# Patient Record
Sex: MALE | Race: WHITE | NOT HISPANIC OR LATINO | Employment: UNEMPLOYED | URBAN - METROPOLITAN AREA
[De-identification: names, ages, dates, MRNs, and addresses within clinical notes are randomized per-mention and may not be internally consistent; named-entity substitution may affect disease eponyms.]

---
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
## 2024-01-04 DIAGNOSIS — G71.01 DUCHENNE MUSCULAR DYSTROPHY (H): ICD-10-CM

## 2024-01-04 RX ORDER — PREDNISONE 20 MG/1
TABLET ORAL
Qty: 40 TABLET | Refills: 5 | Status: SHIPPED | OUTPATIENT
Start: 2024-01-04 | End: 2024-07-22

## 2024-01-04 NOTE — TELEPHONE ENCOUNTER
1. Refill request received from: [facility] in [place]   2. Medication Requested: Prednisone 20mg TAB   3. Directions:take 5 tablets PO BID   4. Quantity:40  5. Last Office Visit: 9/6/23                    Has it been over a year since the last appointment (6 months for diabetes)? no                    If No:     Move on to next question.                    If Yes:                      Change refill quantity to 1 month.                      Route to Provider or Pool & let them know its been over a year since patient has been seen.                      If they do not have an upcoming appointment- reach out to family to schedule or route to .  6. Next Appointment Scheduled for: 2/23/24  7. Last refill: 11/22/23  8. Sent To: [facility]- Refills provided per protocol.

## 2024-01-17 DIAGNOSIS — G71.01 CARDIOMYOPATHY IN DUCHENNE MUSCULAR DYSTROPHY (H): ICD-10-CM

## 2024-01-17 DIAGNOSIS — I43 CARDIOMYOPATHY IN DUCHENNE MUSCULAR DYSTROPHY (H): ICD-10-CM

## 2024-01-17 RX ORDER — ENALAPRIL MALEATE 10 MG/1
10 TABLET ORAL 2 TIMES DAILY
Qty: 60 TABLET | Refills: 11 | Status: SHIPPED | OUTPATIENT
Start: 2024-01-17

## 2024-01-17 NOTE — TELEPHONE ENCOUNTER
Refill request received from: [id number]   Medication Requested:  Enalapril 10 mg tablet  Directions:Take 1 tablet (10 mg) by mouth 2 times daily   Quantity:60  Last Office Visit: 9/8/23  Next Appointment Scheduled for: 3/8/24  Last refill: 10/06/23  Sent To:  RN or Provider- Refills provided per protocol.

## 2024-01-19 DIAGNOSIS — G71.01 CARDIOMYOPATHY IN DUCHENNE MUSCULAR DYSTROPHY (H): ICD-10-CM

## 2024-01-19 DIAGNOSIS — I43 CARDIOMYOPATHY IN DUCHENNE MUSCULAR DYSTROPHY (H): ICD-10-CM

## 2024-01-19 RX ORDER — CARVEDILOL 25 MG/1
25 TABLET ORAL 2 TIMES DAILY WITH MEALS
Qty: 60 TABLET | Refills: 11 | Status: SHIPPED | OUTPATIENT
Start: 2024-01-19

## 2024-01-19 NOTE — TELEPHONE ENCOUNTER
Refill request received from: [id number]  Medication Requested:  Carvedilol 25 mg tablet  Directions:Take 1 tablet by mouth twice a day with meals   Quantity:60  Last Office Visit: 9/8/24  Next Appointment Scheduled for: 3/8/24  Last refill: 1/10/24  Sent To:  RN or Provider    Refill provided per protocol.

## 2024-01-25 ENCOUNTER — TELEPHONE (OUTPATIENT)
Dept: PEDIATRIC NEUROLOGY | Facility: CLINIC | Age: 19
End: 2024-01-25
Payer: COMMERCIAL

## 2024-01-25 NOTE — TELEPHONE ENCOUNTER
Refills sent to pharmacy 1/4/24. Called [facility] to confirm prescription received. Called Christopher and his mother to provide them with the updated Rx number: [id number].    Gave adithyados to Christopher for participating in the phone call and taking an active role in refilling his medications.

## 2024-01-25 NOTE — TELEPHONE ENCOUNTER
[facility] Call Center    Phone Message    May a detailed message be left on voicemail: yes     Reason for Call: Medication Refill Request    Has the patient contacted the pharmacy for the refill? Yes   Name of medication being requested: predniSONE (DELTASONE) 20 MG tablet   Provider who prescribed the medication:   Pharmacy: [facility] [id number] - [address]   Date medication is needed: 01/29/24

## 2024-02-07 ENCOUNTER — TELEPHONE (OUTPATIENT)
Dept: PEDIATRIC CARDIOLOGY | Facility: CLINIC | Age: 19
End: 2024-02-07
Payer: COMMERCIAL

## 2024-02-07 DIAGNOSIS — G71.01 CARDIOMYOPATHY IN DUCHENNE MUSCULAR DYSTROPHY (H): ICD-10-CM

## 2024-02-07 DIAGNOSIS — I43 CARDIOMYOPATHY IN DUCHENNE MUSCULAR DYSTROPHY (H): ICD-10-CM

## 2024-02-07 RX ORDER — EPLERENONE 25 MG/1
25 TABLET, FILM COATED ORAL DAILY
Qty: 90 TABLET | Refills: 3 | Status: SHIPPED | OUTPATIENT
Start: 2024-02-07

## 2024-02-07 NOTE — TELEPHONE ENCOUNTER
[facility] Call Center    Phone Message    May a detailed message be left on voicemail: yes     Reason for Call: Medication Refill Request    Has the patient contacted the pharmacy for the refill? Yes  Name of medication being requested: eplerenone (INSPRA) 25 MG tablet   Provider who prescribed the medication: Peter Bright MD  Pharmacy: *change of pharmacy* [facility] at [address] [place] [address]. Store [id number]. Phone: [phone]  Date medication is needed: asap please (out of medication)    Action Taken: Message routed to:  Other: ump peds cardiology [facility]    Travel Screening: Not Applicable

## 2024-02-07 NOTE — TELEPHONE ENCOUNTER
Last visit 9/8/23. Next appointment 2/23/24. Refills to requested pharmacy provided per protocol.

## 2024-02-09 ENCOUNTER — TELEPHONE (OUTPATIENT)
Dept: PEDIATRIC CARDIOLOGY | Facility: CLINIC | Age: 19
End: 2024-02-09
Payer: COMMERCIAL

## 2024-02-09 NOTE — TELEPHONE ENCOUNTER
Prior Authorization Retail Medication Request    Medication/Dose: Eplerenone 25 mg tablets  Diagnosis and ICD code (if different than what is on RX):  G71.01  New/renewal/insurance change PA/secondary ins. PA:  Previously Tried and Failed:    Rationale:  renewal    Insurance   Primary: /[employer] [id number]   Insurance ID:  [id number]    Secondary (if applicable):NA  Insurance ID:  NA    Pharmacy Information (if different than what is on RX)  [facility] [place]  Phone:  [phone]  Fax:[phone]     [place]: [id number]

## 2024-02-15 ENCOUNTER — CARE COORDINATION (OUTPATIENT)
Dept: PEDIATRIC NEUROLOGY | Facility: CLINIC | Age: 19
End: 2024-02-15
Payer: COMMERCIAL

## 2024-02-15 DIAGNOSIS — G71.01 CARDIOMYOPATHY IN DUCHENNE MUSCULAR DYSTROPHY (H): Primary | ICD-10-CM

## 2024-02-15 DIAGNOSIS — I43 CARDIOMYOPATHY IN DUCHENNE MUSCULAR DYSTROPHY (H): Primary | ICD-10-CM

## 2024-02-15 NOTE — PROGRESS NOTES
Orders entered for MD clinic prep. Christopher will have labs, echo and EKG done 2/23/24 at [facility]. Christopher has virtual visit with Dr. Bright scheduled 3/8/24.

## 2024-02-23 ENCOUNTER — ONCOLOGY VISIT (OUTPATIENT)
Dept: PEDIATRIC HEMATOLOGY/ONCOLOGY | Facility: CLINIC | Age: 19
End: 2024-02-23
Attending: PEDIATRICS
Payer: COMMERCIAL

## 2024-02-23 ENCOUNTER — OFFICE VISIT (OUTPATIENT)
Dept: PEDIATRIC NEUROLOGY | Facility: CLINIC | Age: 19
End: 2024-02-23
Attending: PEDIATRICS
Payer: COMMERCIAL

## 2024-02-23 ENCOUNTER — ANCILLARY PROCEDURE (OUTPATIENT)
Dept: BONE DENSITY | Facility: CLINIC | Age: 19
End: 2024-02-23
Attending: PEDIATRICS
Payer: COMMERCIAL

## 2024-02-23 ENCOUNTER — HOSPITAL ENCOUNTER (OUTPATIENT)
Dept: CARDIOLOGY | Facility: CLINIC | Age: 19
Discharge: HOME | End: 2024-02-23
Attending: PEDIATRICS
Payer: COMMERCIAL

## 2024-02-23 ENCOUNTER — THERAPY VISIT (OUTPATIENT)
Dept: PHYSICAL THERAPY | Facility: CLINIC | Age: 19
End: 2024-02-23
Attending: PEDIATRICS
Payer: COMMERCIAL

## 2024-02-23 ENCOUNTER — OFFICE VISIT (OUTPATIENT)
Dept: PULMONOLOGY | Facility: CLINIC | Age: 19
End: 2024-02-23
Attending: PEDIATRICS
Payer: COMMERCIAL

## 2024-02-23 VITALS
RESPIRATION RATE: 18 BRPM | BODY MASS INDEX: 30 KG/M2 | DIASTOLIC BLOOD PRESSURE: 58 MMHG | WEIGHT: 191.14 LBS | SYSTOLIC BLOOD PRESSURE: 102 MMHG | HEART RATE: 75 BPM | OXYGEN SATURATION: 98 % | TEMPERATURE: 97.3 F | HEIGHT: 67 IN

## 2024-02-23 VITALS
RESPIRATION RATE: 18 BRPM | OXYGEN SATURATION: 98 % | WEIGHT: 191.14 LBS | HEART RATE: 75 BPM | TEMPERATURE: 97.3 F | DIASTOLIC BLOOD PRESSURE: 58 MMHG | HEIGHT: 67 IN | SYSTOLIC BLOOD PRESSURE: 102 MMHG | BODY MASS INDEX: 30 KG/M2

## 2024-02-23 VITALS
HEART RATE: 75 BPM | TEMPERATURE: 97.3 F | SYSTOLIC BLOOD PRESSURE: 102 MMHG | WEIGHT: 191.14 LBS | RESPIRATION RATE: 18 BRPM | HEIGHT: 67 IN | OXYGEN SATURATION: 98 % | DIASTOLIC BLOOD PRESSURE: 58 MMHG | BODY MASS INDEX: 30 KG/M2

## 2024-02-23 DIAGNOSIS — G71.01 CARDIOMYOPATHY IN DUCHENNE MUSCULAR DYSTROPHY (H): ICD-10-CM

## 2024-02-23 DIAGNOSIS — G71.01 DUCHENNE MUSCULAR DYSTROPHY (H): Primary | ICD-10-CM

## 2024-02-23 DIAGNOSIS — I43 CARDIOMYOPATHY IN DUCHENNE MUSCULAR DYSTROPHY (H): ICD-10-CM

## 2024-02-23 DIAGNOSIS — G47.33 OSA (OBSTRUCTIVE SLEEP APNEA): ICD-10-CM

## 2024-02-23 DIAGNOSIS — M85.89 OSTEOPENIA OF MULTIPLE SITES: ICD-10-CM

## 2024-02-23 DIAGNOSIS — Z71.83 ENCOUNTER FOR NONPROCREATIVE GENETIC COUNSELING: ICD-10-CM

## 2024-02-23 DIAGNOSIS — J98.4 RESTRICTIVE LUNG DISEASE DUE TO MUSCULAR DYSTROPHY (H): Primary | ICD-10-CM

## 2024-02-23 DIAGNOSIS — E30.0 DELAYED PUBERTY: ICD-10-CM

## 2024-02-23 DIAGNOSIS — G71.00 RESTRICTIVE LUNG DISEASE DUE TO MUSCULAR DYSTROPHY (H): Primary | ICD-10-CM

## 2024-02-23 DIAGNOSIS — G71.01 DUCHENNE MUSCULAR DYSTROPHY (H): Primary | Chronic | ICD-10-CM

## 2024-02-23 DIAGNOSIS — E55.9 VITAMIN D DEFICIENCY: ICD-10-CM

## 2024-02-23 DIAGNOSIS — Z79.52 CURRENT CHRONIC USE OF SYSTEMIC STEROIDS: ICD-10-CM

## 2024-02-23 DIAGNOSIS — E04.9 GOITER: ICD-10-CM

## 2024-02-23 DIAGNOSIS — M81.8 OTHER OSTEOPOROSIS WITHOUT CURRENT PATHOLOGICAL FRACTURE: ICD-10-CM

## 2024-02-23 LAB
ALBUMIN SERPL BCG-MCNC: 4.1 G/DL (ref 3.5–5.2)
ALP SERPL-CCNC: 81 U/L (ref 65–260)
ALT SERPL W P-5'-P-CCNC: 55 U/L (ref 0–50)
ANION GAP SERPL CALCULATED.3IONS-SCNC: 10 MMOL/L (ref 7–15)
AST SERPL W P-5'-P-CCNC: 69 U/L (ref 0–35)
BASOPHILS # BLD AUTO: 0.1 10E3/UL (ref 0–0.2)
BASOPHILS NFR BLD AUTO: 1 %
BILIRUB SERPL-MCNC: 0.8 MG/DL
BUN SERPL-MCNC: 10.1 MG/DL (ref 6–20)
CALCIUM SERPL-MCNC: 8.9 MG/DL (ref 8.6–10)
CHLORIDE SERPL-SCNC: 100 MMOL/L (ref 98–107)
CREAT SERPL-MCNC: 0.35 MG/DL (ref 0.67–1.17)
CYSTATIN C (ROCHE): 1 MG/L (ref 0.6–1)
DEPRECATED HCO3 PLAS-SCNC: 28 MMOL/L (ref 22–29)
EGFRCR SERPLBLD CKD-EPI 2021: >90 ML/MIN/1.73M2
EOSINOPHIL # BLD AUTO: 0.2 10E3/UL (ref 0–0.7)
EOSINOPHIL NFR BLD AUTO: 3 %
ERYTHROCYTE [DISTWIDTH] IN BLOOD BY AUTOMATED COUNT: 14.3 % (ref 10–15)
EXPTIME-PRE: 7.49 SEC
FEF2575-%PRED-PRE: 7 %
FEF2575-PRE: 0.34 L/SEC
FEF2575-PRED: 4.78 L/SEC
FEFMAX-%PRED-PRE: 14 %
FEFMAX-PRE: 1.42 L/SEC
FEFMAX-PRED: 9.81 L/SEC
FEV1-%PRED-PRE: 17 %
FEV1-PRE: 0.77 L
FEV1FEV6-PRE: 47 %
FEV1FEV6-PRED: 85 %
FEV1FVC-PRE: 47 %
FEV1FVC-PRED: 87 %
FIFMAX-PRE: 1.53 L/SEC
FSH SERPL IRP2-ACNC: 2.5 MIU/ML (ref 1.5–12.4)
FVC-%PRED-PRE: 32 %
FVC-PRE: 1.64 L
FVC-PRED: 5 L
GFR SERPL CREATININE-BSD FRML MDRD: >90 ML/MIN/1.73M2
GLUCOSE SERPL-MCNC: 78 MG/DL (ref 70–99)
HCT VFR BLD AUTO: 44.2 % (ref 40–53)
HGB BLD-MCNC: 14.2 G/DL (ref 13.3–17.7)
IMM GRANULOCYTES # BLD: 0.1 10E3/UL
IMM GRANULOCYTES NFR BLD: 1 %
LH SERPL-ACNC: 3.1 MIU/ML (ref 1.7–8.6)
LYMPHOCYTES # BLD AUTO: 2.9 10E3/UL (ref 0.8–5.3)
LYMPHOCYTES NFR BLD AUTO: 30 %
MCH RBC QN AUTO: 27.6 PG (ref 26.5–33)
MCHC RBC AUTO-ENTMCNC: 32.1 G/DL (ref 31.5–36.5)
MCV RBC AUTO: 86 FL (ref 78–100)
MEP-PRE: 38 CMH2O
MIP-PRE: -35 CMH2O
MONOCYTES # BLD AUTO: 0.7 10E3/UL (ref 0–1.3)
MONOCYTES NFR BLD AUTO: 7 %
NEUTROPHILS # BLD AUTO: 5.7 10E3/UL (ref 1.6–8.3)
NEUTROPHILS NFR BLD AUTO: 58 %
NRBC # BLD AUTO: 0 10E3/UL
NRBC BLD AUTO-RTO: 0 /100
NT-PROBNP SERPL-MCNC: 58 PG/ML (ref 0–450)
PHOSPHATE SERPL-MCNC: 4 MG/DL (ref 2.5–4.5)
PLATELET # BLD AUTO: 315 10E3/UL (ref 150–450)
POTASSIUM SERPL-SCNC: 4.1 MMOL/L (ref 3.4–5.3)
PROT SERPL-MCNC: 6.5 G/DL (ref 6.4–8.3)
PTH-INTACT SERPL-MCNC: 33 PG/ML (ref 15–65)
RBC # BLD AUTO: 5.14 10E6/UL (ref 4.4–5.9)
SODIUM SERPL-SCNC: 138 MMOL/L (ref 135–145)
T4 FREE SERPL-MCNC: 1.8 NG/DL (ref 1–1.6)
TSH SERPL DL<=0.005 MIU/L-ACNC: 1.28 UIU/ML (ref 0.5–4.3)
WBC # BLD AUTO: 9.5 10E3/UL (ref 4–11)

## 2024-02-23 PROCEDURE — 99215 OFFICE O/P EST HI 40 MIN: CPT | Performed by: PEDIATRICS

## 2024-02-23 PROCEDURE — 94375 RESPIRATORY FLOW VOLUME LOOP: CPT

## 2024-02-23 PROCEDURE — G0463 HOSPITAL OUTPT CLINIC VISIT: HCPCS | Mod: 25,27 | Performed by: PEDIATRICS

## 2024-02-23 PROCEDURE — 93306 TTE W/DOPPLER COMPLETE: CPT | Mod: 26 | Performed by: PEDIATRICS

## 2024-02-23 PROCEDURE — 36415 COLL VENOUS BLD VENIPUNCTURE: CPT | Performed by: GENETIC COUNSELOR, MS

## 2024-02-23 PROCEDURE — 83001 ASSAY OF GONADOTROPIN (FSH): CPT | Performed by: GENETIC COUNSELOR, MS

## 2024-02-23 PROCEDURE — 94799 UNLISTED PULMONARY SVC/PX: CPT | Mod: 26 | Performed by: PEDIATRICS

## 2024-02-23 PROCEDURE — 97161 PT EVAL LOW COMPLEX 20 MIN: CPT | Mod: GP | Performed by: PHYSICAL THERAPIST

## 2024-02-23 PROCEDURE — 77080 DXA BONE DENSITY AXIAL: CPT | Mod: 26 | Performed by: PEDIATRICS

## 2024-02-23 PROCEDURE — 93306 TTE W/DOPPLER COMPLETE: CPT

## 2024-02-23 PROCEDURE — G0463 HOSPITAL OUTPT CLINIC VISIT: HCPCS | Mod: 25,27 | Performed by: PSYCHIATRY & NEUROLOGY

## 2024-02-23 PROCEDURE — 82610 CYSTATIN C: CPT | Performed by: GENETIC COUNSELOR, MS

## 2024-02-23 PROCEDURE — 83880 ASSAY OF NATRIURETIC PEPTIDE: CPT | Performed by: GENETIC COUNSELOR, MS

## 2024-02-23 PROCEDURE — G0463 HOSPITAL OUTPT CLINIC VISIT: HCPCS | Mod: 25 | Performed by: PEDIATRICS

## 2024-02-23 PROCEDURE — 82306 VITAMIN D 25 HYDROXY: CPT | Performed by: GENETIC COUNSELOR, MS

## 2024-02-23 PROCEDURE — 99215 OFFICE O/P EST HI 40 MIN: CPT | Mod: GC | Performed by: PEDIATRICS

## 2024-02-23 PROCEDURE — 77080 DXA BONE DENSITY AXIAL: CPT

## 2024-02-23 PROCEDURE — 84403 ASSAY OF TOTAL TESTOSTERONE: CPT | Performed by: GENETIC COUNSELOR, MS

## 2024-02-23 PROCEDURE — 99213 OFFICE O/P EST LOW 20 MIN: CPT | Performed by: PSYCHIATRY & NEUROLOGY

## 2024-02-23 PROCEDURE — 93005 ELECTROCARDIOGRAM TRACING: CPT | Mod: RTG

## 2024-02-23 PROCEDURE — 80053 COMPREHEN METABOLIC PANEL: CPT | Performed by: GENETIC COUNSELOR, MS

## 2024-02-23 PROCEDURE — 94375 RESPIRATORY FLOW VOLUME LOOP: CPT | Mod: 26 | Performed by: PEDIATRICS

## 2024-02-23 PROCEDURE — 83970 ASSAY OF PARATHORMONE: CPT | Performed by: GENETIC COUNSELOR, MS

## 2024-02-23 PROCEDURE — 84100 ASSAY OF PHOSPHORUS: CPT | Performed by: GENETIC COUNSELOR, MS

## 2024-02-23 PROCEDURE — 84484 ASSAY OF TROPONIN QUANT: CPT | Performed by: GENETIC COUNSELOR, MS

## 2024-02-23 PROCEDURE — 83002 ASSAY OF GONADOTROPIN (LH): CPT | Performed by: GENETIC COUNSELOR, MS

## 2024-02-23 PROCEDURE — G2211 COMPLEX E/M VISIT ADD ON: HCPCS | Performed by: PSYCHIATRY & NEUROLOGY

## 2024-02-23 PROCEDURE — 84443 ASSAY THYROID STIM HORMONE: CPT | Performed by: GENETIC COUNSELOR, MS

## 2024-02-23 PROCEDURE — 84439 ASSAY OF FREE THYROXINE: CPT | Performed by: GENETIC COUNSELOR, MS

## 2024-02-23 PROCEDURE — 85004 AUTOMATED DIFF WBC COUNT: CPT | Performed by: GENETIC COUNSELOR, MS

## 2024-02-23 PROCEDURE — 96040 HC GENETIC COUNSELING, EACH 30 MINUTES: CPT | Performed by: GENETIC COUNSELOR, MS

## 2024-02-23 PROCEDURE — G2211 COMPLEX E/M VISIT ADD ON: HCPCS | Performed by: PEDIATRICS

## 2024-02-23 PROCEDURE — 94799 UNLISTED PULMONARY SVC/PX: CPT

## 2024-02-23 ASSESSMENT — PAIN SCALES - GENERAL
PAINLEVEL: NO PAIN (0)

## 2024-02-23 NOTE — PATIENT INSTRUCTIONS
We will decrease the AVAPS rate to 8 breaths per minute. Please let us know if you don't like the change and we can go back.   Continue to do breath stacking techniques 3 breaths three times per day.  Follow up in 6 months with full lung function testing.

## 2024-02-23 NOTE — NURSING NOTE
[id number]  Chief Complaint   Patient presents with    Follow Up     MD     Initial /58 (BP Location: Right arm, Patient Position: Chair, Cuff Size: Adult Large)   Pulse 75   Temp 97.3  F (36.3  C) (Oral)   Resp 18   Ht 5' 6.93\" (170 cm)   Wt 191 lb 2.2 oz (86.7 kg)   SpO2 98%   BMI 30.00 kg/m   Estimated body mass index is 30 kg/m  as calculated from the following:    Height as of this encounter: 5' 6.93\" (170 cm).    Weight as of this encounter: 191 lb 2.2 oz (86.7 kg).  Medication Reconciliation: complete    Does the patient need any medication refills today? No    Does the patient/parent need MyChart or Proxy acces today? Yes    Does the patient want a flu shot today? No        Stephan Roy MA           "

## 2024-02-23 NOTE — PATIENT INSTRUCTIONS
Pediatric Neuromuscular Specialty Clinic  [facility]    Contact Numbers:    For questions that are not urgent, contact:  Rachel Romero RN Care Coordinator:  [phone]  Norma Byrd RN Care Coordinator: [phone]     After hours, or for urgent questions,   contact: [phone]    Schedule or change an appointment:  Patrica Ma, [phone]    Genetic Counselor: Desiree Minaya, [phone]    Physical Therapy: Adeline Smith, [phone]     Dietician: Ana Flores, [phone]    Prescription renewals:  Your pharmacy must fax request to [phone]  **Please allow 2-3 days for prescriptions to be authorized.    Today's Visit:   Follow-up in 6-months  We will send orders for a new sling

## 2024-02-23 NOTE — LETTER
2/23/2024       RE: Christopher Gorman  [address]  [place] [address]     Dear Colleague,    Thank you for referring your patient, Christopher Gorman, to the [facility] PEDIATRIC SPECIALTY CLINIC at [facility]. Please see a copy of my visit note below.                  Pediatric Neuromuscular Clinic      Christopher Gorman MRN# [id number]   YOB: 2005 Age: 17 year old      Date of Visit:  Sep 6, 2023     Primary care provider: Jose Finn     Refering physician:[unfilled]      History is obtained from the patient, family and medical record       Interval Change:      Christopher Gorman is a 18year 4month old male was seen and examined at the pediatric neuromuscular clinic on Sep 6, 2023 for a follow up evaluation of previously diagnosed Duchenne muscular dystrophy.   He is nonambulatory and uses a power wheelchair full-time for mobility.  He had a new power wheelchair last year in May 2023 that has a standing feature, and they use this about 2-3 times per day to stretch his legs.  They find this future quite helpful.  He is dependent for transfers and uses a lift.  He uses a chair for showering.  Currently is taking prednisone, vitamin D, Vitamin C, calcium, and heart medications.  Denies any side effects.  Denies any new shortness of breath.  Does have baseline neuromuscular weakness with breathing overnight and uses BiPAP consistently.  Denies any GI concerns, otherwise sleeping well.  He graduated from high school last year and has been at home with his parents.              Immunizations:           Immunization History   Administered Date(s) Administered     Influenza Vaccine >6 months (Alfuria,Fluzone) 12/12/2014     Pneumococcal 23 valent 12/12/2014              Allergies:    No Known Allergies          Medications:      Prescription Medications as of 9/6/2023           Rx Number Disp Refills Start End Last Dispensed Date Next  Fill Date Owning Pharmacy     acetaminophen (TYLENOL) 325 MG tablet   1 Bottle 0 2019       [facility] [address]     Sig: Take 2 tablets (650 mg) by mouth every 6 hours as needed for mild pain or fever     Class: No Print Out     Route: Oral     Ascorbic Acid (VITAMIN C PO)                     Sig: Take 3 tablets by mouth daily     Class: Historical     Route: Oral     CALCIUM ANTACID 500 MG chewable tablet     0 7/3/2019       [facility]     Si chew tab 2 times daily     Class: Historical     calcium carbonate 500 mg, elemental, (OSCAL) 500 MG tablet   120 tablet 5 2023       [facility]     Sig: Take 2 tablets (1,000 mg) by mouth 2 times daily     Class: E-Prescribe     Route: Oral     carvedilol (COREG) 25 MG tablet   60 tablet 11 2023       [facility]     Sig: Take 1 tablet (25 mg) by mouth 2 times daily (with meals)     Class: E-Prescribe     Route: Oral     Cholecalciferol (VITAMIN D3) 50 MCG (2000 UT) CAPS   60 capsule 5 2020       [facility]     Sig: Take 2 capsules by mouth daily     Class: E-Prescribe     Route: Oral     enalapril (VASOTEC) 10 MG tablet   60 tablet 11 2023       [facility]     Sig: Take 1 tablet (10 mg) by mouth 2 times daily     Class: E-Prescribe     Route: Oral     eplerenone (INSPRA) 25 MG tablet   90 tablet 3 2023             Sig: Take 1 tablet (25 mg) by mouth daily     Class: Local Print     Route: Oral     FLUoxetine (PROZAC) 10 MG capsule               [facility]     Sig: Take 10 mg by mouth daily     Class: Historical     Route: Oral     omeprazole (PRILOSEC) 10 MG DR capsule   16 capsule 4 2023       [facility]     Sig:  "OPEN 1 CAPSULE AND SPRINKLE CONTENTS ON SPOONFUL OF FOOD ONCE DAILY ON FRIDAY, SATURDAY,SUNDAY AND MONDAY     Class: E-Prescribe     omeprazole (PRILOSEC) 10 MG DR capsule   16 capsule 3 12/7/2020       [facility], [address]     Sig: OPEN 1 CAPSULE AND SPRINKLE CONTENTS ON A SPOONFUL OF FOOD, ONCE DAILY, FRIDAY, SATURDAY, SUNDAY AND MONDAY.     Class: E-Prescribe     order for DME   1 Units 0 8/11/2017       [facility] [address]     Sig: Sling for Micah Lift.     Class: Local Print     predniSONE (DELTASONE) 20 MG tablet   40 tablet 5 7/12/2023       [facility] [address]     Sig: TAKE 5 TABLETS BY MOUTH TWICE WEEKLY     Class: E-Prescribe     Notes to Pharmacy: We are filling last refill today and the patient is requesting authorization to refill once that supply has been used. Thank you!     testosterone cypionate (DEPOTESTOSTERONE) 200 MG/ML injection   1 mL 5 3/18/2021       [facility] [address]     Sig: Inject 1 mL (200 mg) into the muscle every 28 days     Class: E-Prescribe     Notes to Pharmacy: Please provide 18 g needle for withdrawing medication and 23 gauge needle for administration. Please provide 3 mL syringe.     Route: Intramuscular                     Review of Systems:   The Review of Systems is negative other than noted in the HPI          Physical Exam:      /58 (BP Location: Right arm, Patient Position: Sitting, Cuff Size: Adult Large)   Pulse 75   Temp 97.3  F (36.3  C) (Oral)   Resp 18   Ht 1.7 m (5' 6.93\")   Wt 86.7 kg (191 lb 2.2 oz)   SpO2 98%   BMI 30.00 kg/m      Physical Exam:   General: NAD  Eyes: No conjunctival injection, no scleral icterus.  Mouth: No oral lesions, no erythema or exudate in the oropharynx  Respiratory: No increased work of breathing  Cardiovascular: No lower extremity edema  Extremities: Warm, dry  Neurologic:             Mental " Status Exam: Alert, awake and easily engaged in interaction.             Cranial Nerves: PERRLA, EOMs intact, no nystagmus, facial movements symmetric,                 facial sensation intact to light touch, hearing intact to conversation, palate and uvula               rise symmetrically, no deviation in uvula or tongue, tongue midline and fully mobile                with no atrophy or fasciculations.              Motor:    Manual muscle testing: Mostly only antigravity with his proximal upper and lower extremities, and distal extremities are 2-3.  He is able to hold his neck up unsupported most of the time.             Sensory: Normal response to touch.              Coordination: No overt dysmetria seen.              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 Stable cardiomyopathy with normal cardiac function. Asymptomatic restrictive lung disease.     Given that patient has been stable over the past 1 year, we will continue with the current treatment plan of prednisone 100 mg twice weekly with vitamin D and calcium.     Recommendations:   - Continue prednisone 100 mg twice weekly  - Continue vitamin D and calcium  - Continue to follow up with cardiology  - Continue to follow up with ortho  - Return to clinic in 6 months or sooner if necessary      Charles Chacon MD  Neuromuscular Medicine Fellow, PGY-5  [facility]       The longitudinal plan of care for the diagnosis as documented were addressed during this visit. Due to the added complexity in care, I will continue to support Christopher in the subsequent management and with ongoing continuity of care.  I personally examined this patient with the fellow Dr Chacon.  I have spent at  least 20 min on the date of the encounter in chart review, patient visit, review of tests, counseling the patient, documentation about the issues documented above. Our recommendations were discussed with the other providers and patient and/or family.         Tray Cavazos MD  Neurology and neuromuscular medicine                Again, thank you for allowing me to participate in the care of your patient.      Sincerely,    Tray Cavazos MD

## 2024-02-23 NOTE — LETTER
2/23/2024       RE: Christopher Gorman  [address]  [place] [address]     Dear Colleague,    Thank you for referring your patient, Christopher Gorman, to the [facility] at [facility]. Please see a copy of my visit note below.    Pediatric Endocrinology Follow-up Consultation    Patient: Christopher Gorman MRN# [id number]   YOB: 2005 Age: 18year 4month old   Date of Visit: Feb 23, 2024    Dear Dr. Jose Lucasuble:    I had the pleasure of seeing your patient, Christopher Gorman in the Pediatric Endocrinology Clinic, [facility], on Feb 23, 2024 for a follow-up consultation of vitamin D deficiency and osteoporosis secondary to chronic glucocortoid therapy.         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 He was previously seen by my colleague, Dr. Delatorre, and was last seen in 2018.     To review, he was diagnosed with DMD in April 2014. Steroid treatment (prednisone) was started in  2014. Christopher was found to have a vertebral compression fracture and received zoledronate therapy in July 2019. He had severe hypocalcemia following the infusion requiring hospitalization.      Christopher was found to have a vertebral compression fracture and received zoledronate therapy in July 2019. He had severe hypocalcemia following the infusion requiring hospitalization.    INTERIM HISTORY: Since last visit on 11/17/2022, Christopher's health has been stable with no significant new concerns.     No recent falls. No recent back, muscle or joint pain.    Christopher continues to take 5 20 mg tablets of prednisone twice weekly.    Christopher takes 500 mg Oscal twice daily and 1000 mg Tums twice daily. He is also taking vitamin D 5000 daily.     He had his most recent zoledronate infusion on 9/9/2021.  Since the last zoledronate infusion Christopher has not had any new falls or back pain.     History was obtained from Christopher and his parents, his brother was also present.         Social History:   He graduated from high school.    Social history was reviewed and is unchanged. Refer to the initial note.         Family History:     Family History   Problem Relation Age of Onset    Thyroid Disease Mother     Diabetes Maternal Grandmother     Thyroid Disease Maternal Grandmother     Muscular Dystrophy Brother      Dad has recent kidney stones.    Family history was reviewed and is unchanged. Refer to the initial note.         Allergies:   No Known Allergies          Medications:     Current Outpatient Medications   Medication Sig Dispense Refill    acetaminophen (TYLENOL) 325 MG tablet Take 2 tablets (650 mg) by mouth every 6 hours as needed for mild pain or fever 1 Bottle 0    Ascorbic Acid (VITAMIN C PO) Take 3 tablets by mouth daily      CALCIUM ANTACID 500 MG chewable tablet 1 chew tab 2 times daily  0    calcium carbonate 500 mg, elemental, (OSCAL) 500 MG tablet Take 2 tablets (1,000 mg) by mouth 2 times daily 120 tablet 5    carvedilol (COREG) 25 MG  "tablet Take 1 tablet (25 mg) by mouth 2 times daily (with meals) 60 tablet 11    Cholecalciferol (VITAMIN D3) 50 MCG (2000 UT) CAPS Take 2 capsules by mouth daily 60 capsule 5    enalapril (VASOTEC) 10 MG tablet Take 1 tablet (10 mg) by mouth 2 times daily 60 tablet 11    eplerenone (INSPRA) 25 MG tablet Take 1 tablet (25 mg) by mouth daily 90 tablet 3    FLUoxetine (PROZAC) 10 MG capsule Take 10 mg by mouth daily      order for DME Sling for Micah Lift. 1 Units 0    predniSONE (DELTASONE) 20 MG tablet TAKE 5 TABLETS BY MOUTH TWICE WEEKLY 40 tablet 5             Review of Systems:   Gen: Negative  Eye: Negative, no vision concerns. Due for another eye appointment with good vision.   ENT: Negative, no hearing concerns. He had braces removed in July 2021. He has had issues with wisdom teeth. They are investigating options for surgery due to challenges related to mobility and anesthesia.  Pulmonary: He uses a BIPAP machine some of the time at night (6 hours).  Cardio: Negative, no dizziness or fainting.   Gastrointestinal: Some constipation and is on Colace.  Hematologic: Negative, no bruising or bleeding concerns.  Genitourinary: Negative, no bladder concerns.  Musculoskeletal: He uses a electronic chair at all times except for when he uses a lift for the bathroom and transfers.   Psychiatric: Negative  Neurologic: Negative, no headaches. No seizures.   Skin: Dry skin due to eczema. A little acne on nose and chin.  Endocrine: see HPI. He does not shave his face yet.             Physical Exam:   Blood pressure 102/58, pulse 75, temperature 97.3  F (36.3  C), temperature source Temporal, resp. rate 18, height 1.7 m (5' 6.93\"), weight 86.7 kg (191 lb 2.2 oz), SpO2 98%.  Blood pressure %natalie are not available for patients who are 18 years or older.  Height: 170 cm   17 %ile (Z= -0.94) based on CDC (Boys, 2-20 Years) Stature-for-age data based on Stature recorded on 2/23/2024.  Weight: 86.7 kg (actual weight), 89 %ile (Z= " 1.22) based on [facility] (Boys, 2-20 Years) weight-for-age data using vitals from 2/23/2024. 165 lbs at the time of surgery for appendicitis.  BMI: Body mass index is 30 kg/m . 95 %ile (Z= 1.64) based on [facility] (Boys, 2-20 Years) BMI-for-age based on BMI available as of 2/23/2024.     GENERAL:  He is alert and in no apparent distress. Mildly Cushingoid face.   HEENT:  Head is  normocephalic and atraumatic.  Pupils equal, round and reactive to light and accommodation.  Extraocular movements are intact.  Funduscopic exam shows crisp disc margins and normal venous pulsations.  Nares are clear.  Oropharynx shows normal dentition uvula and palate.  Tympanic membranes visualized and clear.   NECK:  Supple.  Thyroid was palpable, smooth with no nodules. It was not enlarged.    LUNGS:  Clear to auscultation bilaterally.   CARDIOVASCULAR:  Regular rate and rhythm without murmur, gallop or rub.   BREASTS:  Teo I.  Axillary hair, odor and sweat were absent.   ABDOMEN:  Nondistended.  Positive bowel sounds, soft and nontender.  No hepatosplenomegaly or masses palpable.   GENITOURINARY EXAM: Deferred, Exam was Teo V at previous visit.   MUSCULOSKELETAL: He is wheelchair bound. No evidence of scoliosis. No pain to palpation and percussion of the spine lumbar spine.   NEUROLOGIC:  Cranial nerves II-XII tested and intact.   SKIN:  No evidence of acne or oiliness. No striae.  [place] colored macules on the back.         Laboratory results:   DX HIP/PELVIS/SPINE. 6/27/2019 1:20 PM     INDICATION: Hereditary progressive muscular dystrophy (H)     COMPARISON: 3/23/18     TECHNICAL: The patient was scanned using a GE Lunar Prodigy, with  pediatric software.     Age: 13 years 8 months  Gender: Male  Race/Ethnicity: White  Referring Physician: LOTUS DURAN     FINDINGS:     Image quality: adequate  Height: 61.0 inches   Weight: 152.9 lbs.  Height percentile for age: 5  Height age included if height less than 3rd percentile    "  Densitometry results:  Spine L2-L4, L1 excluded due to >1 SD compared to other lumbar  vertebrae  Chronological age BMD Z-score: -2.3  Bone Mineral Density: 0.735 gm/cm2  Percent change: 1.2%     Total Body Less Head:  Chronological age BMD Z-score: -3.0  Bone Mineral Density: 0.677 gm/cm2  Percent change: 2.6%     Body Composition:  Lean body mass for height centile: 1%  % body fat: 59.7%                                                                      IMPRESSION:   1. Bone mineral density below the expected range for age with no  significant change since DXA on 3/23/18.  2. L1 excluded due to >1 SD compared to other lumbar vertebrae, may  represent vertebral compression fracture based upon wedge deformity  seen on lateral spine radiograph on 6/27/19.  3. Elevated percent body fat.  4. Consider repeating DXA no sooner than 12 months unless clinically  indicated.     According to the ISCD October 2007 Position Statements at www.iscd.org   \"the diagnosis of osteoporosis in males and females ages 5 - 19  requires the presence of both a clinically significant fracture  history (one long bone fracture of the lower extremities, vertebral  compression fracture, or 2+ long bone fractures of the upper  extremities) and low bone mineral density. Low bone mineral density is  defined as BMD Z-score less than or equal to - 2.0 adjusted for age,  gender and body size as appropriate.\"  The least significant change (LSC) for AP Spine = 2%  *HAZ BMD Z-score is an adjustment of the BMD Z-score for short stature  (height <3%).  Body Composition: Cutoffs for Body Fatness from Shmuel et al. Arch  Ped Adol Med 2009;163(9):805.     Age, y      Normal       Moderate       Elevated     Boys  <9           <22%           22-26%           >26%  9-11.9     <24%           24-34%           >34%  12-14.9   <23%           23-32%           >32%  >=15       <22%           22-29%           >29%     Girls  <9           <27%           27-34%    "        >34%  9-11.9     <30%           30-37%           >37%  12-14.9   <32%           32-39%           >39%  >=15       <36%           36-42%           >42%     ANGELITO RAMOS MD    Component      Latest Ref Rng & Units 6/28/2019   Sodium      133 - 143 mmol/L 140   Potassium      3.4 - 5.3 mmol/L 3.8   Chloride      98 - 110 mmol/L 107   Carbon Dioxide      20 - 32 mmol/L 25   Anion Gap      3 - 14 mmol/L 8   Glucose      70 - 99 mg/dL 79   Urea Nitrogen      7 - 21 mg/dL 8   Creatinine      0.39 - 0.73 mg/dL 0.28 (L)   GFR Estimate      >60 mL/min/1.73:m2 GFR not calculated, patient <18 years old.   GFR Estimate If Black      >60 mL/min/1.73:m2 GFR not calculated, patient <18 years old.   Calcium      9.1 - 10.3 mg/dL 8.4 (L)   Bilirubin Total      0.2 - 1.3 mg/dL 1.2   Albumin      3.4 - 5.0 g/dL 3.5   Protein Total      6.8 - 8.8 g/dL 6.5 (L)   Alkaline Phosphatase      130 - 530 U/L 131   ALT      0 - 50 U/L 120 (H)   AST      0 - 35 U/L 104 (H)   Cholesterol      <170 mg/dL 177 (H)   Triglycerides      <90 mg/dL 142 (H)   HDL Cholesterol      >45 mg/dL 43 (L)   LDL Cholesterol Calculated      <110 mg/dL 106   Non HDL Cholesterol      <120 mg/dL 134 (H)   25 OH Vit D2      ug/L <5   25 OH Vit D3      ug/L 29   25 OH Vit D total      20 - 75 ug/L <34   IGF Binding Protein3      3.3 - 10.3 ug/mL 5.4   IGF Binding Protein 3 SD Score       NEG 0.8   FSH      0.5 - 10.7 IU/L 0.8   Lutropin      0.5 - 7.9 IU/L 0.3 (L)   Testosterone Total      0 - 1,200 ng/dL 8   Hemoglobin A1C      0 - 5.6 % 4.9   Thyroglobulin Antibody      <40 IU/mL <20   Thyroid Peroxidase Antibody      <35 IU/mL <10   TSH      0.40 - 4.00 mU/L 1.00   T4 Free      0.76 - 1.46 ng/dL 1.28   Lab Scanned Result       IGF-1 PEDIATRIC-Scanned (A)   Bone Spec Alk Phosphatase      27.8 - 210.9 ug/L 25.8 (L)   Parathyroid Hormone Intact      18 - 80 pg/mL 31   Phosphorus      2.9 - 5.4 mg/dL 4.3   Magnesium      1.6 - 2.3 mg/dL 2.3     6/28/19  IGF-1 to  [facility]: 171 ng/dL (187-599)  IGF-1 Z-Score: -2 SDS     Results for orders placed or performed in visit on 02/23/24   CBC with platelets and differential     Status: None   Result Value Ref Range    WBC Count 9.5 4.0 - 11.0 10e3/uL    RBC Count 5.14 4.40 - 5.90 10e6/uL    Hemoglobin 14.2 13.3 - 17.7 g/dL    Hematocrit 44.2 40.0 - 53.0 %    MCV 86 78 - 100 fL    MCH 27.6 26.5 - 33.0 pg    MCHC 32.1 31.5 - 36.5 g/dL    RDW 14.3 10.0 - 15.0 %    Platelet Count 315 150 - 450 10e3/uL    % Neutrophils 58 %    % Lymphocytes 30 %    % Monocytes 7 %    % Eosinophils 3 %    % Basophils 1 %    % Immature Granulocytes 1 %    NRBCs per 100 WBC 0 <1 /100    Absolute Neutrophils 5.7 1.6 - 8.3 10e3/uL    Absolute Lymphocytes 2.9 0.8 - 5.3 10e3/uL    Absolute Monocytes 0.7 0.0 - 1.3 10e3/uL    Absolute Eosinophils 0.2 0.0 - 0.7 10e3/uL    Absolute Basophils 0.1 0.0 - 0.2 10e3/uL    Absolute Immature Granulocytes 0.1 <=0.4 10e3/uL    Absolute NRBCs 0.0 10e3/uL   CBC with platelets differential     Status: None    Narrative    The following orders were created for panel order CBC with platelets differential.  Procedure                               Abnormality         Status                     ---------                               -----------         ------                     CBC with platelets and d...[[id number]]                      Final result                 Please view results for these tests on the individual orders.   Results for orders placed or performed during the hospital encounter of 02/23/24   Echo Pediatric (TTE) Complete     Status: None    [facility]    [id number]  [facility]  [id number]  [id number]^[id number]                                                               Study ID: [id number]                                                 [address]                                                 [place] [address]                                                Phone: [phone]                                Pediatric Echocardiogram  ______________________________________________________________________________  Name: PEE RUTLEDGE  Study Date: 2024 02:32 PM                      Patient Location: [facility]  MRN: [id number]                                      Age: 18 yrs  : 2005                                      BP: 102/58 mmHg  Gender: Male                                         HR: 63  Patient Class: Outpatient                            Height: 170 cm  Ordering Provider: MIKAL BOLDEN            Weight: 87 kg  Referring Provider: ANGELITO RAMOS            BSA: 2.0 m2  Performed By: Kwame Drew  Report approved by: Shimon Humphreys MD  Reason For Study: 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 Normal right and  left ventricular size and systolic function. The calculated biplane left  ventricular ejection fraction is 55 %. No pericardial effusion.  ______________________________________________________________________________  Technical information:  A complete two dimensional, MMODE, spectral and color Doppler transthoracic  echocardiogram is performed. Technically difficult study due to poor acoustic  windows. The subcostal views were difficult to obtain and are suboptimal in  quality. The apical views were difficult to obtain and are suboptimal in  quality. Prior echocardiogram available for comparison. ECG tracing shows  regular rhythm.     Segmental Anatomy:  There is normal atrial arrangement, with concordant atrioventricular and  ventriculoarterial connections.     Systemic and pulmonary veins:  There is a right-sided superior vena cava  draining normally to the right  atrium. The pulmonary venous return is not evaluated.     Atria and atrial septum:  Normal right atrial size. The left atrium is normal in size. The atrial septum  is not well visualized.     Atrioventricular valves:  The tricuspid valve is normal in appearance and motion. Trivial tricuspid  valve insufficiency. Insufficient jet to estimate right ventricular systolic  pressure. The mitral valve is normal in appearance and motion. There is no  mitral valve insufficiency.     Ventricles and Ventricular Septum:  Normal right ventricular size. Normal right ventricular systolic function.  Normal left ventricular size. Normal left ventricular systolic function. The  calculated biplane left ventricular ejection fraction is 55 %.     Outflow tracts:  Normal great artery relationship. There is unobstructed flow through the right  ventricular outflow tract. The pulmonary valve motion is normal. There is  normal flow across the pulmonary valve. Trivial pulmonary valve insufficiency.  There is unobstructed flow through the left ventricular outflow tract. There  is normal flow across the aortic valve.     Great arteries:  The main pulmonary artery has normal appearance. There is unobstructed flow in  the main pulmonary artery. The branch pulmonary arteries are not seen with  this study. Normal ascending aorta. There is normal antegrade flow in the  descending thoracic aorta. There is normal pulsatile flow in the descending  abdominal aorta. The aortic arch appears normal.     Arterial Shunts:  The ductal region is not imaged with this study.     Coronaries:  The origins of the coronary arteries are not well visualized.     Effusions, catheters, cannulas and leads:  No pericardial effusion.     MMode/2D Measurements & Calculations  LA dimension: 2.3 cm                       Ao root diam: 2.9 cm  LA/Ao: 0.79                                2 Chamber EF: 56.5 %  4 Chamber EF: 55.1 %                        EF Biplane: 55.2 %  LVMI(BSA): 52.8 grams/m2                   LVMI(Height): 25.8     RWT(MM): 0.31     Doppler Measurements & Calculations  MV E max sofia: 65.0 cm/sec                PA V2 max: 114.0 cm/sec  MV A max sofia: 36.7 cm/sec                PA max P.2 mmHg  MV E/A: 1.8     desc Ao max sofia: 160.0 cm/sec  desc Ao max PG: 10.2 mmHg     [place] 2D Z-SCORE VALUES  Measurement NameValue Z-ScorePredictedNormal Range  asc Aorta(2D)   2.1 cm-1.8   2.7      2.1 - 3.3  LVLd apical(4ch)7.5 cm-1.2   8.4      6.9 - 9.8  LVLs apical(4ch)6.2 cm-1.2   7.0      5.7 - 8.2     [place] Z-Scores (Measurements & Calculations)  Measurement NameValue      Z-ScorePredictedNormal Range  IVSd(MM)        0.78 cm    -1.6   1.0      0.72 - 1.35  LVIDd(MM)       4.6 cm     -1.7   5.3      4.5 - 6.1  LVIDs(MM)       3.2 cm     -0.68  3.4      2.7 - 4.2  LVPWd(MM)       0.71 cm    -1.9   0.97     0.71 - 1.23  LV mass(C)d(MM) 108.2 grams-3.1   200.7    135.3 - 297.6  FS(MM)          31.5 %     -1.0   34.9     28.7 - 42.6     Report approved by: Aimee Painter 2024 03:15 PM         Results for orders placed or performed in visit on 24   General PFT Lab (Please always keep checked)     Status: None (Preliminary result)   Result Value Ref Range    FVC-Pred 5.00 L    FVC-Pre 1.64 L    FVC-%Pred-Pre 32 %    FEV1-Pre 0.77 L    FEV1-%Pred-Pre 17 %    FEV1FVC-Pred 87 %    FEV1FVC-Pre 47 %    FEFMax-Pred 9.81 L/sec    FEFMax-Pre 1.42 L/sec    FEFMax-%Pred-Pre 14 %    FEF2575-Pred 4.78 L/sec    FEF2575-Pre 0.34 L/sec    JEP2232-%Pred-Pre 7 %    ExpTime-Pre 7.49 sec    FIFMax-Pre 1.53 L/sec    MEP-Pre 38 cmH2O    MIP-Pre -35 cmH2O    FEV1FEV6-Pred 85 %    FEV1FEV6-Pre 47 %   Results for orders placed or performed in visit on 24   Dexa hip/pelvis/spine     Status: None    Narrative    DX HIP/PELVIS/SPINE. 2024 12:16 PM    INDICATION: Osteopenia of multiple sites; Other osteoporosis without  current pathological  "fracture    COMPARISON: 10/5/2020    TECHNICAL: The patient was scanned using a GE Lunar Prodigy, with  pediatric software.    Age: 18 years 4 months  Gender: Male  Race/Ethnicity: White  Referring Physician: ANGELITO RAMOS    FINDINGS:    Image quality: adequate  Height: 67.0 inches   Weight: 191.0 lbs.  Height percentile for age: 18  Height age included if height less than 3rd percentile    Densitometry results:  Spine L1-L4  Chronological age BMD Z-score: -2.0  Bone Mineral Density: 0.961 gm/cm2  Percent change: 18.1%, significant increase    Total Body Less Head:  Chronological age BMD Z-score: -3.3  Bone Mineral Density: 0.763 gm/cm2  Percent change: 10.4%, significant increase    Hips:   Mean femoral neck BMD: 0.513 gm/cm2    Body Composition:  % body fat: 64.0%      Impression    IMPRESSION:   1. Bone mineral density below the expected range for age with  significant increase since DXA on 10/5/2020.  2. Elevated percent body fat.  3. Consider repeating DXA no sooner than 12 months unless clinically  indicated.    According to the ISCD October 2007 Position Statements at www.iscd.org   \"the diagnosis of osteoporosis in males and females ages 5 - 19  requires the presence of both a clinically significant fracture  history (one long bone fracture of the lower extremities, vertebral  compression fracture, or 2+ long bone fractures of the upper  extremities) and low bone mineral density. Low bone mineral density is  defined as BMD Z-score less than or equal to - 2.0 adjusted for age,  gender and body size as appropriate.\"  The least significant change (LSC) for AP Spine = 2%  *HAZ BMD Z-score is an adjustment of the BMD Z-score for short stature  (height <3%).  Body Composition: Cutoffs for Body Fatness from Shmuel et al. Arch  Ped Adol Med 2009;163(9):805.    Age, y      Normal       Moderate       Elevated    Boys  <9           <22%           22-26%           >26%  9-11.9     <24%           24-34%        "    >34%  12-14.9   <23%           23-32%           >32%  >=15       <22%           22-29%           >29%    Girls  <9           <27%           27-34%           >34%  9-11.9     <30%           30-37%           >37%  12-14.9   <32%           32-39%           >39%  >=15       <36%           36-42%           >42%    ANGELITO RAMOS MD         SYSTEM ID:  [id number]            Assessment and Plan:   1. Osteoporosis with vertebral compression fracture  2. Chronic steroid therapy  3. Duchenne muscular dystrophy   4. Hypocalcemia following zoledronate therapy  5. Delayed Puberty    Christopher has a history of vertebral compression fractures due to chronic steroid therapy, weakness, and decreased mobility related to Duchenne muscular dystrophy. Christopher had a hypocalcemic episode following bisphosphonate therapy requiring hospitalization. His most recent infusion occurred on 9/9/2021.  He did have some difficulty with hypocalcemia following the first infusion.  Due to his reaction, and no new fractures, we have not repeated the zoledronate infusion.  The repeat DXA scan today shows continued low bone mineral density with some improvement over time. We will plan to repeat the DXA at our next visit to help determine if any further zoledronate infusions would be warranted.     Boys with Duchenne muscular dystrophy have late pubertal development and may benefit from testosterone therapy with improvement in bone mineral density. Christopher completed testosterone therapy May 2020.  The testosterone therapy may also improve the bone mineral density over time.  Since puberty has not been significantly progressing, I recommended a second course of testosterone therapy.  We will reassess his ability to progress through puberty by repeating testosterone levels today.    MD Instructions:  I recommend continuing the current dose of calcium. I recommend continuing the current dose of vitamin D.  Please get DXA before our next visit (the day of or the day  before).     Tests to be obtained prior to next visit:  Orders Placed This Encounter   Procedures    Dexa Body Composition    Dexa hip/pelvis/spine    Comprehensive metabolic panel    Phosphorus    TSH    T4 free    Parathyroid Hormone Intact    Vitamin D2 + D3, 25 Hydroxy    FSH    Luteinizing Hormone    Testosterone total    CBC with platelets differential     RTC for follow up evaluation in 9-12 months.     Thank you for allowing me to participate in the care of your patient.  Please do not hesitate to call with questions or concerns.    Sincerely,    I personally performed the entire clinical encounter documented in this note.    David Lew MD, PhD  Professor  Pediatric Endocrinology  [facility]  Phone: [phone]  Fax:   [phone]     Face-to-face time 20 minutes, total visit time 40 minutes on date of visit including review of records and documentation.     The longitudinal plan of care for the diagnosis(es)/condition(s) as documented were addressed during this visit. Due to the added complexity in care, I will continue to support Christopher in the subsequent management and with ongoing continuity of care.   CC  Patient Care Team:  Jose Finn as PCP - General (Family Medicine)  Tray Cavazos MD as MD (Pediatric Neurology)  Selene Campos MD as MD (Pediatric Cardiology)  Jose Finn (Family Practice)  Melanie Delatorre MD as MD (INTERNAL MEDICINE - ENDOCRINOLOGY, DIABETES & METABOLISM)  Courtney Martinez MD as MD (Pediatric Pulmonology)  Tray Cavazos MD as Assigned Neuroscience Provider  Rachel Romero RN as Registered Nurse  Peter Bright MD as Assigned Pediatric Specialist Provider     Parents Beau Gorman  [address] [place] [address]  [facility] [id number]

## 2024-02-23 NOTE — PROGRESS NOTES
Pediatric Endocrinology Follow-up Consultation    Patient: Christopher Gorman MRN# [id number]   YOB: 2005 Age: 18year 4month old   Date of Visit: Feb 23, 2024    Dear Dr. Jose Finn:    I had the pleasure of seeing your patient, Christopher Gorman in the Pediatric Endocrinology Clinic, [facility], on Feb 23, 2024 for a follow-up consultation of vitamin D deficiency and osteoporosis secondary to chronic glucocortoid therapy.         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 He was previously seen by my colleague, Dr. Delatorre, and was last seen in 2018.     To review, he was diagnosed with DMD in April 2014. Steroid treatment (prednisone) was started in 2014. Christopher was found to have a vertebral compression fracture and received zoledronate therapy in July 2019. He had severe hypocalcemia following the infusion requiring hospitalization.      Christopher was found to have a vertebral compression fracture and received zoledronate therapy in July 2019. He had severe hypocalcemia  following the infusion requiring hospitalization.    INTERIM HISTORY: Since last visit on 11/17/2022, Christopher's health has been stable with no significant new concerns.     No recent falls. No recent back, muscle or joint pain.    Christopher continues to take 5 20 mg tablets of prednisone twice weekly.    Christopher takes 500 mg Oscal twice daily and 1000 mg Tums twice daily. He is also taking vitamin D 5000 daily.     He had his most recent zoledronate infusion on 9/9/2021.  Since the last zoledronate infusion Christopher has not had any new falls or back pain.     History was obtained from Christopher and his parents, his brother was also present.         Social History:   He graduated from high school.    Social history was reviewed and is unchanged. Refer to the initial note.         Family History:     Family History   Problem Relation Age of Onset    Thyroid Disease Mother     Diabetes Maternal Grandmother     Thyroid Disease Maternal Grandmother     Muscular Dystrophy Brother      Dad has recent kidney stones.    Family history was reviewed and is unchanged. Refer to the initial note.         Allergies:   No Known Allergies          Medications:     Current Outpatient Medications   Medication Sig Dispense Refill    acetaminophen (TYLENOL) 325 MG tablet Take 2 tablets (650 mg) by mouth every 6 hours as needed for mild pain or fever 1 Bottle 0    Ascorbic Acid (VITAMIN C PO) Take 3 tablets by mouth daily      CALCIUM ANTACID 500 MG chewable tablet 1 chew tab 2 times daily  0    calcium carbonate 500 mg, elemental, (OSCAL) 500 MG tablet Take 2 tablets (1,000 mg) by mouth 2 times daily 120 tablet 5    carvedilol (COREG) 25 MG tablet Take 1 tablet (25 mg) by mouth 2 times daily (with meals) 60 tablet 11    Cholecalciferol (VITAMIN D3) 50 MCG (2000 UT) CAPS Take 2 capsules by mouth daily 60 capsule 5    enalapril (VASOTEC) 10 MG tablet Take 1 tablet (10 mg) by mouth 2 times daily 60 tablet 11    eplerenone (INSPRA) 25 MG tablet Take 1 tablet  "(25 mg) by mouth daily 90 tablet 3    FLUoxetine (PROZAC) 10 MG capsule Take 10 mg by mouth daily      order for DME Sling for Micah Lift. 1 Units 0    predniSONE (DELTASONE) 20 MG tablet TAKE 5 TABLETS BY MOUTH TWICE WEEKLY 40 tablet 5             Review of Systems:   Gen: Negative  Eye: Negative, no vision concerns. Due for another eye appointment with good vision.   ENT: Negative, no hearing concerns. He had braces removed in July 2021. He has had issues with wisdom teeth. They are investigating options for surgery due to challenges related to mobility and anesthesia.  Pulmonary: He uses a BIPAP machine some of the time at night (6 hours).  Cardio: Negative, no dizziness or fainting.   Gastrointestinal: Some constipation and is on Colace.  Hematologic: Negative, no bruising or bleeding concerns.  Genitourinary: Negative, no bladder concerns.  Musculoskeletal: He uses a electronic chair at all times except for when he uses a lift for the bathroom and transfers.   Psychiatric: Negative  Neurologic: Negative, no headaches. No seizures.   Skin: Dry skin due to eczema. A little acne on nose and chin.  Endocrine: see HPI. He does not shave his face yet.             Physical Exam:   Blood pressure 102/58, pulse 75, temperature 97.3  F (36.3  C), temperature source Temporal, resp. rate 18, height 1.7 m (5' 6.93\"), weight 86.7 kg (191 lb 2.2 oz), SpO2 98%.  Blood pressure %natalie are not available for patients who are 18 years or older.  Height: 170 cm   17 %ile (Z= -0.94) based on CDC (Boys, 2-20 Years) Stature-for-age data based on Stature recorded on 2/23/2024.  Weight: 86.7 kg (actual weight), 89 %ile (Z= 1.22) based on CDC (Boys, 2-20 Years) weight-for-age data using vitals from 2/23/2024. 165 lbs at the time of surgery for appendicitis.  BMI: Body mass index is 30 kg/m . 95 %ile (Z= 1.64) based on CDC (Boys, 2-20 Years) BMI-for-age based on BMI available as of 2/23/2024.     GENERAL:  He is alert and in no apparent " distress. Mildly Cushingoid face.   HEENT:  Head is  normocephalic and atraumatic.  Pupils equal, round and reactive to light and accommodation.  Extraocular movements are intact.  Funduscopic exam shows crisp disc margins and normal venous pulsations.  Nares are clear.  Oropharynx shows normal dentition uvula and palate.  Tympanic membranes visualized and clear.   NECK:  Supple.  Thyroid was palpable, smooth with no nodules. It was not enlarged.    LUNGS:  Clear to auscultation bilaterally.   CARDIOVASCULAR:  Regular rate and rhythm without murmur, gallop or rub.   BREASTS:  Teo I.  Axillary hair, odor and sweat were absent.   ABDOMEN:  Nondistended.  Positive bowel sounds, soft and nontender.  No hepatosplenomegaly or masses palpable.   GENITOURINARY EXAM: Deferred, Exam was Teo V at previous visit.   MUSCULOSKELETAL: He is wheelchair bound. No evidence of scoliosis. No pain to palpation and percussion of the spine lumbar spine.   NEUROLOGIC:  Cranial nerves II-XII tested and intact.   SKIN:  No evidence of acne or oiliness. No striae.  [place] colored macules on the back.         Laboratory results:   DX HIP/PELVIS/SPINE. 6/27/2019 1:20 PM     INDICATION: Hereditary progressive muscular dystrophy (H)     COMPARISON: 3/23/18     TECHNICAL: The patient was scanned using a GE Lunar Prodigy, with  pediatric software.     Age: 13 years 8 months  Gender: Male  Race/Ethnicity: White  Referring Physician: LOTUS DURAN     FINDINGS:     Image quality: adequate  Height: 61.0 inches   Weight: 152.9 lbs.  Height percentile for age: 5  Height age included if height less than 3rd percentile     Densitometry results:  Spine L2-L4, L1 excluded due to >1 SD compared to other lumbar  vertebrae  Chronological age BMD Z-score: -2.3  Bone Mineral Density: 0.735 gm/cm2  Percent change: 1.2%     Total Body Less Head:  Chronological age BMD Z-score: -3.0  Bone Mineral Density: 0.677 gm/cm2  Percent change: 2.6%     Body  "Composition:  Lean body mass for height centile: 1%  % body fat: 59.7%                                                                      IMPRESSION:   1. Bone mineral density below the expected range for age with no  significant change since DXA on 3/23/18.  2. L1 excluded due to >1 SD compared to other lumbar vertebrae, may  represent vertebral compression fracture based upon wedge deformity  seen on lateral spine radiograph on 6/27/19.  3. Elevated percent body fat.  4. Consider repeating DXA no sooner than 12 months unless clinically  indicated.     According to the ISCD October 2007 Position Statements at www.iscd.org   \"the diagnosis of osteoporosis in males and females ages 5 - 19  requires the presence of both a clinically significant fracture  history (one long bone fracture of the lower extremities, vertebral  compression fracture, or 2+ long bone fractures of the upper  extremities) and low bone mineral density. Low bone mineral density is  defined as BMD Z-score less than or equal to - 2.0 adjusted for age,  gender and body size as appropriate.\"  The least significant change (LSC) for AP Spine = 2%  *HAZ BMD Z-score is an adjustment of the BMD Z-score for short stature  (height <3%).  Body Composition: Cutoffs for Body Fatness from Jordonman et al. Arch  Ped Adol Med 2009;163(9):805.     Age, y      Normal       Moderate       Elevated     Boys  <9           <22%           22-26%           >26%  9-11.9     <24%           24-34%           >34%  12-14.9   <23%           23-32%           >32%  >=15       <22%           22-29%           >29%     Girls  <9           <27%           27-34%           >34%  9-11.9     <30%           30-37%           >37%  12-14.9   <32%           32-39%           >39%  >=15       <36%           36-42%           >42%     ANGELITO RAMOS MD    Component      Latest Ref Rng & Units 6/28/2019   Sodium      133 - 143 mmol/L 140   Potassium      3.4 - 5.3 mmol/L 3.8   Chloride      98 " - 110 mmol/L 107   Carbon Dioxide      20 - 32 mmol/L 25   Anion Gap      3 - 14 mmol/L 8   Glucose      70 - 99 mg/dL 79   Urea Nitrogen      7 - 21 mg/dL 8   Creatinine      0.39 - 0.73 mg/dL 0.28 (L)   GFR Estimate      >60 mL/min/1.73:m2 GFR not calculated, patient <18 years old.   GFR Estimate If Black      >60 mL/min/1.73:m2 GFR not calculated, patient <18 years old.   Calcium      9.1 - 10.3 mg/dL 8.4 (L)   Bilirubin Total      0.2 - 1.3 mg/dL 1.2   Albumin      3.4 - 5.0 g/dL 3.5   Protein Total      6.8 - 8.8 g/dL 6.5 (L)   Alkaline Phosphatase      130 - 530 U/L 131   ALT      0 - 50 U/L 120 (H)   AST      0 - 35 U/L 104 (H)   Cholesterol      <170 mg/dL 177 (H)   Triglycerides      <90 mg/dL 142 (H)   HDL Cholesterol      >45 mg/dL 43 (L)   LDL Cholesterol Calculated      <110 mg/dL 106   Non HDL Cholesterol      <120 mg/dL 134 (H)   25 OH Vit D2      ug/L <5   25 OH Vit D3      ug/L 29   25 OH Vit D total      20 - 75 ug/L <34   IGF Binding Protein3      3.3 - 10.3 ug/mL 5.4   IGF Binding Protein 3 SD Score       NEG 0.8   FSH      0.5 - 10.7 IU/L 0.8   Lutropin      0.5 - 7.9 IU/L 0.3 (L)   Testosterone Total      0 - 1,200 ng/dL 8   Hemoglobin A1C      0 - 5.6 % 4.9   Thyroglobulin Antibody      <40 IU/mL <20   Thyroid Peroxidase Antibody      <35 IU/mL <10   TSH      0.40 - 4.00 mU/L 1.00   T4 Free      0.76 - 1.46 ng/dL 1.28   Lab Scanned Result       IGF-1 PEDIATRIC-Scanned (A)   Bone Spec Alk Phosphatase      27.8 - 210.9 ug/L 25.8 (L)   Parathyroid Hormone Intact      18 - 80 pg/mL 31   Phosphorus      2.9 - 5.4 mg/dL 4.3   Magnesium      1.6 - 2.3 mg/dL 2.3     6/28/19  IGF-1 to [facility]: 171 ng/dL (187-599)  IGF-1 Z-Score: -2 SDS     Results for orders placed or performed in visit on 02/23/24   CBC with platelets and differential     Status: None   Result Value Ref Range    WBC Count 9.5 4.0 - 11.0 10e3/uL    RBC Count 5.14 4.40 - 5.90 10e6/uL    Hemoglobin 14.2 13.3 - 17.7 g/dL    Hematocrit 44.2  40.0 - 53.0 %    MCV 86 78 - 100 fL    MCH 27.6 26.5 - 33.0 pg    MCHC 32.1 31.5 - 36.5 g/dL    RDW 14.3 10.0 - 15.0 %    Platelet Count 315 150 - 450 10e3/uL    % Neutrophils 58 %    % Lymphocytes 30 %    % Monocytes 7 %    % Eosinophils 3 %    % Basophils 1 %    % Immature Granulocytes 1 %    NRBCs per 100 WBC 0 <1 /100    Absolute Neutrophils 5.7 1.6 - 8.3 10e3/uL    Absolute Lymphocytes 2.9 0.8 - 5.3 10e3/uL    Absolute Monocytes 0.7 0.0 - 1.3 10e3/uL    Absolute Eosinophils 0.2 0.0 - 0.7 10e3/uL    Absolute Basophils 0.1 0.0 - 0.2 10e3/uL    Absolute Immature Granulocytes 0.1 <=0.4 10e3/uL    Absolute NRBCs 0.0 10e3/uL   CBC with platelets differential     Status: None    Narrative    The following orders were created for panel order CBC with platelets differential.  Procedure                               Abnormality         Status                     ---------                               -----------         ------                     CBC with platelets and d...[[id number]]                      Final result                 Please view results for these tests on the individual orders.   Results for orders placed or performed during the hospital encounter of 02/23/24   Echo Pediatric (TTE) Complete     Status: None    Narrative    [id number]  Formerly [facility]  [id number]  [id number]^YUAN^YASMANY                                                               Study ID: [id number]                                                 [facility] [address]                                                Phone: [phone]                                Pediatric Echocardiogram  ______________________________________________________________________________  Name: PEE RUTLEDGE  Study Date: 02/23/2024 02:32 PM                       Patient Location: [facility]  MRN: [id number]                                      Age: 18 yrs  : 2005                                      BP: 102/58 mmHg  Gender: Male                                         HR: 63  Patient Class: Outpatient                            Height: 170 cm  Ordering Provider: MIKAL BOLDEN            Weight: 87 kg  Referring Provider: ANGELITO RAMOS            BSA: 2.0 m2  Performed By: Kwame Drew  Report approved by: Shimon Humphreys MD  Reason For Study: 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 Normal right and  left ventricular size and systolic function. The calculated biplane left  ventricular ejection fraction is 55 %. No pericardial effusion.  ______________________________________________________________________________  Technical information:  A complete two dimensional, MMODE, spectral and color Doppler transthoracic  echocardiogram is performed. Technically difficult study due to poor acoustic  windows. The subcostal views were difficult to obtain and are suboptimal in  quality. The apical views were difficult to obtain and are suboptimal in  quality. Prior echocardiogram available for comparison. ECG tracing shows  regular rhythm.     Segmental Anatomy:  There is normal atrial arrangement, with concordant atrioventricular and  ventriculoarterial connections.     Systemic and pulmonary veins:  There is a right-sided superior vena cava draining normally to the right  atrium. The pulmonary venous return is not evaluated.     Atria and atrial septum:  Normal right atrial size. The left atrium is normal in size. The atrial septum  is not well visualized.     Atrioventricular valves:  The tricuspid valve is normal in appearance and motion. Trivial  tricuspid  valve insufficiency. Insufficient jet to estimate right ventricular systolic  pressure. The mitral valve is normal in appearance and motion. There is no  mitral valve insufficiency.     Ventricles and Ventricular Septum:  Normal right ventricular size. Normal right ventricular systolic function.  Normal left ventricular size. Normal left ventricular systolic function. The  calculated biplane left ventricular ejection fraction is 55 %.     Outflow tracts:  Normal great artery relationship. There is unobstructed flow through the right  ventricular outflow tract. The pulmonary valve motion is normal. There is  normal flow across the pulmonary valve. Trivial pulmonary valve insufficiency.  There is unobstructed flow through the left ventricular outflow tract. There  is normal flow across the aortic valve.     Great arteries:  The main pulmonary artery has normal appearance. There is unobstructed flow in  the main pulmonary artery. The branch pulmonary arteries are not seen with  this study. Normal ascending aorta. There is normal antegrade flow in the  descending thoracic aorta. There is normal pulsatile flow in the descending  abdominal aorta. The aortic arch appears normal.     Arterial Shunts:  The ductal region is not imaged with this study.     Coronaries:  The origins of the coronary arteries are not well visualized.     Effusions, catheters, cannulas and leads:  No pericardial effusion.     MMode/2D Measurements & Calculations  LA dimension: 2.3 cm                       Ao root diam: 2.9 cm  LA/Ao: 0.79                                2 Chamber EF: 56.5 %  4 Chamber EF: 55.1 %                       EF Biplane: 55.2 %  LVMI(BSA): 52.8 grams/m2                   LVMI(Height): 25.8     RWT(MM): 0.31     Doppler Measurements & Calculations  MV E max sofia: 65.0 cm/sec                PA V2 max: 114.0 cm/sec  MV A max sofia: 36.7 cm/sec                PA max P.2 mmHg  MV E/A: 1.8     desc Ao max sofia: 160.0  cm/sec  desc Ao max PG: 10.2 mmHg     [place] 2D Z-SCORE VALUES  Measurement NameValue Z-ScorePredictedNormal Range  asc Aorta(2D)   2.1 cm-1.8   2.7      2.1 - 3.3  LVLd apical(4ch)7.5 cm-1.2   8.4      6.9 - 9.8  LVLs apical(4ch)6.2 cm-1.2   7.0      5.7 - 8.2     [place] Z-Scores (Measurements & Calculations)  Measurement NameValue      Z-ScorePredictedNormal Range  IVSd(MM)        0.78 cm    -1.6   1.0      0.72 - 1.35  LVIDd(MM)       4.6 cm     -1.7   5.3      4.5 - 6.1  LVIDs(MM)       3.2 cm     -0.68  3.4      2.7 - 4.2  LVPWd(MM)       0.71 cm    -1.9   0.97     0.71 - 1.23  LV mass(C)d(MM) 108.2 grams-3.1   200.7    135.3 - 297.6  FS(MM)          31.5 %     -1.0   34.9     28.7 - 42.6     Report approved by: Aimee Painter 02/23/2024 03:15 PM         Results for orders placed or performed in visit on 02/23/24   General PFT Lab (Please always keep checked)     Status: None (Preliminary result)   Result Value Ref Range    FVC-Pred 5.00 L    FVC-Pre 1.64 L    FVC-%Pred-Pre 32 %    FEV1-Pre 0.77 L    FEV1-%Pred-Pre 17 %    FEV1FVC-Pred 87 %    FEV1FVC-Pre 47 %    FEFMax-Pred 9.81 L/sec    FEFMax-Pre 1.42 L/sec    FEFMax-%Pred-Pre 14 %    FEF2575-Pred 4.78 L/sec    FEF2575-Pre 0.34 L/sec    VHK0432-%Pred-Pre 7 %    ExpTime-Pre 7.49 sec    FIFMax-Pre 1.53 L/sec    MEP-Pre 38 cmH2O    MIP-Pre -35 cmH2O    FEV1FEV6-Pred 85 %    FEV1FEV6-Pre 47 %   Results for orders placed or performed in visit on 02/23/24   Dexa hip/pelvis/spine     Status: None    Narrative    DX HIP/PELVIS/SPINE. 2/23/2024 12:16 PM    INDICATION: Osteopenia of multiple sites; Other osteoporosis without  current pathological fracture    COMPARISON: 10/5/2020    TECHNICAL: The patient was scanned using a GE Lunar Prodigy, with  pediatric software.    Age: 18 years 4 months  Gender: Male  Race/Ethnicity: White  Referring Physician: ANGELITO RAMOS    FINDINGS:    Image quality: adequate  Height: 67.0 inches   Weight: 191.0 lbs.  Height  "percentile for age: 18  Height age included if height less than 3rd percentile    Densitometry results:  Spine L1-L4  Chronological age BMD Z-score: -2.0  Bone Mineral Density: 0.961 gm/cm2  Percent change: 18.1%, significant increase    Total Body Less Head:  Chronological age BMD Z-score: -3.3  Bone Mineral Density: 0.763 gm/cm2  Percent change: 10.4%, significant increase    Hips:   Mean femoral neck BMD: 0.513 gm/cm2    Body Composition:  % body fat: 64.0%      Impression    IMPRESSION:   1. Bone mineral density below the expected range for age with  significant increase since DXA on 10/5/2020.  2. Elevated percent body fat.  3. Consider repeating DXA no sooner than 12 months unless clinically  indicated.    According to the ISCD October 2007 Position Statements at www.iscd.org   \"the diagnosis of osteoporosis in males and females ages 5 - 19  requires the presence of both a clinically significant fracture  history (one long bone fracture of the lower extremities, vertebral  compression fracture, or 2+ long bone fractures of the upper  extremities) and low bone mineral density. Low bone mineral density is  defined as BMD Z-score less than or equal to - 2.0 adjusted for age,  gender and body size as appropriate.\"  The least significant change (LSC) for AP Spine = 2%  *HAZ BMD Z-score is an adjustment of the BMD Z-score for short stature  (height <3%).  Body Composition: Cutoffs for Body Fatness from Shmuel et al. Arch  Ped Adol Med 2009;163(9):805.    Age, y      Normal       Moderate       Elevated    Boys  <9           <22%           22-26%           >26%  9-11.9     <24%           24-34%           >34%  12-14.9   <23%           23-32%           >32%  >=15       <22%           22-29%           >29%    Girls  <9           <27%           27-34%           >34%  9-11.9     <30%           30-37%           >37%  12-14.9   <32%           32-39%           >39%  >=15       <36%           36-42%           " >42%    ANGELITO RAMOS MD         SYSTEM ID:  [id number]            Assessment and Plan:   1. Osteoporosis with vertebral compression fracture  2. Chronic steroid therapy  3. Duchenne muscular dystrophy   4. Hypocalcemia following zoledronate therapy  5. Delayed Puberty    Christopher has a history of vertebral compression fractures due to chronic steroid therapy, weakness, and decreased mobility related to Duchenne muscular dystrophy. Christopher had a hypocalcemic episode following bisphosphonate therapy requiring hospitalization. His most recent infusion occurred on 9/9/2021.  He did have some difficulty with hypocalcemia following the first infusion.  Due to his reaction, and no new fractures, we have not repeated the zoledronate infusion.  The repeat DXA scan today shows continued low bone mineral density with some improvement over time. We will plan to repeat the DXA at our next visit to help determine if any further zoledronate infusions would be warranted.     Boys with Duchenne muscular dystrophy have late pubertal development and may benefit from testosterone therapy with improvement in bone mineral density. Christopher completed testosterone therapy May 2020.  The testosterone therapy may also improve the bone mineral density over time.  Since puberty has not been significantly progressing, I recommended a second course of testosterone therapy.  We will reassess his ability to progress through puberty by repeating testosterone levels today.    MD Instructions:  I recommend continuing the current dose of calcium. I recommend continuing the current dose of vitamin D.  Please get DXA before our next visit (the day of or the day before).     Tests to be obtained prior to next visit:  Orders Placed This Encounter   Procedures    Dexa Body Composition    Dexa hip/pelvis/spine    Comprehensive metabolic panel    Phosphorus    TSH    T4 free    Parathyroid Hormone Intact    Vitamin D2 + D3, 25 Hydroxy    FSH    Luteinizing Hormone     Testosterone total    CBC with platelets differential     RTC for follow up evaluation in 9-12 months.     Thank you for allowing me to participate in the care of your patient.  Please do not hesitate to call with questions or concerns.    Sincerely,    I personally performed the entire clinical encounter documented in this note.    David Lew MD, PhD  Professor  Pediatric Endocrinology  [facility]  Phone: [phone]  Fax:   [phone]     Face-to-face time 20 minutes, total visit time 40 minutes on date of visit including review of records and documentation.     The longitudinal plan of care for the diagnosis(es)/condition(s) as documented were addressed during this visit. Due to the added complexity in care, I will continue to support Christopher in the subsequent management and with ongoing continuity of care.   CC  Patient Care Team:  Jose Finn as PCP - General (Family Medicine)  Tray Cavazos MD as MD (Pediatric Neurology)  Selene Campos MD as MD (Pediatric Cardiology)  Jose Finn (Family Practice)  Melanie Delatorre MD as MD (INTERNAL MEDICINE - ENDOCRINOLOGY, DIABETES & METABOLISM)  Courtney Martinez MD as MD (Pediatric Pulmonology)  Tray Cavazos MD as Assigned Neuroscience Provider  Rachel Romero, RN as Registered Nurse  Deangelo, Peter Romero MD as Assigned Pediatric Specialist Provider     Parents of Christopher Gorman  [address] [place] [address]  [place] [address]

## 2024-02-23 NOTE — NURSING NOTE
"Chief Complaint   Patient presents with    RECHECK     Pt arrived with mom and dad for RETURN PEDS ALD ENDO       /58 (BP Location: Left arm, Patient Position: Sitting, Cuff Size: Adult Regular)   Pulse 75   Temp 97.3  F (36.3  C) (Temporal)   Resp 18   Ht 5' 6.93\" (170 cm)   Wt 191 lb 2.2 oz (86.7 kg)   SpO2 98%   BMI 30.00 kg/m      Giancarlo Quijano    "

## 2024-02-23 NOTE — PATIENT INSTRUCTIONS
Thank you for choosing [facility].     It was a pleasure to see you today.     PLEASE SCHEDULE A RETURN APPOINTMENT AS YOU LEAVE.  This will prevent delays in getting a return for appropriate time frame.      Providers:       [place]:    MD Joan Cedeño, MD Wilver Jones MD, MD Annita Kamara, MD David Lew MD PhD      Deana Franco APRN LILLIAN Varela [facility]    Important numbers:  Care Coordinators (non urgent calls) Mon- Fri: [phone]  Fax: [phone]  KELLY Sandoval RN   Melanie Guerrero, RN CPN    Re Rea MS  RN      Growth Hormone: Seniat Vela CMA     Scheduling:    Access Center: [phone] for [facility] [address] 9th [facility] [phone] (for stimulation tests)  Radiology/ [phone]   Services:   [phone]     Calls will be returned as soon as possible once your physician has reviewed the results or questions.   Medication renewal requests must be faxed to the main office by your pharmacy.  Allow 3-4 days for completion.   Fax: [phone]    Mailing Address:  Pediatric Endocrinology  [facility] [address]  [address]    Test results may be available via [employer] prior to your provider reviewing them. Your provider will review results as soon as possible once all labs are resulted.   Abnormal results will be communicated to you via Mop[employer], telephone call or letter.  Please allow 2 -3 weeks for processing/interpretation of most lab work.  If you live in the [facility] area and need labs, we request that the labs be done at an [facility] facility.  [facility] locations are listed on the [email or web address] website. Please call that site for a lab time.   For urgent issues that cannot wait until the next business day, call [phone]  and ask for the Pediatric Endocrinologist on call.    Please sign up for [employer] for easy and HIPAA compliant confidential communication at the clinic  or go to [email or web address]   Patients must be seen in clinic annually to continue to receive prescription refills and test results.   Patients on growth hormone must be seen at least twice yearly.      MD Instructions:  I recommend continuing the current dose of calcium. I recommend continuing the current dose of vitamin D.  Please get DXA before our next visit (the day of or the day before).

## 2024-02-23 NOTE — LETTER
2/23/2024      RE: Christopher Gorman  [address]  [place] [address]     Dear Colleague,    Thank you for the opportunity to participate in the care of your patient, Christopher Gorman, at the [facility] at [facility]. Please see a copy of my visit note below.    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 He was seen for a genetic counseling appointment in coordination with his other care team providers today. He was accompanied by his parents and his brother.     Please refer to Dr. Cavazos, Dr. Murphy, and Adeline Smith's note from today for further details of Christopher's medical history and evaluation from today.     Family History: A three generation pedigree was obtained and scanned into the electronic medical record. The relevant portions are described below:    Siblings-   Brother, Holger, is 18 years old and has DMD due to exon 55 deletion.   Parents-   Mother, Mere, is 46 years old and is a carrier for the DMD variant (she reports she had testing long ago). She has not had cardiac screening so a referral was placed for her today. Mere also has a history of Grave's disease and hypothyroidism.   Father, Yogesh, is 46 years old and is healthy.   Maternal Relatives-   One maternal uncle passed away at age 16 due to DMD  One maternal aunt, age 50, who is a known carrier for DMD. She has a 14 year old son with DMD. She also has two daughters.   One maternal aunt, age 44, who is healthy as are her children.   One maternal uncle age 54 who is healthy as are his children.   One maternal uncle age 52 who is healthy as are his children.   Maternal grandfather is 79 years old and has a history of high  "blood pressure and enlarged heart.   Maternal grandmother was an obligate DMD carrier. She passed away at age 73 due to complications from diabetes but she did have a \"heart condition\" not further specified.   She had a brother who passed away from DMD. She had two sisters who were both carriers and one of whom had a daughter who was reportedly affected with muscular dystrophy.  Paternal Relatives-   Three paternal uncles and one paternal aunt who are in their 30-40's and are all generally well. The uncles all have children who are reported to be healthy.   Paternal grandparents are in their 70's and are healthy.    Family history is otherwise largely non-contributory. Consanguinity was denied.     Genetic Counseling Discussion:  Our bodies are made of cells that contain our chromosomes. We have 22 identical pairs of chromosomes numbered 1-22 and one pair of sex chromosomes called X and Y. Females have two X chromosomes and males have one X and one Y chromosome. Our chromosomes are made up of long stretches of DNA containing our genes. Our genes serve as the instructions for our bodies to grow and function. Our genes are divided into different numbered subunits called exons.     Dystrophinopathies refer to a group of neuromuscular conditions caused by changes (mutations) in dystrophin gene (DMD gene), which is located on the X chromosome.  There have been over 5000 different mutations described in the DMD gene. About 65-80% of mutations in the DMD gene are small deletions or duplications of exons in DMD. The remaining 20-35% of mutations are sequence changes/ point mutations. The dystrophin protein is essential for maintaining muscle strength.  If the dystrophin protein is not made properly or is missing, it causes muscle to breakdown at a rapid rate that causes muscle weakness.  Mutations in the dystrophin gene can also cause cardiomyopathy (a weakening of the heart muscles), scoliosis, respiratory problems, " "behavioral problems and learning disabilities. For this reason it is important for people with dystrophinopathies to have annual or biannual evaluations with a multidisciplinary care team that includes a Neurologist, Cardiologist, Pulmonologist, Physical Therapist, and sometimes an Orthopedist.     The dystrophinopathies cover a spectrum of muscle diseases that are called Duchenne muscular dystrophy (DMD), Treadwell muscular dystrophy (BMD), DMD-associated dilated cardiomyopathy (DCM). DMD presents at younger ages and has a faster progression of muscle weakness with affected boys needing a wheelchair usually by age 12, while BMD tends to present later in childhood and has a slower progression of muscle weakness. Individuals with a dystrophinopathy are typically classified as having DMD or BMD based on their age of onset of muscle weakness, progression of their muscle weakness, and sometimes based on their specific mutation in the DMD gene.     To elaborate on the genetics of dystrophinopathies further, our cells \"read\" our DNA code and exons to create the proteins our bodies need. They read these DNA codes in different segments which are called reading frames. Some deletions and duplications in the DMD gene disrupt, or cut off, a reading frame in the gene. This is referred to as \"out of frame\" and this typically causes a dysfunctional dystrophin protein to be made. Other deletions/duplications in the DMD gene maintain the reading frame so the cell can still read the gene's code, but it makes a shorter dystrophin protein than usual. This is referred to as \"in-frame.\" Generally, DMD deletions/duplications that are \"in-frame\" cause milder disease (usually BMD). About 90% of deletions/duplications in the DMD gene follow this rule, so it can be a helpful predictor but it not always accurate in predicting disease severity.     Christopher's  mutation in the DMD gene is a deletion of exon 55.    The dystrophinopathies are inherited " in an X-linked inheritance pattern. As previously mentioned, the DMD gene is on the X chromosome and because males only have one X chromosome, if they have a mutation in their DMD gene they will be affected. Males can inherit their DMD mutation from their mother or their mutation could be a new change in them for the first time (de krupa). Approximately 1/3 of boys with DMD/BMD have the condition as the result of a new mutation. Females who carry one DMD mutation are considered carriers. If a woman is a carrier for DMD/BMD, there is a 50% chance she will have an affected son and a 50% chance she will have a carrier daughter with each pregnancy. Males with severe forms of dystrophinopathies typically do not reproduce. Males with a milder dystrophinopathy would not pass their DMD mutation to any of their sons and they would pass their mutation to all of their daughters who would be carriers.    We discussed that a small portion of female carriers can present with mild muscle weakness, cramps, and fatigue in adulthood but most female carriers (80-90%) remain asymptomatic. Carrier females are also at increased at risk of developing cardiomyopathy in adulthood. For this reason, cardiac evaluation and screening is recommended for women who are carriers for DMD/BMD. There are no specific guidelines about the age at which female carriers should start cardiac screening, but generally, it is thought that females should start cardiac screening in their late teens/early adulthood, and if normal, repeat every 3-5 years.     As previously mentioned, it is important for males with a dystrophinopathy to be followed by a multidisciplinary team to treat symptoms and ensure they are getting the therapies they need. Males with DMD and BMD are typically given steroid treatment (prednisone, deflazacort, or AGAMREE (vamorolone) which was FDA approved in 2023) to help slow skeletal muscle damage and weakness. Many may also be started on an  ACE inhibitor (or an angiotensin II-receptor blockers (ARBs) such as losartan) and/or a beta blocker to try to help improve left ventricular function of the heart.     While there is currently no cure for DMD/BMD there are several treatments that target the underlying genetic mechanism in DMD that are in clinical trial phases and some that have been FDA approved. These treatments have limitations based on the causative DMD variant:  Four treatments that have been FDA approved target exon skipping (Eteplirsen/Exondys 51, Casimersen/Amondys 45, Golodirsen/Vyondys 53, and Viltepso). By skipping the nonfunctional exons (using an antisense oligonucleotide), a shortened dystrophin protein can be produced which may have some functionality.    Ataluren is an oral small molecule treatment that allows for stop-codon read-through to produce dystrophin in patients with nonsense mutations. This drug has been in trials since 2014 and the company that makes this drug is still seeking FDA approval.   In June 2023 FDA approved ELEVIDYS which is an adeno-associated virus (AAV) vector-based gene therapy. This treatment is only FDA approved for ambulatory pediatric patients aged 4 through 5 years with Duchenne muscular dystrophy. ELEVIDYS is contraindicated in patients with any deletion in exon 8 and/or exon 9 in the DMD gene  There are several other companies with active clinical trials for other gene therapies and other targeted treatments for DMD/BMD    Christopher was given a handout with diagrams explaining genetics, the DMD gene, his DMD mutation, and inheritance pattern, including risks to his future children.     It was a pleasure meeting Holger White and their parents today. They were encouraged to reach out to me if they have any further questions.     Plan:  Handout explaining the genetic of DMD was given to Christopher  A Cardiology referral was placed for his mother Mere  The family can reach out to me anytime with additional  questions      Desiree Minaya MS, [facility]  Licensed Genetic Counselor  [facility] [place]  Phone: [phone]  Fax: [phone]    Time spent in consultation face to face was approximately 20 minutes.

## 2024-02-23 NOTE — LETTER
2024       RE: Christopher Gorman  [address]  [place] [address]     Dear Colleague,    Thank you for referring your patient, Christopher Gorman, to the [facility] at [facility]. Please see a copy of my visit note below.    Pediatrics Pulmonary - Provider Note  General Pulmonary - Return Visit    Patient: Christopher Gorman MRN# [id number]   Encounter: 2024 : 2005      I saw Christopher at the Pediatric Pulmonary Clinic for a routine MDA visit accompanied by his Mother and father.    Subjective:   HPI: Last visit was on 23.    Juanito is a 18-year-old male patient with history of Duchenne muscular dystrophy, restrictive lung disease and nocturnal hypoventilation as demonstrated on sleep study completed in 2019 when he was found to have an RDI of 9.5 and sleep fragmentation.      Since last visit he had no respiratory infections. He has not needed any steroids or antibiotics. He denies cough, or shortness of breath.   He is continue using his AVAPS every night    He denies issues with mask, or pressure tolerance. Reports getting better sleep with use of AVAP    Per vent download, his compliance was 100%  Settings: AVAPS; EPAP 6, IPAP 10-25, R 8, and Tv 550ml.  He reports that the vent will occasionally give him a breath before he wants it.  He triggers 62% of his breaths. Avg RR 13.6 with lowest being 10.      Current bedtime is at 9-930 PM on weekdays/weekends.  Sleep latency is usually 30 minutes because he is on his phone.  He wakes up on avg 3 times per night to be repositioned. Wakes up at 830-9 AM on weekdays/weekends. He does not take naps, no reported excessive daytime sleepiness.      He is using breath stacking 3 times per day.    He had a sleep study done on 10/2/2022   Residual AHI 0.5 events per hour, however no REM supine on the final pressure. Loud snoring, normal respiratory rate and pattern.  "No sleep associated hypoxemia or hypoventilation.Continue iVAPS 16/4.8/5/4/20/1.5/0.8/300 cmH2O    Severely elevated periodic limb movement associated with arousals.    Allergies  Allergies as of 02/23/2024     (No Known Allergies)     Current Outpatient Medications   Medication Sig Dispense Refill     acetaminophen (TYLENOL) 325 MG tablet Take 2 tablets (650 mg) by mouth every 6 hours as needed for mild pain or fever 1 Bottle 0     Ascorbic Acid (VITAMIN C PO) Take 3 tablets by mouth daily       CALCIUM ANTACID 500 MG chewable tablet 1 chew tab 2 times daily  0     calcium carbonate 500 mg, elemental, (OSCAL) 500 MG tablet Take 2 tablets (1,000 mg) by mouth 2 times daily 120 tablet 5     carvedilol (COREG) 25 MG tablet Take 1 tablet (25 mg) by mouth 2 times daily (with meals) 60 tablet 11     Cholecalciferol (VITAMIN D3) 50 MCG (2000 UT) CAPS Take 2 capsules by mouth daily 60 capsule 5     enalapril (VASOTEC) 10 MG tablet Take 1 tablet (10 mg) by mouth 2 times daily 60 tablet 11     eplerenone (INSPRA) 25 MG tablet Take 1 tablet (25 mg) by mouth daily 90 tablet 3     FLUoxetine (PROZAC) 10 MG capsule Take 10 mg by mouth daily       order for DME Sling for Micah Lift. 1 Units 0     predniSONE (DELTASONE) 20 MG tablet TAKE 5 TABLETS BY MOUTH TWICE WEEKLY 40 tablet 5       Past medical history, surgical history and family history reviewed with patient/parent today, no changes.      RoS  A comprehensive review of systems was performed and is negative except as noted in the HPI.      Objective:     Physical Exam  /58 (BP Location: Right arm, Patient Position: Chair, Cuff Size: Adult Large)   Pulse 75   Temp 97.3  F (36.3  C) (Oral)   Resp 18   Ht 5' 6.93\" (170 cm)   Wt 191 lb 2.2 oz (86.7 kg)   SpO2 98%   BMI 30.00 kg/m    Ht Readings from Last 2 Encounters:   02/23/24 5' 6.93\" (170 cm) (17%, Z= -0.94)*   02/23/24 5' 6.93\" (170 cm) (17%, Z= -0.94)*     * Growth percentiles are based on CDC (Boys, 2-20 " Years) data.     Wt Readings from Last 2 Encounters:   02/23/24 191 lb 2.2 oz (86.7 kg) (89%, Z= 1.22)*   02/23/24 191 lb 2.2 oz (86.7 kg) (89%, Z= 1.22)*     * Growth percentiles are based on CDC (Boys, 2-20 Years) data.     BMI %: > 36 months -  95 %ile (Z= 1.64) based on CDC (Boys, 2-20 Years) BMI-for-age based on BMI available as of 2/23/2024.    Constitutional:  No distress, comfortable, pleasant. Sitting in motorized wheelchair in no acute distress  Vital signs:  Reviewed and normal.  Eyes:  Anicteric, normal extra-ocular movements.  Ears, Nose and Throat:   No rhinorrhea.  Good palatal elevation.  Throat was clear..  Neck:  Supple with full range of motion, no lymphadenopathy.  Cardiovascular:   Normal S1 and S2.  No gallop or murmurs.  Chest:  Symmetrical, no retractions.  Respiratory: Moderately reduced breath sound amplitude particularly posteriorly at lung bases.  Louder breath sounds anteriorly, but no adventitious sounds heard anywhere.  Gastrointestinal:  Positive bowel sounds, no hepatosplenomegaly, no masses,   Musculoskeletal: No digital clubbing.  Skin: Mild facial acne.  Neurological:  Wheelchair-bound with generalized hypotonia.      Spirometry performed today in the office setting.   The results of this test does not meet the ATS standards for acceptability and repeatability due to end of test criteria not being met.    Results reported in percent predicted or ratio. FVC was 32, FEV1 was 17 and NZI13-10% was 7. FEV1/FVC was 47. Expiratory flow volume loop was restricted.     MEP +38 cmH20 (last time +44)  MIP -35 cmH20 (last time -42)   L/min (last time 260)  ETCO2 36 mmHg (last time 42)    Spirometry Interpretation:  Severe restriction with minimal decrease in lung function since last PFT.  Mild decrease in cough strength  No significant change in MIP/MEP or PCF      Assessment     Juanito is a 18year-old male with history of Duchenne muscular dystrophy with severe restrictive pattern  pulmonary function test which has been slightly decreased from last as well as nocturnal hypoventilation. His ventilator is occasionally giving him a breath before Christopher would like it. With him only triggering 62% of breaths, we will decrease the RR from 10 to 8 to allow for Christopher to drive his respiration initiation. Lung function is minimally decreased from last testing likely from underlying diagnosis. Will continue to trend lung function at every visit.      Plan:     1. Restrictive lung disease due to muscular dystrophy (H)  2. VIVIANE (obstructive sleep apnea)  - Decrease NIV rate from 10 to 8.  - Continue breath stacking techniques 3 times per day.  - Follow up in 6 months.      Ismael Mckeon DO, PGY-5  [facility]  Pediatric Pulmonology Fellow      Jose Lin    Copy to patient  MIRNA GORMAN TRAVIS  [address]  [place] [address]            Attestation signed by Kilo Murphy MD at 3/1/2024  1:19 PM:  I, Kilo Murphy, saw and evaluated Christopher Gorman today with the pediatric pulmonology fellow who participated in the service and in the documentation of the note. I have verified the history and personally performed the substantive portion of the physical exam - please see this documentation for full details.  I supervised in-person or performed medical decision making.  I agree with the assessment and plan of care as documented in the note.     I personally reviewed vital signs, medications, and labs.    Date of Service (when I saw the patient): Feb 23, 2024    Kilo Murphy MD (Paul), FRCP(), FRCPCH()  Professor of Pediatrics  Chief, Division of Pediatric Pulmonary & Sleep Medicine  [facility]     Again, thank you for allowing me to participate in the care of your patient.      Sincerely,    Kilo Murphy MD

## 2024-02-23 NOTE — NURSING NOTE
[id number]  Chief Complaint   Patient presents with    Follow Up     MD     Initial /58 (BP Location: Right arm, Patient Position: Sitting, Cuff Size: Adult Large)   Pulse 75   Temp 97.3  F (36.3  C) (Oral)   Resp 18   Ht 5' 6.93\" (170 cm)   Wt 191 lb 2.2 oz (86.7 kg)   SpO2 98%   BMI 30.00 kg/m   Estimated body mass index is 30 kg/m  as calculated from the following:    Height as of this encounter: 5' 6.93\" (170 cm).    Weight as of this encounter: 191 lb 2.2 oz (86.7 kg).  Medication Reconciliation: complete    Does the patient need any medication refills today? No    Does the patient/parent need MyChart or Proxy acces today? Yes    Does the patient want a flu shot today? No            Stephan Roy MA         "

## 2024-02-23 NOTE — LETTER
2024      RE: Christopher Gorman  [address]  [place] [address]     Dear Colleague,    Thank you for the opportunity to participate in the care of your patient, Christopher Gorman, at the [facility] PEDIATRIC SPECIAL[facility]INIC at [facility]. Please see a copy of my visit note below.    Pediatrics Pulmonary - Provider Note  General Pulmonary - Return Visit    Patient: Christopher Gorman MRN# [id number]   Encounter: 2024 : 2005      I saw Christopher at the Pediatric Pulmonary Clinic for a routine MDA visit accompanied by his Mother and father.    Subjective:   HPI: Last visit was on 23.    Juanito is a 18-year-old male patient with history of Duchenne muscular dystrophy, restrictive lung disease and nocturnal hypoventilation as demonstrated on sleep study completed in 2019 when he was found to have an RDI of 9.5 and sleep fragmentation.      Since last visit he had no respiratory infections. He has not needed any steroids or antibiotics. He denies cough, or shortness of breath.   He is continue using his AVAPS every night    He denies issues with mask, or pressure tolerance. Reports getting better sleep with use of AVAP    Per vent download, his compliance was 100%  Settings: AVAPS; EPAP 6, IPAP 10-25, R 8, and Tv 550ml.  He reports that the vent will occasionally give him a breath before he wants it.  He triggers 62% of his breaths. Avg RR 13.6 with lowest being 10.      Current bedtime is at 9-930 PM on weekdays/weekends.  Sleep latency is usually 30 minutes because he is on his phone.  He wakes up on avg 3 times per night to be repositioned. Wakes up at 830-9 AM on weekdays/weekends. He does not take naps, no reported excessive daytime sleepiness.      He is using breath stacking 3 times per day.    He had a sleep study done on 10/2/2022   Residual AHI 0.5 events per hour, however no REM supine on the final pressure. Loud snoring,  "normal respiratory rate and pattern. No sleep associated hypoxemia or hypoventilation.Continue iVAPS 16/4.8/5/4/20/1.5/0.8/300 cmH2O    Severely elevated periodic limb movement associated with arousals.    Allergies  Allergies as of 02/23/2024    (No Known Allergies)     Current Outpatient Medications   Medication Sig Dispense Refill    acetaminophen (TYLENOL) 325 MG tablet Take 2 tablets (650 mg) by mouth every 6 hours as needed for mild pain or fever 1 Bottle 0    Ascorbic Acid (VITAMIN C PO) Take 3 tablets by mouth daily      CALCIUM ANTACID 500 MG chewable tablet 1 chew tab 2 times daily  0    calcium carbonate 500 mg, elemental, (OSCAL) 500 MG tablet Take 2 tablets (1,000 mg) by mouth 2 times daily 120 tablet 5    carvedilol (COREG) 25 MG tablet Take 1 tablet (25 mg) by mouth 2 times daily (with meals) 60 tablet 11    Cholecalciferol (VITAMIN D3) 50 MCG (2000 UT) CAPS Take 2 capsules by mouth daily 60 capsule 5    enalapril (VASOTEC) 10 MG tablet Take 1 tablet (10 mg) by mouth 2 times daily 60 tablet 11    eplerenone (INSPRA) 25 MG tablet Take 1 tablet (25 mg) by mouth daily 90 tablet 3    FLUoxetine (PROZAC) 10 MG capsule Take 10 mg by mouth daily      order for DME Sling for Micah Lift. 1 Units 0    predniSONE (DELTASONE) 20 MG tablet TAKE 5 TABLETS BY MOUTH TWICE WEEKLY 40 tablet 5       Past medical history, surgical history and family history reviewed with patient/parent today, no changes.      RoS  A comprehensive review of systems was performed and is negative except as noted in the HPI.      Objective:     Physical Exam  /58 (BP Location: Right arm, Patient Position: Chair, Cuff Size: Adult Large)   Pulse 75   Temp 97.3  F (36.3  C) (Oral)   Resp 18   Ht 5' 6.93\" (170 cm)   Wt 191 lb 2.2 oz (86.7 kg)   SpO2 98%   BMI 30.00 kg/m    Ht Readings from Last 2 Encounters:   02/23/24 5' 6.93\" (170 cm) (17%, Z= -0.94)*   02/23/24 5' 6.93\" (170 cm) (17%, Z= -0.94)*     * Growth percentiles are based " on [facility] (Boys, 2-20 Years) data.     Wt Readings from Last 2 Encounters:   02/23/24 191 lb 2.2 oz (86.7 kg) (89%, Z= 1.22)*   02/23/24 191 lb 2.2 oz (86.7 kg) (89%, Z= 1.22)*     * Growth percentiles are based on [facility] (Boys, 2-20 Years) data.     BMI %: > 36 months -  95 %ile (Z= 1.64) based on CDC (Boys, 2-20 Years) BMI-for-age based on BMI available as of 2/23/2024.    Constitutional:  No distress, comfortable, pleasant. Sitting in motorized wheelchair in no acute distress  Vital signs:  Reviewed and normal.  Eyes:  Anicteric, normal extra-ocular movements.  Ears, Nose and Throat:   No rhinorrhea.  Good palatal elevation.  Throat was clear..  Neck:  Supple with full range of motion, no lymphadenopathy.  Cardiovascular:   Normal S1 and S2.  No gallop or murmurs.  Chest:  Symmetrical, no retractions.  Respiratory: Moderately reduced breath sound amplitude particularly posteriorly at lung bases.  Louder breath sounds anteriorly, but no adventitious sounds heard anywhere.  Gastrointestinal:  Positive bowel sounds, no hepatosplenomegaly, no masses,   Musculoskeletal: No digital clubbing.  Skin: Mild facial acne.  Neurological:  Wheelchair-bound with generalized hypotonia.      Spirometry performed today in the office setting.   The results of this test does not meet the ATS standards for acceptability and repeatability due to end of test criteria not being met.    Results reported in percent predicted or ratio. FVC was 32, FEV1 was 17 and DHL81-02% was 7. FEV1/FVC was 47. Expiratory flow volume loop was restricted.     MEP +38 cmH20 (last time +44)  MIP -35 cmH20 (last time -42)   L/min (last time 260)  ETCO2 36 mmHg (last time 42)    Spirometry Interpretation:  Severe restriction with minimal decrease in lung function since last PFT.  Mild decrease in cough strength  No significant change in MIP/MEP or PCF      Assessment     Juanito is a 18year-old male with history of Duchenne muscular dystrophy with severe  restrictive pattern pulmonary function test which has been slightly decreased from last as well as nocturnal hypoventilation. His ventilator is occasionally giving him a breath before Christopher would like it. With him only triggering 62% of breaths, we will decrease the RR from 10 to 8 to allow for Christopher to drive his respiration initiation. Lung function is minimally decreased from last testing likely from underlying diagnosis. Will continue to trend lung function at every visit.      Plan:     1. Restrictive lung disease due to muscular dystrophy (H)  2. VIVIANE (obstructive sleep apnea)  - Decrease NIV rate from 10 to 8.  - Continue breath stacking techniques 3 times per day.  - Follow up in 6 months.      Ismael Mckeon DO, PGY-5  [facility]  Pediatric Pulmonology Fellow      Jose Lin    Copy to patient  MIRNA GORMAN TRAVIS  [address]  [place] [id number]    Attestation signed by Kilo Murphy MD at 3/1/2024  1:19 PM:  I, Kilo Murphy, saw and evaluated Christopher Gorman today with the pediatric pulmonology fellow who participated in the service and in the documentation of the note. I have verified the history and personally performed the substantive portion of the physical exam - please see this documentation for full details.  I supervised in-person or performed medical decision making.  I agree with the assessment and plan of care as documented in the note.     I personally reviewed vital signs, medications, and labs.    Date of Service (when I saw the patient): Feb 23, 2024    Kilo Murphy MD (Paul), FRCP(), FRCPCH()  Professor of Pediatrics  Chief, Division of Pediatric Pulmonary & Sleep Medicine  [facility]

## 2024-02-23 NOTE — PROGRESS NOTES
Pediatric Neuromuscular Clinic      Christopher Gorman MRN# [id number]   YOB: 2005 Age: 17 year old      Date of Visit:  Sep 6, 2023     Primary care provider: Jose Finn        History is obtained from the patient, family and medical record       Interval Change:      Christopher Gorman is a 18year 4month old male was seen and examined at the pediatric neuromuscular clinic on Sep 6, 2023 for a follow up evaluation of previously diagnosed Duchenne muscular dystrophy.   He is nonambulatory and uses a power wheelchair full-time for mobility.  He had a new power wheelchair last year in May 2023 that has a standing feature, and they use this about 2-3 times per day to stretch his legs.  They find this future quite helpful.  He is dependent for transfers and uses a lift.  He uses a chair for showering.  Currently is taking prednisone, vitamin D, Vitamin C, calcium, and heart medications.  Denies any side effects.  Denies any new shortness of breath.  Does have baseline neuromuscular weakness with breathing overnight and uses BiPAP consistently.  Denies any GI concerns, otherwise sleeping well.  He graduated from high school last year and has been at home with his parents.              Immunizations:           Immunization History   Administered Date(s) Administered    Influenza Vaccine >6 months (Alfuria,Fluzone) 12/12/2014    Pneumococcal 23 valent 12/12/2014              Allergies:    No Known Allergies          Medications:      Prescription Medications as of 9/6/2023           Rx Number Disp Refills Start End Last Dispensed Date Next Fill Date Owning Pharmacy     acetaminophen (TYLENOL) 325 MG tablet   1 Bottle 0 7/19/2019       [place], [place] - [address]     Sig: Take 2 tablets (650 mg) by mouth every 6 hours as needed for mild pain or fever     Class: No Print Out     Route: Oral     Ascorbic Acid (VITAMIN C PO)                     Sig: Take 3  tablets by mouth daily     Class: Historical     Route: Oral     CALCIUM ANTACID 500 MG chewable tablet     0 7/3/2019       [facility]     Si chew tab 2 times daily     Class: Historical     calcium carbonate 500 mg, elemental, (OSCAL) 500 MG tablet   120 tablet 5 2023       [facility]     Sig: Take 2 tablets (1,000 mg) by mouth 2 times daily     Class: E-Prescribe     Route: Oral     carvedilol (COREG) 25 MG tablet   60 tablet 11 2023       [facility]     Sig: Take 1 tablet (25 mg) by mouth 2 times daily (with meals)     Class: E-Prescribe     Route: Oral     Cholecalciferol (VITAMIN D3) 50 MCG (2000) CAPS   60 capsule 5 2020       [facility]     Sig: Take 2 capsules by mouth daily     Class: E-Prescribe     Route: Oral     enalapril (VASOTEC) 10 MG tablet   60 tablet 11 2023       [facility]     Sig: Take 1 tablet (10 mg) by mouth 2 times daily     Class: E-Prescribe     Route: Oral     eplerenone (INSPRA) 25 MG tablet   90 tablet 3 2023             Sig: Take 1 tablet (25 mg) by mouth daily     Class: Local Print     Route: Oral     FLUoxetine (PROZAC) 10 MG capsule               [facility]     Sig: Take 10 mg by mouth daily     Class: Historical     Route: Oral     omeprazole (PRILOSEC) 10 MG DR capsule   16 capsule 4 2023       [facility]     Sig: OPEN 1 CAPSULE AND SPRINKLE CONTENTS ON SPOONFUL OF FOOD ONCE DAILY ON FRIDAY, SATURDAY, AND MONDAY     Class: E-Prescribe     omeprazole (PRILOSEC) 10 MG DR capsule   16 capsule 3 2020       [facility] Thomas[address]     Sig: OPEN 1 CAPSULE AND SPRINKLE CONTENTS ON A SPOONFUL OF FOOD, ONCE DAILY, FRIDAY, SATURDAY,  "SUNDAY AND MONDAY.     Class: E-Prescribe     order for DME   1 Units 0 8/11/2017       [id number] - [place], [place]  [address]     Sig: Sling for Micah Lift.     Class: Local Print     predniSONE (DELTASONE) 20 MG tablet   40 tablet 5 7/12/2023       [id number] - [place], [place]  [address]     Sig: TAKE 5 TABLETS BY MOUTH TWICE WEEKLY     Class: E-Prescribe     Notes to Pharmacy: We are filling last refill today and the patient is requesting authorization to refill once that supply has been used. Thank you!     testosterone cypionate (DEPOTESTOSTERONE) 200 MG/ML injection   1 mL 5 3/18/2021       [id number] - [place], [place]  [address]     Sig: Inject 1 mL (200 mg) into the muscle every 28 days     Class: E-Prescribe     Notes to Pharmacy: Please provide 18 g needle for withdrawing medication and 23 gauge needle for administration. Please provide 3 mL syringe.     Route: Intramuscular                     Review of Systems:   The Review of Systems is negative other than noted in the HPI          Physical Exam:      /58 (BP Location: Right arm, Patient Position: Sitting, Cuff Size: Adult Large)   Pulse 75   Temp 97.3  F (36.3  C) (Oral)   Resp 18   Ht 1.7 m (5' 6.93\")   Wt 86.7 kg (191 lb 2.2 oz)   SpO2 98%   BMI 30.00 kg/m      Physical Exam:   General: NAD  Eyes: No conjunctival injection, no scleral icterus.  Mouth: No oral lesions, no erythema or exudate in the oropharynx  Respiratory: No increased work of breathing  Cardiovascular: No lower extremity edema  Extremities: Warm, dry  Neurologic:             Mental Status Exam: Alert, awake and easily engaged in interaction.             Cranial Nerves: PERRLA, EOMs intact, no nystagmus, facial movements symmetric,                 facial sensation intact to light touch, hearing intact to conversation, palate and uvula               rise symmetrically, no deviation in uvula or tongue, tongue midline and fully mobile     "            with no atrophy or fasciculations.              Motor:    Manual muscle testing: Mostly only antigravity with his proximal upper and lower extremities, and distal extremities are 2-3.  He is able to hold his neck up unsupported most of the time.             Sensory: Normal response to touch.              Coordination: No overt dysmetria seen.              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 Stable cardiomyopathy with normal cardiac function. Asymptomatic restrictive lung disease.     Given that patient has been stable over the past 1 year, we will continue with the current treatment plan of prednisone 100 mg twice weekly with vitamin D and calcium.     Recommendations:   - Continue prednisone 100 mg twice weekly  - Continue vitamin D and calcium  - Continue to follow up with cardiology  - Continue to follow up with ortho  - Return to clinic in 6 months or sooner if necessary  -He is in need for simple stuff works sleep system to prevent pressure injuries, prevent contracture worsening, and improve sleep hygiene as it is medically necessary.      Charles Chacon MD  Neuromuscular Medicine Fellow, PGY-5  [facility]       The longitudinal plan of care for the diagnosis as documented were addressed during this visit. Due to the added complexity in care, I will continue to support Christopher in the subsequent management and with ongoing continuity of care.  I personally examined this patient with the fellow Dr Chacon.  I have spent at least 20 min on the date of the encounter in chart review, patient visit, review of tests, counseling the patient, documentation about the issues documented above. Our recommendations were  discussed with the other providers and patient and/or family.         Tray Cavazos MD  Neurology and neuromuscular medicine

## 2024-02-23 NOTE — PROGRESS NOTES
PFT NOTE    Pt gave good effort & was cooperative, was able to meet ATS standards for acceptability and repeatability. Nothing was given for the bronchodilator.     Elizabeth Rodriges, RT

## 2024-02-23 NOTE — LETTER
2/23/2024      RE: Christopher Gorman  [address]  [facility] [place] [facility] [address]     Dear Colleague,    Thank you for the opportunity to participate in the care of your patient, Christopher Gorman, at the [facility] PEDIATRIC SPECIALTY CLINIC at [facility]. Please see a copy of my visit note below.                  Pediatric Neuromuscular Clinic      Christopher Gorman MRN# [id number]   YOB: 2005 Age: 17 year old      Date of Visit:  Sep 6, 2023     Primary care provider: Jose Finn     Refering physician:[unfilled]      History is obtained from the patient, family and medical record       Interval Change:      Christopher Gorman is a 18year 4month old male was seen and examined at the pediatric neuromuscular clinic on Sep 6, 2023 for a follow up evaluation of previously diagnosed Duchenne muscular dystrophy.   He is nonambulatory and uses a power wheelchair full-time for mobility.  He had a new power wheelchair last year in May 2023 that has a standing feature, and they use this about 2-3 times per day to stretch his legs.  They find this future quite helpful.  He is dependent for transfers and uses a lift.  He uses a chair for showering.  Currently is taking prednisone, vitamin D, Vitamin C, calcium, and heart medications.  Denies any side effects.  Denies any new shortness of breath.  Does have baseline neuromuscular weakness with breathing overnight and uses BiPAP consistently.  Denies any GI concerns, otherwise sleeping well.  He graduated from high school last year and has been at home with his parents.              Immunizations:           Immunization History   Administered Date(s) Administered    Influenza Vaccine >6 months (Alfuria,Fluzone) 12/12/2014    Pneumococcal 23 valent 12/12/2014              Allergies:    No Known Allergies          Medications:      Prescription Medications as of 9/6/2023           Rx Number Disp Refills  Start End Last Dispensed Date Next Fill Date Owning Pharmacy     acetaminophen (TYLENOL) 325 MG tablet   1 Bottle 0 2019       [facility] [address]     Sig: Take 2 tablets (650 mg) by mouth every 6 hours as needed for mild pain or fever     Class: No Print Out     Route: Oral     Ascorbic Acid (VITAMIN C PO)                     Sig: Take 3 tablets by mouth daily     Class: Historical     Route: Oral     CALCIUM ANTACID 500 MG chewable tablet     0 7/3/2019       [facility]     Si chew tab 2 times daily     Class: Historical     calcium carbonate 500 mg, elemental, (OSCAL) 500 MG tablet   120 tablet 5 2023       [facility]     Sig: Take 2 tablets (1,000 mg) by mouth 2 times daily     Class: E-Prescribe     Route: Oral     carvedilol (COREG) 25 MG tablet   60 tablet 11 2023       [facility]     Sig: Take 1 tablet (25 mg) by mouth 2 times daily (with meals)     Class: E-Prescribe     Route: Oral     Cholecalciferol (VITAMIN D3) 50 MCG ( UT) CAPS   60 capsule 5 2020       [facility]     Sig: Take 2 capsules by mouth daily     Class: E-Prescribe     Route: Oral     enalapril (VASOTEC) 10 MG tablet   60 tablet 11 2023       [facility]     Sig: Take 1 tablet (10 mg) by mouth 2 times daily     Class: E-Prescribe     Route: Oral     eplerenone (INSPRA) 25 MG tablet   90 tablet 3 2023             Sig: Take 1 tablet (25 mg) by mouth daily     Class: Local Print     Route: Oral     FLUoxetine (PROZAC) 10 MG capsule               [id number]  [place]     Sig: Take 10 mg by mouth daily     Class: Historical     Route: Oral     omeprazole (PRILOSEC) 10 MG DR capsule   16 capsule 4 2023       [facility]  [facility] [address]     Sig: OPEN 1 CAPSULE AND SPRINKLE CONTENTS ON SPOONFUL OF FOOD ONCE DAILY ON FRIDAY, SATURDAY,SUNDAY AND MONDAY     Class: E-Prescribe     omeprazole (PRILOSEC) 10 MG DR capsule   16 capsule 3 12/7/2020       [id number] [place] [address]     Sig: OPEN 1 CAPSULE AND SPRINKLE CONTENTS ON A SPOONFUL OF FOOD, ONCE DAILY, FRIDAY, SATURDAY, SUNDAY AND MONDAY.     Class: E-Prescribe     order for DME   1 Units 0 8/11/2017       [facility] [address]     Sig: Sling for Micah Lift.     Class: Local Print     predniSONE (DELTASONE) 20 MG tablet   40 tablet 5 7/12/2023       [facility] - [place], [place] [address]     Sig: TAKE 5 TABLETS BY MOUTH TWICE WEEKLY     Class: E-Prescribe     Notes to Pharmacy: We are filling last refill today and the patient is requesting authorization to refill once that supply has been used. Thank you!     testosterone cypionate (DEPOTESTOSTERONE) 200 MG/ML injection   1 mL 5 3/18/2021       [facility] [address]     Sig: Inject 1 mL (200 mg) into the muscle every 28 days     Class: E-Prescribe     Notes to Pharmacy: Please provide 18 g needle for withdrawing medication and 23 gauge needle for administration. Please provide 3 mL syringe.     Route: Intramuscular                     Review of Systems:   The Review of Systems is negative other than noted in the HPI          Physical Exam:      /58 (BP Location: Right arm, Patient Position: Sitting, Cuff Size: Adult Large)   Pulse 75   Temp 97.3  F (36.3  C) (Oral)   Resp 18   Ht 1.7 m (5' 6.93\")   Wt 86.7 kg (191 lb 2.2 oz)   SpO2 98%   BMI 30.00 kg/m      Physical Exam:   General: NAD  Eyes: No conjunctival injection, no scleral icterus.  Mouth: No oral lesions, no erythema or exudate in the oropharynx  Respiratory: No increased work of breathing  Cardiovascular: No lower extremity edema  Extremities: Warm, " dry  Neurologic:             Mental Status Exam: Alert, awake and easily engaged in interaction.             Cranial Nerves: PERRLA, EOMs intact, no nystagmus, facial movements symmetric,                 facial sensation intact to light touch, hearing intact to conversation, palate and uvula               rise symmetrically, no deviation in uvula or tongue, tongue midline and fully mobile                with no atrophy or fasciculations.              Motor:    Manual muscle testing: Mostly only antigravity with his proximal upper and lower extremities, and distal extremities are 2-3.  He is able to hold his neck up unsupported most of the time.             Sensory: Normal response to touch.              Coordination: No overt dysmetria seen.              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 Stable cardiomyopathy with normal cardiac function. Asymptomatic restrictive lung disease.     Given that patient has been stable over the past 1 year, we will continue with the current treatment plan of prednisone 100 mg twice weekly with vitamin D and calcium.     Recommendations:   - Continue prednisone 100 mg twice weekly  - Continue vitamin D and calcium  - Continue to follow up with cardiology  - Continue to follow up with ortho  - Return to clinic in 6 months or sooner if necessary      Charles Chacon MD  Neuromuscular Medicine Fellow, PGY-5  [facility]       The longitudinal plan of care for the diagnosis as documented were addressed during this visit. Due to the added complexity in care, I will continue to support Christopher in the subsequent management and with ongoing continuity of care.  I personally examined this patient with the  fellow Dr Chacon.  I have spent at least 20 min on the date of the encounter in chart review, patient visit, review of tests, counseling the patient, documentation about the issues documented above. Our recommendations were discussed with the other providers and patient and/or family.         Tray Cavazos MD  Neurology and neuromuscular medicine

## 2024-02-23 NOTE — PROGRESS NOTES
"OUTPATIENT PHYSICAL THERAPY CLINIC NOTE    Christopher Gorman                              YOB: 2005  [id number]     Type of visit:                                                                              Evaluation            Date of service: 2/23/2024      Referring provider: Dr Tray Cavazos      present: No  Language: English     Others present at visit:  Parent(s) - mother and father   Sibling - Holger (also has DMD)     Medical diagnosis:   Duchenne Muscular dystrophy (DMD)      Pertinent medical history: Christopher has returned to [facility] today for 6 mo follow up visit. Overall, things are going well, they have no new concerns. He continues to stretch and stand via use of PWC 3x/day. He needs to be reminded to stand and tilt for pressure relief. He is coming back to the [facility] for his wisdom teeth removal on 3/13/2024. They need a new sling for their ceiling and cheng lift because Christopher and Holger are currently sharing one sling and there are times they both need to transfer at the same time.      Cardio-respiratory status:  FVC 32% predicted     Height/Weight: 5' 7\" / 191 lbs     Living environment:  House - 3 stairs to enter (ramp); 14 steps inside the house (does not access); bedroom and bathroom on main level       Current assistance/living environment:  Lives with parents and brother.      Current mobility equipment:  PWC - Permobil F5 (spring 2023; [employer])               Ceiling lift - bedroom <> bathroom   Modified van       Current ADL equipment:  Fixed shower chair   Able to sit independently on the toilet - needs assist to wipe    Standard queen bed with memory foam topper (sleeps on L side and back; needs to be turned 2-4x/night)   No orthotics     Technology used: computer, phone     Patient concerns/goals: No concerns, no questions.     Evaluation              Interview completed.                Pain assessment: None                Range of motion: tested while " seated in [facility]                          Soleus  -10 degrees B                           Knee extension short by 50 deg B, R LE slightly tighter than L                            Manual muscle testing:    Joint/body area Motion Left Right   Neck Flexion 3- --    Shoulder Flexion 2 2      Abduction 2 2   Elbow Flexion 3- 2+      Extension 2 2   Wrist Flexion 4- 4-    Extension 4- 4-   Hip Flexion 1 1   Knee Flexion 2 2      Extension 2- 2-   Ankle Dorsiflexion 2 2      Plantarflexion 3- 3-                            Gait:  Non ambulatory since Fall 2016                          Cognition:  WFL - not formally tested.    Juliana Upper Extremity Rating Scale: 4 - with compensation  Starting with arms at the sides, the patient can abduct the arms in a full [place] until they touch above the head.   Can raise arms above head only by flexing the elbow (shortening the circumference of the movement) or using accessory muscles.  Cannot raise hands above head, but can raise an 8-oz glass of water to the mouth.  Can raise hands to the mouth, but cannot raise an 8-oz glass of water to the mouth.  Cannot raise hands to the mouth, but can use hands to hold a pen or  pennies from the table.  Cannot raise hands to the mouth and has no useful function of hands.    Vignos Functional Rating Scale for Muscular Dystrophy: 9  1. Walks and climbs stairs without assistance  2. Walks and climbs stairs with aid of railing  3. Walks and climbs stairs slowly with aid of railing [over 12 seconds for four standard stairs]  4. Walks unassisted and raises from chair but cannot climb stairs  5. Walks unassisted but cannot rise from chair or climb stairs  6. Walks only with assistance or walks independently with long leg braces  7. Walks in long leg braces but requires assistance for balance  8. Stands in long leg braces but is unable to walk even with assistance  9. Is in wheelchair  10. Is confined to bed    Egen Klassifikation Scale Version  2    Item Score   1. Ability to use wheelchair 2   2. Ability to transfer from wheelchair 2   3. Ability to stand 2   4. Ability to balance in the wheelchair 2   5. Ability to move the arms 1   6. Ability to use the hands and arms for eating 0   7. Ability to turn in bed 3   8. Ability to cough 0   9. Ability to speak 0   10. Physical well-being (respiratory insufficiency) 0   11. Daytime fatigue 0   12. Head control 0   13. Ability to control joystick 0   14. Food textures 1   15. Eating a meal 1   16. Swallowing 0   17. Hand function 1    TOTAL SCORE 15 / 51     Notes:   - A higher score reflects a lower level of functional ability  - Items on the scale are scored according to the best an individual has done in the last two weeks, especially if there is variation between good days and bad days.     References:   Jessica LEONARDO, Valorie RACHEL, Violeta S, Сергей CLINTON. Validity of the EK scale: a functional assessment of non-ambulatory individuals with Duchenne muscular dystrophy or spinal muscular atrophy. Physiotherapy Research International, 63 119-134, 2001.  Jessica PEREYRA, Valorie RACHEL, Antonio BURNS, Сергей CLINTON. Reliability of the EK Scale, a Functional Test for Non-ambulatory Persons with Duchenne Dystrophy. Advances in Physiotherapy 2002; 4:37-47.  Jessica PEREYRA, Camden QUINONES, Alvino QUINONES, et al. Egen Classification revisited in SMA. Neromuscul Disord 2008; 18: 740-41.      Fall Risk Screen:   Has the patient fallen 2 or more times in the last year? No              Has the patient fallen and had an injury in the past year? No              Timed Up and Go Score: Not able to perform due to non ambulatory   Is the patient a fall risk? Yes, department fall risk interventions implemented          Impairments:  Fatigue  Muscle atrophy  Balance  Range of motion  Skin integrity      Treatment diagnosis:  Impaired mobility  Impaired activities of daily living     Clinical Presentation: Evolving/Changing  Clinical Presentation Rationale:  Progressive nature of DMD.  Clinical Decision Making (Complexity): Low complexity      Recommendations/Plan of care:  Patient would benefit from interventions to enhance safety and independence.  Rehab potential good for stated goals.              Evaluation only    Equipment:     Micah sling (large, mesh, full head support)     SSW sleep system     Goals:    Target date: 2/23/2024   Patient, family and/or caregiver will verbalize understanding of evaluation results and implications for functional performance.  Patient, family and/or caregiver will verbalize/demonstrate understanding of home program.      Educational assessment/barriers to learning:   No barriers noted                Treatment provided this date:               Discussed results of evaluation with regard to impairments and functional performance and compared them to previous visit.   HEP Education: Encouraged daily standing (3x/day or more), increased frequency of tilt for pressure reduction, compliance with daily stretching and deep breathing.   Educated regarding SSW sleep system. Will reach out to rep to set up trial.      Response to treatment/recommendations:  Parents and pt demo understanding.     Goal attainment:  All goals met                Risks and benefits of evaluation/treatment have been explained.  Patient, family and/or caregiver are in agreement with Plan of Care.                Timed Code Treatment Minutes: 0  Total Treatment Time (sum of timed and untimed services): 20     Signature:   Adeline Smith, PT, DPT  Physical Therapist  [facility]  [address], [facility] [address] [address]  Phone: [phone]  Fax: [phone]  Email: [email or web address]       Date: 2/23/2024      Certification [facility], Medicaid:  Onset date: 2/23/2024   Start of care date: 2/23/2024   Certification date from 2/23/2024 to 2/23/2024      I CERTIFY THE NEED FOR  THESE SERVICES FURNISHED UNDER                   THIS PLAN OF TREATMENT AND WHILE UNDER MY CARE     (Physician co-signature of this document indicates review and certification of the therapy plan).

## 2024-02-24 NOTE — NURSING NOTE
Authorization to Share Protected Health Information signed by patient to communicate with Mere Bonifacios (Mother) [phone] and Yogesh Gorman (Father) [phone] for scheduling information, medical information, billing information, and pick-up items.

## 2024-02-25 NOTE — PROGRESS NOTES
Pediatrics Pulmonary - Provider Note  General Pulmonary - Return Visit    Patient: Christopher Gorman MRN# [id number]   Encounter: 2024 : 2005      I saw Christopher at the [facility] for a routine MDA visit accompanied by his Mother and father.    Subjective:   HPI: Last visit was on 23.    Juanito is a 18-year-old male patient with history of Duchenne muscular dystrophy, restrictive lung disease and nocturnal hypoventilation as demonstrated on sleep study completed in 2019 when he was found to have an RDI of 9.5 and sleep fragmentation.      Since last visit he had no respiratory infections. He has not needed any steroids or antibiotics. He denies cough, or shortness of breath.   He is continue using his AVAPS every night    He denies issues with mask, or pressure tolerance. Reports getting better sleep with use of AVAP    Per vent download, his compliance was 100%  Settings: AVAPS; EPAP 6, IPAP 10-25, R 8, and Tv 550ml.  He reports that the vent will occasionally give him a breath before he wants it.  He triggers 62% of his breaths. Avg RR 13.6 with lowest being 10.      Current bedtime is at 9-930 PM on weekdays/weekends.  Sleep latency is usually 30 minutes because he is on his phone.  He wakes up on avg 3 times per night to be repositioned. Wakes up at 830-9 AM on weekdays/weekends. He does not take naps, no reported excessive daytime sleepiness.      He is using breath stacking 3 times per day.    He had a sleep study done on 10/2/2022   Residual AHI 0.5 events per hour, however no REM supine on the final pressure. Loud snoring, normal respiratory rate and pattern. No sleep associated hypoxemia or hypoventilation.Continue iVAPS 16/4.8/5/4/20/1.5/0.8/300 cmH2O    Severely elevated periodic limb movement associated with arousals.    Allergies  Allergies as of 2024    (No Known Allergies)     Current Outpatient Medications   Medication Sig Dispense Refill    acetaminophen  "(TYLENOL) 325 MG tablet Take 2 tablets (650 mg) by mouth every 6 hours as needed for mild pain or fever 1 Bottle 0    Ascorbic Acid (VITAMIN C PO) Take 3 tablets by mouth daily      CALCIUM ANTACID 500 MG chewable tablet 1 chew tab 2 times daily  0    calcium carbonate 500 mg, elemental, (OSCAL) 500 MG tablet Take 2 tablets (1,000 mg) by mouth 2 times daily 120 tablet 5    carvedilol (COREG) 25 MG tablet Take 1 tablet (25 mg) by mouth 2 times daily (with meals) 60 tablet 11    Cholecalciferol (VITAMIN D3) 50 MCG (2000 UT) CAPS Take 2 capsules by mouth daily 60 capsule 5    enalapril (VASOTEC) 10 MG tablet Take 1 tablet (10 mg) by mouth 2 times daily 60 tablet 11    eplerenone (INSPRA) 25 MG tablet Take 1 tablet (25 mg) by mouth daily 90 tablet 3    FLUoxetine (PROZAC) 10 MG capsule Take 10 mg by mouth daily      order for DME Sling for Micah Lift. 1 Units 0    predniSONE (DELTASONE) 20 MG tablet TAKE 5 TABLETS BY MOUTH TWICE WEEKLY 40 tablet 5       Past medical history, surgical history and family history reviewed with patient/parent today, no changes.      RoS  A comprehensive review of systems was performed and is negative except as noted in the HPI.      Objective:     Physical Exam  /58 (BP Location: Right arm, Patient Position: Chair, Cuff Size: Adult Large)   Pulse 75   Temp 97.3  F (36.3  C) (Oral)   Resp 18   Ht 5' 6.93\" (170 cm)   Wt 191 lb 2.2 oz (86.7 kg)   SpO2 98%   BMI 30.00 kg/m    Ht Readings from Last 2 Encounters:   02/23/24 5' 6.93\" (170 cm) (17%, Z= -0.94)*   02/23/24 5' 6.93\" (170 cm) (17%, Z= -0.94)*     * Growth percentiles are based on CDC (Boys, 2-20 Years) data.     Wt Readings from Last 2 Encounters:   02/23/24 191 lb 2.2 oz (86.7 kg) (89%, Z= 1.22)*   02/23/24 191 lb 2.2 oz (86.7 kg) (89%, Z= 1.22)*     * Growth percentiles are based on CDC (Boys, 2-20 Years) data.     BMI %: > 36 months -  95 %ile (Z= 1.64) based on CDC (Boys, 2-20 Years) BMI-for-age based on BMI available " as of 2/23/2024.    Constitutional:  No distress, comfortable, pleasant. Sitting in motorized wheelchair in no acute distress  Vital signs:  Reviewed and normal.  Eyes:  Anicteric, normal extra-ocular movements.  Ears, Nose and Throat:   No rhinorrhea.  Good palatal elevation.  Throat was clear..  Neck:  Supple with full range of motion, no lymphadenopathy.  Cardiovascular:   Normal S1 and S2.  No gallop or murmurs.  Chest:  Symmetrical, no retractions.  Respiratory: Moderately reduced breath sound amplitude particularly posteriorly at lung bases.  Louder breath sounds anteriorly, but no adventitious sounds heard anywhere.  Gastrointestinal:  Positive bowel sounds, no hepatosplenomegaly, no masses,   Musculoskeletal: No digital clubbing.  Skin: Mild facial acne.  Neurological:  Wheelchair-bound with generalized hypotonia.      Spirometry performed today in the office setting.   The results of this test does not meet the ATS standards for acceptability and repeatability due to end of test criteria not being met.    Results reported in percent predicted or ratio. FVC was 32, FEV1 was 17 and DKC37-57% was 7. FEV1/FVC was 47. Expiratory flow volume loop was restricted.     MEP +38 cmH20 (last time +44)  MIP -35 cmH20 (last time -42)   L/min (last time 260)  ETCO2 36 mmHg (last time 42)    Spirometry Interpretation:  Severe restriction with minimal decrease in lung function since last PFT.  Mild decrease in cough strength  No significant change in MIP/MEP or PCF      Assessment     Juanito is a 18year-old male with history of Duchenne muscular dystrophy with severe restrictive pattern pulmonary function test which has been slightly decreased from last as well as nocturnal hypoventilation. His ventilator is occasionally giving him a breath before Christopher would like it. With him only triggering 62% of breaths, we will decrease the RR from 10 to 8 to allow for Christopher to drive his respiration initiation. Lung function is  minimally decreased from last testing likely from underlying diagnosis. Will continue to trend lung function at every visit.      Plan:     1. Restrictive lung disease due to muscular dystrophy (H)  2. VIVIANE (obstructive sleep apnea)  - Decrease NIV rate from 10 to 8.  - Continue breath stacking techniques 3 times per day.  - Follow up in 6 months.      Ismael Mckeon DO, PGY-5  [facility]  Pediatric Pulmonology Fellow      CC  Jose Finn    Copy to patient  MIRNA RUTLEDGE TRAVIS  [address]  [facility] [place] [address]

## 2024-02-26 LAB — TROPONIN I SERPL HS-MCNC: 3 NG/L

## 2024-02-27 ENCOUNTER — TELEPHONE (OUTPATIENT)
Dept: ORTHOPEDICS | Facility: CLINIC | Age: 19
End: 2024-02-27
Payer: COMMERCIAL

## 2024-02-27 LAB — TESTOST SERPL-MCNC: 200 NG/DL (ref 240–950)

## 2024-02-27 NOTE — PROGRESS NOTES
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 He was seen for a genetic counseling appointment in coordination with his other care team providers today. He was accompanied by his parents and his brother.     Please refer to Dr. Cavazos, Dr. Murphy, and Adeline Smith's note from today for further details of Christopher's medical history and evaluation from today.     Family History: A three generation pedigree was obtained and scanned into the electronic medical record. The relevant portions are described below:    Siblings-   Brother, Holger, is 18 years old and has DMD due to exon 55 deletion.   Parents-   Mother, Mere, is 46 years old and is a carrier for the DMD variant (she reports she had testing long ago). She has not had cardiac screening so a referral was placed for her today. Mere also has a history of Grave's disease and hypothyroidism.   Father, Yogesh, is 46 years old and is healthy.   Maternal Relatives-   One maternal uncle passed away at age 16 due to DMD  One maternal aunt, age 50, who is a known carrier for DMD. She has a 14 year old son with DMD. She also has two daughters.   One maternal aunt, age 44, who is healthy as are her children.   One maternal uncle age 54 who is healthy as are his children.   One maternal uncle age 52 who is healthy as are his children.   Maternal grandfather is 79 years old and has a history of high blood pressure and enlarged heart.   Maternal grandmother was an obligate DMD carrier. She passed away at age 73 due to complications from diabetes but she did have a \"heart condition\" not further specified.   She had a brother who passed away from DMD. She had two sisters who were both carriers and one of whom had a daughter who was reportedly affected " with muscular dystrophy.  Paternal Relatives-   Three paternal uncles and one paternal aunt who are in their 30-40's and are all generally well. The uncles all have children who are reported to be healthy.   Paternal grandparents are in their 70's and are healthy.    Family history is otherwise largely non-contributory. Consanguinity was denied.     Genetic Counseling Discussion:  Our bodies are made of cells that contain our chromosomes. We have 22 identical pairs of chromosomes numbered 1-22 and one pair of sex chromosomes called X and Y. Females have two X chromosomes and males have one X and one Y chromosome. Our chromosomes are made up of long stretches of DNA containing our genes. Our genes serve as the instructions for our bodies to grow and function. Our genes are divided into different numbered subunits called exons.     Dystrophinopathies refer to a group of neuromuscular conditions caused by changes (mutations) in dystrophin gene (DMD gene), which is located on the X chromosome.  There have been over 5000 different mutations described in the DMD gene. About 65-80% of mutations in the DMD gene are small deletions or duplications of exons in DMD. The remaining 20-35% of mutations are sequence changes/ point mutations. The dystrophin protein is essential for maintaining muscle strength.  If the dystrophin protein is not made properly or is missing, it causes muscle to breakdown at a rapid rate that causes muscle weakness.  Mutations in the dystrophin gene can also cause cardiomyopathy (a weakening of the heart muscles), scoliosis, respiratory problems, behavioral problems and learning disabilities. For this reason it is important for people with dystrophinopathies to have annual or biannual evaluations with a multidisciplinary care team that includes a Neurologist, Cardiologist, Pulmonologist, Physical Therapist, and sometimes an Orthopedist.     The dystrophinopathies cover a spectrum of muscle diseases  "that are called Duchenne muscular dystrophy (DMD), Treadwell muscular dystrophy (BMD), DMD-associated dilated cardiomyopathy (DCM). DMD presents at younger ages and has a faster progression of muscle weakness with affected boys needing a wheelchair usually by age 12, while BMD tends to present later in childhood and has a slower progression of muscle weakness. Individuals with a dystrophinopathy are typically classified as having DMD or BMD based on their age of onset of muscle weakness, progression of their muscle weakness, and sometimes based on their specific mutation in the DMD gene.     To elaborate on the genetics of dystrophinopathies further, our cells \"read\" our DNA code and exons to create the proteins our bodies need. They read these DNA codes in different segments which are called reading frames. Some deletions and duplications in the DMD gene disrupt, or cut off, a reading frame in the gene. This is referred to as \"out of frame\" and this typically causes a dysfunctional dystrophin protein to be made. Other deletions/duplications in the DMD gene maintain the reading frame so the cell can still read the gene's code, but it makes a shorter dystrophin protein than usual. This is referred to as \"in-frame.\" Generally, DMD deletions/duplications that are \"in-frame\" cause milder disease (usually BMD). About 90% of deletions/duplications in the DMD gene follow this rule, so it can be a helpful predictor but it not always accurate in predicting disease severity.     Christopher's  mutation in the DMD gene is a deletion of exon 55.    The dystrophinopathies are inherited in an X-linked inheritance pattern. As previously mentioned, the DMD gene is on the X chromosome and because males only have one X chromosome, if they have a mutation in their DMD gene they will be affected. Males can inherit their DMD mutation from their mother or their mutation could be a new change in them for the first time (de krupa). Approximately 1/3 " of boys with DMD/BMD have the condition as the result of a new mutation. Females who carry one DMD mutation are considered carriers. If a woman is a carrier for DMD/BMD, there is a 50% chance she will have an affected son and a 50% chance she will have a carrier daughter with each pregnancy. Males with severe forms of dystrophinopathies typically do not reproduce. Males with a milder dystrophinopathy would not pass their DMD mutation to any of their sons and they would pass their mutation to all of their daughters who would be carriers.    We discussed that a small portion of female carriers can present with mild muscle weakness, cramps, and fatigue in adulthood but most female carriers (80-90%) remain asymptomatic. Carrier females are also at increased at risk of developing cardiomyopathy in adulthood. For this reason, cardiac evaluation and screening is recommended for women who are carriers for DMD/BMD. There are no specific guidelines about the age at which female carriers should start cardiac screening, but generally, it is thought that females should start cardiac screening in their late teens/early adulthood, and if normal, repeat every 3-5 years.     As previously mentioned, it is important for males with a dystrophinopathy to be followed by a multidisciplinary team to treat symptoms and ensure they are getting the therapies they need. Males with DMD and BMD are typically given steroid treatment (prednisone, deflazacort, or AGAMREE (vamorolone) which was FDA approved in 2023) to help slow skeletal muscle damage and weakness. Many may also be started on an ACE inhibitor (or an angiotensin II-receptor blockers (ARBs) such as losartan) and/or a beta blocker to try to help improve left ventricular function of the heart.     While there is currently no cure for DMD/BMD there are several treatments that target the underlying genetic mechanism in DMD that are in clinical trial phases and some that have been FDA  approved. These treatments have limitations based on the causative DMD variant:  Four treatments that have been FDA approved target exon skipping (Eteplirsen/Exondys 51, Casimersen/Amondys 45, Golodirsen/Vyondys 53, and Viltepso). By skipping the nonfunctional exons (using an antisense oligonucleotide), a shortened dystrophin protein can be produced which may have some functionality.    Ataluren is an oral small molecule treatment that allows for stop-codon read-through to produce dystrophin in patients with nonsense mutations. This drug has been in trials since 2014 and the company that makes this drug is still seeking FDA approval.   In June 2023 FDA approved ELEVIDYS which is an adeno-associated virus (AAV) vector-based gene therapy. This treatment is only FDA approved for ambulatory pediatric patients aged 4 through 5 years with Duchenne muscular dystrophy. ELEVIDYS is contraindicated in patients with any deletion in exon 8 and/or exon 9 in the DMD gene  There are several other companies with active clinical trials for other gene therapies and other targeted treatments for DMD/BMD    Christopher was given a handout with diagrams explaining genetics, the DMD gene, his DMD mutation, and inheritance pattern, including risks to his future children.     It was a pleasure meeting Holger White and their parents today. They were encouraged to reach out to me if they have any further questions.     Plan:  Handout explaining the genetic of DMD was given to Christopher  A Cardiology referral was placed for his mother Mere  The family can reach out to me anytime with additional questions      Attestation: Patient seen, evaluated and discussed with the Genetic Counseling Intern, Rochelle Barksdale. I have verified the content of the note, which accurately reflects my assessment of the patient and the plan of care.    Desiree Minaya MS, [facility]  Licensed Genetic Counselor  [facility]  Phone:  [phone]  Fax: [phone]      Time spent in consultation face to face was approximately 20 minutes.

## 2024-02-28 LAB
DEPRECATED CALCIDIOL+CALCIFEROL SERPL-MC: <50 UG/L (ref 20–75)
VITAMIN D2 SERPL-MCNC: <5 UG/L
VITAMIN D3 SERPL-MCNC: 45 UG/L

## 2024-03-05 LAB
ATRIAL RATE - MUSE: 74 BPM
DIASTOLIC BLOOD PRESSURE - MUSE: NORMAL MMHG
INTERPRETATION ECG - MUSE: NORMAL
P AXIS - MUSE: 52 DEGREES
PR INTERVAL - MUSE: 138 MS
QRS DURATION - MUSE: 88 MS
QT - MUSE: 386 MS
QTC - MUSE: 428 MS
R AXIS - MUSE: 41 DEGREES
SYSTOLIC BLOOD PRESSURE - MUSE: NORMAL MMHG
T AXIS - MUSE: 34 DEGREES
VENTRICULAR RATE- MUSE: 74 BPM

## 2024-03-08 ENCOUNTER — VIRTUAL VISIT (OUTPATIENT)
Dept: PEDIATRIC NEUROLOGY | Facility: CLINIC | Age: 19
End: 2024-03-08
Attending: PEDIATRICS
Payer: COMMERCIAL

## 2024-03-08 DIAGNOSIS — I43 CARDIOMYOPATHY IN DUCHENNE MUSCULAR DYSTROPHY (H): Primary | ICD-10-CM

## 2024-03-08 DIAGNOSIS — G71.01 CARDIOMYOPATHY IN DUCHENNE MUSCULAR DYSTROPHY (H): Primary | ICD-10-CM

## 2024-03-08 PROCEDURE — 99214 OFFICE O/P EST MOD 30 MIN: CPT | Mod: 95 | Performed by: PEDIATRICS

## 2024-03-08 NOTE — PROGRESS NOTES
[facility]  Pediatric Cardiology  Visit Note    2024    RE: Christopher Gorman  : 2005  MRN: [id number]    Dear Dr. Finn,    I had the pleasure of evaluating Christopher Gorman in the [facility] on 3/8/2024 for routine follow-up evaluation. He presented to clinic via an [employer] virtual visit with his father, who served as an independent historian. As you remember, Christopher is a 18 year old male with Duchenne muscular dystrophy. He was followed by my colleague, Dr. Selene Campos, for several years. He has had no evidence of cardiac dysfunction; however, enalapril was started prophylactically for Duchenne-related cardiomyopathy in 2016. In 2019, he continued to have resting tachycardia, which is most likely secondary to Duchenne-related autonomic dysfunction, so carvedilol was started and titrated. He has a history of non-cardiac chest pain that has not been associated with concerning cardiac symptoms. He has a history of dependent edema of his lower extremities. From a pulmonary perspective, he has used AVAPS at night for obstructive sleep apnea and restrictive lung disease. He has tolerated this well. From a neurologic perspective, he has been on prednisone for myopathy, is non-ambulatory and is dependent on a power chair.     Since his last visit with me in 2023, he has done well. He denies chest pain, palpitations, dizziness/lightheadedness, syncope or shortness of breath. He generally stays well-hydrated. He reports taking his cardiac medication, as directed.    A comprehensive review of systems was performed and is negative except as noted in the HPI.    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    Social History  Lives with parents and younger brother in [place], [place].    Medications  acetaminophen (TYLENOL) 325 MG tablet, Take 2 tablets (650 mg) by mouth every 6 hours as needed for mild pain or fever  Ascorbic Acid (VITAMIN C PO), Take 3 tablets by mouth daily  CALCIUM ANTACID 500 MG chewable tablet, 1 chew tab 2 times daily  calcium carbonate 500 mg, elemental, (OSCAL) 500 MG tablet, Take 2 tablets (1,000 mg) by mouth 2 times daily  carvedilol (COREG) 25 MG tablet, Take 1 tablet (25 mg) by mouth 2 times daily (with meals)  Cholecalciferol (VITAMIN D3) 50 MCG (2000 UT) CAPS, Take 2 capsules by mouth daily  enalapril (VASOTEC) 10 MG tablet, Take 1 tablet (10 mg) by mouth 2 times daily  eplerenone (INSPRA) 25 MG tablet, Take 1 tablet (25 mg) by mouth daily  FLUoxetine (PROZAC) 10 MG capsule, Take 10 mg by mouth daily  order for DME, Sling for [employer] [employer].  predniSONE (DELTASONE) 20 MG tablet, TAKE 5 TABLETS BY MOUTH TWICE WEEKLY    No current facility-administered medications on file prior to visit.    Allergies  No Known Allergies    Laboratory Studies:  Imaging-  Echo (2/23/2024): Technically difficult study due to poor acoustic windows. Normal right and left ventricular size and systolic function. The calculated single biplane left ventricular ejection fraction is 55%. Normal right ventricular systolic pressure. No pericardial effusion.    Echo (9/8/2023): Technically difficult study due to poor acoustic windows. Normal right and left ventricular size and systolic function. The calculated single biplane left ventricular ejection fraction is 63%. Normal right ventricular systolic pressure. No pericardial effusion.    Cardiac MR (9/9/2021):   1. Normal left ventricular function.  2. Gadolinium sequences not obtained because of patient inability to hold his breath for long enough to perform these series.    Cardiac MR (1/13/2020): normal cardiac MRI; no evidence of fibrosis; LVEF  56%    Electrophysiology-  EKG (2/23/2024): sinus rhythm, possible right ventricular hypertrophy, anterior T-wave inversion    EKG (9/8/2023): sinus rhythm    Holter (8/16-8/18/2022): Sinus rhythm. HR range , average HR 79 bpm. No pauses or blocks. No tachyarrhythmias. Rare (<0.01%) PACs. No ventricular ectopy.    ZioPatch (1/22-1/24/2021): Screening for arrhythmias. Normal diurnal variation. Sinus rhythm predominates with HR , average 87 bpm. No significant pauses or blocks. Rare (<1%) premature atrial contractions. No ventricular ectopy. Patient triggered events totaling one time revealing sinus rhythm. No symptoms reported. Normal 4 day recording.    Labs-   Latest Reference Range & Units 02/23/24 16:14   Sodium 135 - 145 mmol/L 138   Potassium 3.4 - 5.3 mmol/L 4.1   Chloride 98 - 107 mmol/L 100   Carbon Dioxide (CO2) 22 - 29 mmol/L 28   Urea Nitrogen 6.0 - 20.0 mg/dL 10.1   Creatinine 0.67 - 1.17 mg/dL 0.35 (L)   GFR Estimate >60 mL/min/1.73m2 >90   Cystatin C 0.6 - 1.0 mg/L 1.0   GFR Calculated with Cystatin C >=60 mL/min/1.73m2 >90   Calcium 8.6 - 10.0 mg/dL 8.9   Anion Gap 7 - 15 mmol/L 10   Phosphorus 2.5 - 4.5 mg/dL 4.0   Albumin 3.5 - 5.2 g/dL 4.1   Protein Total 6.4 - 8.3 g/dL 6.5   Alkaline Phosphatase 65 - 260 U/L 81   ALT 0 - 50 U/L 55 (H)   AST 0 - 35 U/L 69 (H)   25 OH Vit D total 20 - 75 ug/L <50   25 OH Vit D2 ug/L <5   25 OH Vit D3 ug/L 45   Bilirubin Total <=1.2 mg/dL 0.8   FSH 1.5 - 12.4 mIU/mL 2.5   Glucose 70 - 99 mg/dL 78   Luteinizing Hormone 1.7 - 8.6 mIU/mL 3.1   N-Terminal Pro Bnp 0 - 450 pg/mL 58   T4 Free 1.00 - 1.60 ng/dL 1.80 (H)   Testosterone Total 240 - 950 ng/dL 200 (L)   Troponin I, High Sensitivity <=20 ng/L 3   TSH 0.50 - 4.30 uIU/mL 1.28   WBC 4.0 - 11.0 10e3/uL 9.5   Hemoglobin 13.3 - 17.7 g/dL 14.2   Hematocrit 40.0 - 53.0 % 44.2   Platelet Count 150 - 450 10e3/uL 315   RBC Count 4.40 - 5.90 10e6/uL 5.14   MCV 78 - 100 fL 86   MCH 26.5 - 33.0 pg 27.6   MCHC 31.5  - 36.5 g/dL 32.1   RDW 10.0 - 15.0 % 14.3   % Neutrophils % 58   % Lymphocytes % 30   % Monocytes % 7   % Eosinophils % 3   % Basophils % 1   Absolute Basophils 0.0 - 0.2 10e3/uL 0.1   Absolute Eosinophils 0.0 - 0.7 10e3/uL 0.2   Absolute Immature Granulocytes <=0.4 10e3/uL 0.1   Absolute Lymphocytes 0.8 - 5.3 10e3/uL 2.9   Absolute Monocytes 0.0 - 1.3 10e3/uL 0.7   % Immature Granulocytes % 1   Absolute Neutrophils 1.6 - 8.3 10e3/uL 5.7   Absolute NRBCs 10e3/uL 0.0   NRBCs per 100 WBC <1 /100 0   Parathyroid Hormone Intact 15 - 65 pg/mL 33   (L): Data is abnormally low  (H): Data is abnormally high    Assessment:  Patient Active Problem List   Diagnosis    Low bone mineral density    Vitamin D deficiency    Goiter - mild    Duchenne muscular dystrophy (H) deletion of exon 55    Sinus tachycardia    Hypocalcemia    VIVIANE (obstructive sleep apnea)    Restrictive lung disease due to muscular dystrophy (H)    Other osteoporosis without current pathological fracture    Delayed puberty    Cardiomyopathy in Duchenne muscular dystrophy (H)       Christopher is a 18 year old male with Duchenne muscular dystrophy and restrictive lung disease and obstructive sleep apnea now requiring AVAPS use during sleep who continues to have normal cardiac function on prophylactic carvedilol, enalapril and eplerenone. There is an emerging body of evidence that a prophylactic heart failure medication can prevent Duchenne-related cardiomyopathy. He has had sinus tachycardia, which is consistent Duchenne-related autonomic dysfunction that may be detrimental to heart function over time. Carvedilol appears to be helping with this.    In September 2023, he had an initially very low right arm blood pressure that was normal on recheck. His brachial pulse was not easily palpated there and was quite positional; however, there were no signs or symptoms of claudication.    Plan:  - continue the following medication:  enalapril 10 mg PO BID for delaying  progression of Duchenne cardiomyopathy  carvedilol 25 mg PO BID for tachycardia from Duchenne-related autonomic dysfunction for delaying progression of Duchenne cardiomyopathy  eplerenone 50 mg PO daily  - will forgo Holter monitor due to adhesive allergy  - will forgo cardiac MRIs due to difficulty performing breath-holds  - CMP, cystatin-C, NT-pro BNP and troponin I every Winter  - BPs should be obtained from the left arm preferentially    Activity Restriction: none  SBE prophylaxis: NOT indicated    Follow-up: in 6 months for clinic visit with EKG and echocardiogram    Thank you for allowing me to participate in Christopher's care. Please contact me with questions or concerns.    Sincerely,    Peter Bright MD    Division of Pediatric Cardiology  Department of Pediatrics  [facility]    CC:   Patient Care Team:  Jose Finn as PCP - General (Family Medicine)  Tray Cavazos MD as MD (Pediatric Neurology)  Selene Campos MD as MD (Pediatric Cardiology)  Jose Finn (Family Practice)  Melanie Delatorre MD as MD (INTERNAL MEDICINE - ENDOCRINOLOGY, DIABETES & METABOLISM)  Courtney Martinez MD as MD (Pediatric Pulmonology)  Tray Cavazos MD as Assigned Neuroscience Provider  Rachel Romero, RN as Registered Nurse  Deangelo, Peter Romero MD as Assigned Pediatric Specialist Provider     Virtual visit was combined with his brother, Holger's visit  Virtual visit start time: 1602 hrs  Virtual visit end time: 1606 hrs  Total time: 4 min + 15 minutes for chart review and documentation

## 2024-03-08 NOTE — LETTER
3/8/2024      RE: Christopher Gorman  [address]  [facility] [address]     Dear Colleague,    Thank you for the opportunity to participate in the care of your patient, Christopher Gorman, at the [facility] at [facility]. Please see a copy of my visit note below.      [facility]  Pediatric Cardiology  Visit Note    2024    RE: Christopher Gorman  : 2005  MRN: [id number]    Dear Dr. Finn,    I had the pleasure of evaluating Christopher Gorman in the [facility] on 3/8/2024 for routine follow-up evaluation. He presented to clinic via an [employer] virtual visit with his father, who served as an independent historian. As you remember, Christopher is a 18 year old male with Duchenne muscular dystrophy. He was followed by my colleague, Dr. Selene Campos, for several years. He has had no evidence of cardiac dysfunction; however, enalapril was started prophylactically for Duchenne-related cardiomyopathy in 2016. In 2019, he continued to have resting tachycardia, which is most likely secondary to Duchenne-related autonomic dysfunction, so carvedilol was started and titrated. He has a history of non-cardiac chest pain that has not been associated with concerning cardiac symptoms. He has a history of dependent edema of his lower extremities. From a pulmonary perspective, he has used AVAPS at night for obstructive sleep apnea and restrictive lung disease. He has tolerated this well. From a neurologic perspective, he has been on prednisone for myopathy, is non-ambulatory and is dependent on a power chair.     Since his last visit with me in 2023, he has done well. He denies chest pain, palpitations, dizziness/lightheadedness, syncope or shortness of breath. He generally stays  well-hydrated. He reports taking his cardiac medication, as directed.    A comprehensive review of systems was performed and is negative except as noted in the HPI.    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    Social History  Lives with parents and younger brother in [place], [place].    Medications  acetaminophen (TYLENOL) 325 MG tablet, Take 2 tablets (650 mg) by mouth every 6 hours as needed for mild pain or fever  Ascorbic Acid (VITAMIN C PO), Take 3 tablets by mouth daily  CALCIUM ANTACID 500 MG chewable tablet, 1 chew tab 2 times daily  calcium carbonate 500 mg, elemental, (OSCAL) 500 MG tablet, Take 2 tablets (1,000 mg) by mouth 2 times daily  carvedilol (COREG) 25 MG tablet, Take 1 tablet (25 mg) by mouth 2 times daily (with meals)  Cholecalciferol (VITAMIN D3) 50 MCG (2000 UT) CAPS, Take 2 capsules by mouth daily  enalapril (VASOTEC) 10 MG tablet, Take 1 tablet (10 mg) by mouth 2 times daily  eplerenone (INSPRA) 25 MG tablet, Take 1 tablet (25 mg) by mouth daily  FLUoxetine (PROZAC) 10 MG capsule, Take 10 mg by mouth daily  order for DME, Sling for [employer].  predniSONE (DELTASONE) 20 MG tablet, TAKE 5 TABLETS BY MOUTH TWICE WEEKLY    No current facility-administered medications on file prior to visit.    Allergies  No Known Allergies    Laboratory Studies:  Imaging-  Echo (2/23/2024): Technically difficult study due to poor acoustic windows. Normal right and left ventricular size and systolic function. The calculated single biplane left ventricular ejection fraction is 55%. Normal right ventricular systolic pressure. No pericardial effusion.    Echo (9/8/2023): Technically difficult study due to poor acoustic windows. Normal right and left ventricular size and systolic function. The calculated single biplane left ventricular ejection fraction is 63%. Normal right  ventricular systolic pressure. No pericardial effusion.    Cardiac MR (9/9/2021):   1. Normal left ventricular function.  2. Gadolinium sequences not obtained because of patient inability to hold his breath for long enough to perform these series.    Cardiac MR (1/13/2020): normal cardiac MRI; no evidence of fibrosis; LVEF 56%    Electrophysiology-  EKG (2/23/2024): sinus rhythm, possible right ventricular hypertrophy, anterior T-wave inversion    EKG (9/8/2023): sinus rhythm    Holter (8/16-8/18/2022): Sinus rhythm. HR range , average HR 79 bpm. No pauses or blocks. No tachyarrhythmias. Rare (<0.01%) PACs. No ventricular ectopy.    ZioPatch (1/22-1/24/2021): Screening for arrhythmias. Normal diurnal variation. Sinus rhythm predominates with HR , average 87 bpm. No significant pauses or blocks. Rare (<1%) premature atrial contractions. No ventricular ectopy. Patient triggered events totaling one time revealing sinus rhythm. No symptoms reported. Normal 4 day recording.    Labs-   Latest Reference Range & Units 02/23/24 16:14   Sodium 135 - 145 mmol/L 138   Potassium 3.4 - 5.3 mmol/L 4.1   Chloride 98 - 107 mmol/L 100   Carbon Dioxide (CO2) 22 - 29 mmol/L 28   Urea Nitrogen 6.0 - 20.0 mg/dL 10.1   Creatinine 0.67 - 1.17 mg/dL 0.35 (L)   GFR Estimate >60 mL/min/1.73m2 >90   Cystatin C 0.6 - 1.0 mg/L 1.0   GFR Calculated with Cystatin C >=60 mL/min/1.73m2 >90   Calcium 8.6 - 10.0 mg/dL 8.9   Anion Gap 7 - 15 mmol/L 10   Phosphorus 2.5 - 4.5 mg/dL 4.0   Albumin 3.5 - 5.2 g/dL 4.1   Protein Total 6.4 - 8.3 g/dL 6.5   Alkaline Phosphatase 65 - 260 U/L 81   ALT 0 - 50 U/L 55 (H)   AST 0 - 35 U/L 69 (H)   25 OH Vit D total 20 - 75 ug/L <50   25 OH Vit D2 ug/L <5   25 OH Vit D3 ug/L 45   Bilirubin Total <=1.2 mg/dL 0.8   FSH 1.5 - 12.4 mIU/mL 2.5   Glucose 70 - 99 mg/dL 78   Luteinizing Hormone 1.7 - 8.6 mIU/mL 3.1   N-Terminal Pro Bnp 0 - 450 pg/mL 58   T4 Free 1.00 - 1.60 ng/dL 1.80 (H)   Testosterone Total 240  - 950 ng/dL 200 (L)   Troponin I, High Sensitivity <=20 ng/L 3   TSH 0.50 - 4.30 uIU/mL 1.28   WBC 4.0 - 11.0 10e3/uL 9.5   Hemoglobin 13.3 - 17.7 g/dL 14.2   Hematocrit 40.0 - 53.0 % 44.2   Platelet Count 150 - 450 10e3/uL 315   RBC Count 4.40 - 5.90 10e6/uL 5.14   MCV 78 - 100 fL 86   MCH 26.5 - 33.0 pg 27.6   MCHC 31.5 - 36.5 g/dL 32.1   RDW 10.0 - 15.0 % 14.3   % Neutrophils % 58   % Lymphocytes % 30   % Monocytes % 7   % Eosinophils % 3   % Basophils % 1   Absolute Basophils 0.0 - 0.2 10e3/uL 0.1   Absolute Eosinophils 0.0 - 0.7 10e3/uL 0.2   Absolute Immature Granulocytes <=0.4 10e3/uL 0.1   Absolute Lymphocytes 0.8 - 5.3 10e3/uL 2.9   Absolute Monocytes 0.0 - 1.3 10e3/uL 0.7   % Immature Granulocytes % 1   Absolute Neutrophils 1.6 - 8.3 10e3/uL 5.7   Absolute NRBCs 10e3/uL 0.0   NRBCs per 100 WBC <1 /100 0   Parathyroid Hormone Intact 15 - 65 pg/mL 33   (L): Data is abnormally low  (H): Data is abnormally high    Assessment:  Patient Active Problem List   Diagnosis    Low bone mineral density    Vitamin D deficiency    Goiter - mild    Duchenne muscular dystrophy (H) deletion of exon 55    Sinus tachycardia    Hypocalcemia    VIVIANE (obstructive sleep apnea)    Restrictive lung disease due to muscular dystrophy (H)    Other osteoporosis without current pathological fracture    Delayed puberty    Cardiomyopathy in Duchenne muscular dystrophy (H)       Christopher is a 18 year old male with Duchenne muscular dystrophy and restrictive lung disease and obstructive sleep apnea now requiring AVAPS use during sleep who continues to have normal cardiac function on prophylactic carvedilol, enalapril and eplerenone. There is an emerging body of evidence that a prophylactic heart failure medication can prevent Duchenne-related cardiomyopathy. He has had sinus tachycardia, which is consistent Duchenne-related autonomic dysfunction that may be detrimental to heart function over time. Carvedilol appears to be helping with  this.    In September 2023, he had an initially very low right arm blood pressure that was normal on recheck. His brachial pulse was not easily palpated there and was quite positional; however, there were no signs or symptoms of claudication.    Plan:  - continue the following medication:  enalapril 10 mg PO BID for delaying progression of Duchenne cardiomyopathy  carvedilol 25 mg PO BID for tachycardia from Duchenne-related autonomic dysfunction for delaying progression of Duchenne cardiomyopathy  eplerenone 50 mg PO daily  - will forgo Holter monitor due to adhesive allergy  - will forgo cardiac MRIs due to difficulty performing breath-holds  - CMP, cystatin-C, NT-pro BNP and troponin I every Winter  - BPs should be obtained from the left arm preferentially    Activity Restriction: none  SBE prophylaxis: NOT indicated    Follow-up: in 6 months for clinic visit with EKG and echocardiogram    Thank you for allowing me to participate in Christopher's care. Please contact me with questions or concerns.    Sincerely,    Peter Bright MD    Division of Pediatric Cardiology  Department of Pediatrics  [facility]    CC:   Patient Care Team:  Jose Finn as PCP - General (Family Medicine)      Virtual visit was combined with his brother, Holger's visit  Virtual visit start time: 1602 hrs  Virtual visit end time: 1606 hrs  Total time: 4 min + 15 minutes for chart review and documentation

## 2024-04-10 DIAGNOSIS — G71.01 DUCHENNE MUSCULAR DYSTROPHY (H): Primary | ICD-10-CM

## 2024-04-11 DIAGNOSIS — R26.89 UNABLE TO PERFORM BED MOBILITY WITHOUT ASSISTANCE: ICD-10-CM

## 2024-04-11 DIAGNOSIS — R26.2 UNABLE TO AMBULATE: ICD-10-CM

## 2024-04-11 DIAGNOSIS — G71.01 DUCHENNE MUSCULAR DYSTROPHY (H): Primary | Chronic | ICD-10-CM

## 2024-04-11 DIAGNOSIS — G70.9 NEUROMUSCULAR WEAKNESS (H): ICD-10-CM

## 2024-04-11 NOTE — TELEPHONE ENCOUNTER
PA Initiation    Medication: EPLERENONE 25 MG PO TABS  Insurance Company: [employer] - Phone [phone] Fax [phone]  Pharmacy Filling the Rx: [id number] - [place] - [address]  Filling Pharmacy Phone: [phone]  Filling Pharmacy Fax:    Start Date: 4/11/2024

## 2024-04-15 NOTE — TELEPHONE ENCOUNTER
Prior Authorization Not Needed per Insurance    Medication: EPLERENONE 25 MG PO TABS  Insurance Company: [employer] - Phone [phone] Fax [phone]  Expected CoPay: $    Pharmacy Filling the Rx: [id number] - [place], [place] - [address]  Pharmacy Notified: Yes  Patient Notified: Yes

## 2024-04-18 ENCOUNTER — ANCILLARY PROCEDURE (OUTPATIENT)
Dept: GENERAL RADIOLOGY | Facility: CLINIC | Age: 19
End: 2024-04-18
Attending: ORTHOPAEDIC SURGERY
Payer: COMMERCIAL

## 2024-04-18 ENCOUNTER — OFFICE VISIT (OUTPATIENT)
Dept: ORTHOPEDICS | Facility: CLINIC | Age: 19
End: 2024-04-18
Payer: COMMERCIAL

## 2024-04-18 VITALS — HEIGHT: 69 IN | WEIGHT: 212 LBS | BODY MASS INDEX: 31.4 KG/M2

## 2024-04-18 DIAGNOSIS — G71.01 DUCHENNE MUSCULAR DYSTROPHY (H): ICD-10-CM

## 2024-04-18 DIAGNOSIS — G71.01 DUCHENNE MUSCULAR DYSTROPHY (H): Primary | ICD-10-CM

## 2024-04-18 PROCEDURE — 72082 X-RAY EXAM ENTIRE SPI 2/3 VW: CPT | Performed by: STUDENT IN AN ORGANIZED HEALTH CARE EDUCATION/TRAINING PROGRAM

## 2024-04-18 PROCEDURE — 99213 OFFICE O/P EST LOW 20 MIN: CPT | Performed by: PHYSICIAN ASSISTANT

## 2024-04-18 NOTE — PROGRESS NOTES
REASON FOR VISIT: RECHECK    REFERRING PHYSICIAN: Referred Self     PRIMARY CARE PHYSICIAN: Jose Finn    HISTORY OF PRESENT ILLNESS: Christopher Gorman is a 18 year old male who presents for follow-up on Duchenne muscular dystrophy.  Here today with his brother and parents.  Follows with Dr. Cavazos.  He and his parents report good sitting tolerance.  No other concerns or complaints today     PHYSICAL EXAM:   Constitutional - Patient is healthy, well-nourished and appears stated age.   Respiratory - Patient is breathing normally and in no respiratory distress.   Skin - No suspicious rashes or lesions.   Psychiatric - Normal mood and affect.   Cardiovascular - Peripheral pulses are normal.   Eyes - Visual acuity is normal to the written word.   ENT - Hearing intact to the spoken word.   GI - No abdominal distention.   Musculoskeletal -patient in power wheelchair.  No significant pelvic obliquity.  Able to move upper extremities equally.        IMAGIN2024 full spine seated AP/lateral view x-rays: Stable appearance of known pelvic obliquity, scoliosis and kyphosis curves . See full radiologic report in chart.    CLINICAL ASSESSMENT:   1. Duchenne muscular dystrophy with mild scoliosis and pelvic obliquity      DISCUSSION/PLAN:   Reviewed today's updated images which are stable. Reassuringly he still has good sitting tolerance. Discussed that at this point there is lower likelihood that he would go on to need surgery. We still recommend annual visit with Xrs to monitor.  Reviewed that Dr. Johnson will be retiring in the next year.  Recommended following up with Dr. Gil in the future.    All questions and concerns were answered to the patient's apparent satisfaction before leaving the clinic.     Thank you for allowing Dr. Johnson and I to participate in the care of Christopher Gorman.    Respectfully,  Shelly Mcdaniel PA-C    I saw and evaluated Juanito today.  I independently ordered and interpreted the  imaging studies.  His pelvic obliquity has remained stable.  His curve magnitude is within measurement variability.  He is able to sit all day in his chair without any complaints.  He is stable at this point.  The probability that he will go on to need surgery at this point in time is very low.  It would be appropriate to see him back in 1 year.  I explained that I will stop operating at the end of this year but will see clinic through the end of March of next year.  After that I would recommend that he be seen by my partner Dr. Gil.    I was involved in greater than 50% of the contact time for this visit.    All Johnson MD      CC  Copy to patient    [address]  [facility] [address]

## 2024-04-18 NOTE — LETTER
2024         RE: Christopher Gorman  [address]  [place] [facility] [place] [address]        Dear Colleague,    Thank you for referring your patient, Christopher Gorman, to the [facility] [place]. Please see a copy of my visit note below.    REASON FOR VISIT: RECHECK    REFERRING PHYSICIAN: Referred Self     PRIMARY CARE PHYSICIAN: Jose Finn    HISTORY OF PRESENT ILLNESS: Christopher Gorman is a 18 year old male who presents for follow-up on Duchenne muscular dystrophy.  Here today with his brother and parents.  Follows with Dr. Cavazos.  He and his parents report good sitting tolerance.  No other concerns or complaints today     PHYSICAL EXAM:   Constitutional - Patient is healthy, well-nourished and appears stated age.   Respiratory - Patient is breathing normally and in no respiratory distress.   Skin - No suspicious rashes or lesions.   Psychiatric - Normal mood and affect.   Cardiovascular - Peripheral pulses are normal.   Eyes - Visual acuity is normal to the written word.   ENT - Hearing intact to the spoken word.   GI - No abdominal distention.   Musculoskeletal -patient in power wheelchair.  No significant pelvic obliquity.  Able to move upper extremities equally.        IMAGIN2024 full spine seated AP/lateral view x-rays: Stable appearance of known pelvic obliquity, scoliosis and kyphosis curves . See full radiologic report in chart.    CLINICAL ASSESSMENT:   1. Duchenne muscular dystrophy with mild scoliosis and pelvic obliquity      DISCUSSION/PLAN:   Reviewed today's updated images which are stable. Reassuringly he still has good sitting tolerance. Discussed that at this point there is lower likelihood that he would go on to need surgery. We still recommend annual visit with Xrs to monitor.  Reviewed that Dr. Johnson will be retiring in the next year.  Recommended following up with Dr. Gil in the future.    All questions and concerns were answered to the  patient's apparent satisfaction before leaving the clinic.     Thank you for allowing Dr. Johnson and I to participate in the care of Christopher Gorman.    Respectfully,  Shelly Mcdaniel PA-C    I saw and evaluated Juanito today.  I independently ordered and interpreted the imaging studies.  His pelvic obliquity has remained stable.  His curve magnitude is within measurement variability.  He is able to sit all day in his chair without any complaints.  He is stable at this point.  The probability that he will go on to need surgery at this point in time is very low.  It would be appropriate to see him back in 1 year.  I explained that I will stop operating at the end of this year but will see clinic through the end of March of next year.  After that I would recommend that he be seen by my partner Dr. Gil.    I was involved in greater than 50% of the contact time for this visit.    All Johnson MD      CC  Copy to patient    [address] [place] [address] [address]  [facility] [address]

## 2024-04-18 NOTE — NURSING NOTE
"Reason For Visit:   Chief Complaint   Patient presents with    RECHECK     Duchenne muscular dystrophy       Primary MD: Jose Finn  Ref. MD: EST    ?  No  Occupation Student.     Date of injury: No  Type of injury: No.     Date of surgery: No  Type of surgery: NO.     Smoker: No  Request smoking cessation information: No    Ht 1.753 m (5' 9\")   Wt 96.2 kg (212 lb)   BMI 31.31 kg/m      Pain Assessment  Patient Currently in Pain: Denies    Oswestry (YUNIER) Questionnaire        4/18/2024    10:00 AM   OSWESTRY DISABILITY INDEX   Count 5   Sum 6   Oswestry Score (%) 24 %        Visual Analog Pain Scale  Back Pain Scale 0-10: 0  Right leg pain: 0  Left leg pain: 0  Neck Pain Scale 0-10: 0  Right arm pain: 0  Left arm pain: 0    Promis 10 Assessment        4/18/2024    10:00 AM   PROMIS 10   In general, would you say your health is: Very good   In general, would you say your quality of life is: Excellent   In general, how would you rate your physical health? Very good   In general, how would you rate your mental health, including your mood and your ability to think? Excellent   In general, how would you rate your satisfaction with your social activities and relationships? Very good   In general, please rate how well you carry out your usual social activities and roles Excellent   To what extent are you able to carry out your everyday physical activities such as walking, climbing stairs, carrying groceries, or moving a chair? Mostly   In the past 7 days, how often have you been bothered by emotional problems such as feeling anxious, depressed, or irritable? Never   In the past 7 days, how would you rate your fatigue on average? None   In the past 7 days, how would you rate your pain on average, where 0 means no pain, and 10 means worst imaginable pain? 10   In general, would you say your health is: 4   In general, would you say your quality of life is: 5   In general, how would you rate your physical " health? 4   In general, how would you rate your mental health, including your mood and your ability to think? 5   In general, how would you rate your satisfaction with your social activities and relationships? 4   In general, please rate how well you carry out your usual social activities and roles. (This includes activities at home, at work and in your community, and responsibilities as a parent, child, spouse, employee, friend, etc.) 5   To what extent are you able to carry out your everyday physical activities such as walking, climbing stairs, carrying groceries, or moving a chair? 4   In the past 7 days, how often have you been bothered by emotional problems such as feeling anxious, depressed, or irritable? 1   In the past 7 days, how would you rate your fatigue on average? 1   In the past 7 days, how would you rate your pain on average, where 0 means no pain, and 10 means worst imaginable pain? 10   Global Mental Health Score 19   Global Physical Health Score 14   PROMIS TOTAL - SUBSCORES 33                Montse Logan LPN

## 2024-04-28 ENCOUNTER — HEALTH MAINTENANCE LETTER (OUTPATIENT)
Age: 19
End: 2024-04-28

## 2024-05-15 DIAGNOSIS — M85.80 OSTEOPENIA, UNSPECIFIED LOCATION: ICD-10-CM

## 2024-05-15 RX ORDER — CALCIUM CARBONATE 500(1250)
2 TABLET ORAL 2 TIMES DAILY
Qty: 120 TABLET | Refills: 5 | Status: SHIPPED | OUTPATIENT
Start: 2024-05-15

## 2024-05-29 NOTE — PROGRESS NOTES
REQUISITION AND JUSTIFICATION FOR DURABLE MEDICAL EQUIPMENT    2024    To Whom It May Concern:     The following is a letter of medical necessity serving as an addendum to the medical and functional justification in the Physical Therapy Evaluation on 2024 for a sleep positioning system for Christopher Gorman.      Patient Name:  Christopher Gorman  MR #:  [id number]  : 2005  Age/Gender: 18-year-old male  Diagnosis: Duchenne muscular dystrophy (G71.01)   Height: 67 in  Weight: 191 lbs  Visit Date:  Patient seen for physical therapy evaluation by Adeline Smith PT on 2024. Home trial was completed with Hudson Campoverde.   Item(s) requested: [employer] teen/adult sleep system with additional SP small pillow      CLINICAL CRITERIA FOR MOBILITY ASSISTIVE EQUIPMENT    Coverage Criteria per Local Coverage Determination    History/diagnosis  Christopher Gorman is a 18-year-old male who has Duchenne muscular dystrophy. As a result, he has minimal strength, decreased bone mineral density, decreased activity tolerance, constipation, pain, impaired respiratory function, and complete dependence with activities of daily living.   Christopher spends 8-16 hours per day in his current bed.      Mobility/function  Christopher is non-ambulatory and relies on a power wheelchair for activities of daily living in the home and community.   Christopher transfers with total assistance with a cheng lift.  Christopher has increased risk of skin breakdown due to inability to complete effective weight shifts. He is dependent on his caregivers for frequent adjustments and repositioning when he is lying in bed.   As described in the evaluation, Christopher has severe muscle weakness of his upper extremities, lower extremities, and trunk. Strength of legs is 1/5 to 2/5 for one maximal repetition, resulting in the inability to transition into standing, climb stairs, stand, walk, or roll over in bed. Strength of his arms for one maximal repetition 1/5 to 4/5, resulting in  the inability to assist with rolling in bed.       Current sleep environment  Christopher currently is sleeping on a standard queen mattress with foam memory topper. He uses multiple pillows for positioning. The multiple pillows move throughout the night, resulting in him needing to be repositioned frequently (2-4x) at night. The pillows often slide down in the bed and need to be rearranged. The way he is positioned puts him at risk to progress his already severe contractures and puts him at high risk of pressure injuries.     Trial  Christopher completed a successful home trial with the [employer] sleep system. He was able to be positioned with neutral alignment in supine with minimal pressure through his coccyx, heels, and ears. He completed a trial of two positions, one in supported side lying and the other in side lying at 3-40 degrees as suggested by the Pressure Injury Advisory Panel.  There was no abduction, adduction, or windsweeping, which rotate the trunk, creating misalignment, which is how he was typically positioned with the multiple pillows previously.     The need for this equipment is LIFETIME.     As a result of this assessment, the following sleep system and components are recommended to meet Christopher's needs for safe and appropriate sleep environment:    Simple stuff works sleep system teen-adult kit - this is the sleep system that will support Christopher while he is in bed. The kit consists of non-slip base layers, toppers and sheets, and a variety of supports to allow Christopher to lie in a neutral, supported position in supine or side-lying. The supports allow for neutral hip and spine positioning to prevent progressive contractures and impairments. The simple stuff works sleep system comes with many unique benefits:   Contracture prevention: The significant amount of time that Christopher spends in bed allows for potential time for postural care through positioning. Long duration positioning is most effective in  contracture prevention rather than brief caregiver assisted stretching. This is especially important for Christopher due to his hip contractures and scoliosis.   Pressure injury risk reduction: when lying in a supported, symmetrical position, the body has the maximum base of support. This distributes the weight of the body more evenly, resulting in decreased risk of pressure injury. Pressure mapping studies found 100% of all people who used the [employer] sleep system found a significant reduction in pressure areas across their bodies.   Decreased caregiver burden: the amount of time spent positioning Christopher each night before bed and repositioning him in the middle of the night will be significantly reduced, resulting in decreased dependence on caregiver hours and effort.   Improved thermoregulation: the [employer] products are made using moisture wicking fibers, resulting in  thermoneutral  temperatures; therefore, they neither give heat to the body nor take heat away from the body.   Reduction of postural asymmetries: maintaining the body in a midline position can reduce the rotation force on the chest and trunk, which protects the ribcage shape and alignment of the spine. The symmetry of the pelvis can be maintained or improved, which can have an effect on hip joint alignment, spinal alignment, and the shape of the ribcage.   Improved internal organ function: The reductive of pressure external forces on the ribcage and spine may result in improved respiratory function, improved cardiac function, improved ability to swallow and increased ability to eat and drink, improved digestion, and reduced constipation.     SP small pillow - This additional SP small pillow is needed to be able to support his arms bilaterally in supine. The position with this SP small pillow minimizes pressure on the lower shoulder and will create less numbness than he is currently experiencing.       This equipment is reasonable and  necessary with reference to accepted standards of medical practice and treatment of this patient's condition and is not being recommended as a convenience item. Without this recommended equipment, he is highly likely to develop pressure sores and/or postural compensation, which those costs far exceed the cost of the requested equipment. This recommendation is the most appropriate and cost-effective option for meeting the client's functional and medical needs. Please authorize payment for the sleep system and components.     Thank you,          Adeline Smith, PT, DPT  Physical Therapist  [facility]  [address], [facility] [address] [address]  Phone: [phone]  Fax: [phone]  Email: [email or web address]

## 2024-07-21 DIAGNOSIS — G71.01 DUCHENNE MUSCULAR DYSTROPHY (H): ICD-10-CM

## 2024-07-22 RX ORDER — PREDNISONE 20 MG/1
TABLET ORAL
Qty: 40 TABLET | Refills: 5 | Status: SHIPPED | OUTPATIENT
Start: 2024-07-22

## 2024-09-26 NOTE — NURSING NOTE
"Chief Complaint   Patient presents with     RECHECK     Follow up MD     /63   Pulse 79   Temp 97.4  F (36.3  C) (Oral)   Resp 20   Ht 5' 10.47\" (179 cm)   Wt 164 lb 14.5 oz (74.8 kg)   SpO2 99%   BMI 23.35 kg/m    Gianna Ferrer LPN    " No

## 2024-10-08 ENCOUNTER — CARE COORDINATION (OUTPATIENT)
Dept: PEDIATRIC NEUROLOGY | Facility: CLINIC | Age: 19
End: 2024-10-08
Payer: COMMERCIAL

## 2024-10-08 DIAGNOSIS — G71.01 DUCHENNE MUSCULAR DYSTROPHY (H): Primary | ICD-10-CM

## 2024-10-10 ENCOUNTER — TELEPHONE (OUTPATIENT)
Dept: PEDIATRIC NEUROLOGY | Facility: CLINIC | Age: 19
End: 2024-10-10

## 2024-10-10 NOTE — TELEPHONE ENCOUNTER
[facility] Call Center    Phone Message    May a detailed message be left on voicemail: yes     Reason for Call: Other: Dad called to reschedule all of patient's appointments for tomorrow 10/11 for PFT, PT, Echo and Muscular Dyst. Please call Dad back to reschedule. Thanks.     Action Taken: Other: Peds Muscular Dystrophy    Travel Screening: Not Applicable

## 2024-10-11 ENCOUNTER — THERAPY VISIT (OUTPATIENT)
Dept: PHYSICAL THERAPY | Facility: CLINIC | Age: 19
End: 2024-10-11
Attending: NURSE PRACTITIONER
Payer: COMMERCIAL

## 2024-10-11 ENCOUNTER — OFFICE VISIT (OUTPATIENT)
Dept: PULMONOLOGY | Facility: CLINIC | Age: 19
End: 2024-10-11
Attending: PEDIATRICS
Payer: COMMERCIAL

## 2024-10-11 ENCOUNTER — OFFICE VISIT (OUTPATIENT)
Dept: PEDIATRIC NEUROLOGY | Facility: CLINIC | Age: 19
End: 2024-10-11
Attending: PEDIATRICS
Payer: COMMERCIAL

## 2024-10-11 ENCOUNTER — HOSPITAL ENCOUNTER (OUTPATIENT)
Dept: CARDIOLOGY | Facility: CLINIC | Age: 19
Discharge: HOME | End: 2024-10-11
Attending: PEDIATRICS
Payer: COMMERCIAL

## 2024-10-11 VITALS
RESPIRATION RATE: 16 BRPM | DIASTOLIC BLOOD PRESSURE: 54 MMHG | OXYGEN SATURATION: 99 % | SYSTOLIC BLOOD PRESSURE: 85 MMHG | HEART RATE: 64 BPM

## 2024-10-11 DIAGNOSIS — G71.01 DUCHENNE MUSCULAR DYSTROPHY (H): ICD-10-CM

## 2024-10-11 DIAGNOSIS — G71.01 DUCHENNE MUSCULAR DYSTROPHY (H): Primary | ICD-10-CM

## 2024-10-11 DIAGNOSIS — G70.9 NEUROMUSCULAR WEAKNESS (H): ICD-10-CM

## 2024-10-11 DIAGNOSIS — G71.00 RESTRICTIVE LUNG DISEASE DUE TO MUSCULAR DYSTROPHY (H): ICD-10-CM

## 2024-10-11 DIAGNOSIS — Z20.822 EXPOSURE TO 2019 NOVEL CORONAVIRUS: ICD-10-CM

## 2024-10-11 DIAGNOSIS — G71.01 CARDIOMYOPATHY IN DUCHENNE MUSCULAR DYSTROPHY (H): ICD-10-CM

## 2024-10-11 DIAGNOSIS — I43 CARDIOMYOPATHY IN DUCHENNE MUSCULAR DYSTROPHY (H): ICD-10-CM

## 2024-10-11 DIAGNOSIS — J98.4 RESTRICTIVE LUNG DISEASE DUE TO MUSCULAR DYSTROPHY (H): ICD-10-CM

## 2024-10-11 LAB
EXPTIME-PRE: 6.25 SEC
FEF2575-%PRED-POST: 5 %
FEF2575-%PRED-PRE: 10 %
FEF2575-POST: 0.27 L/SEC
FEF2575-PRE: 0.5 L/SEC
FEF2575-PRED: 4.68 L/SEC
FEFMAX-%PRED-PRE: 19 %
FEFMAX-PRE: 1.96 L/SEC
FEFMAX-PRED: 9.86 L/SEC
FEV1-%PRED-PRE: 25 %
FEV1-PRE: 1.06 L
FEV1FEV6-PRE: 57 %
FEV1FEV6-PRED: 85 %
FEV1FVC-PRE: 57 %
FEV1FVC-PRED: 87 %
FIFMAX-PRE: 1.35 L/SEC
FLUAV RNA SPEC QL NAA+PROBE: NEGATIVE
FLUBV RNA RESP QL NAA+PROBE: NEGATIVE
FVC-%PRED-PRE: 38 %
FVC-PRE: 1.86 L
FVC-PRED: 4.88 L
MEP-PRE: 39 CMH2O
MIP-PRE: -35 CMH2O
RSV RNA SPEC NAA+PROBE: NEGATIVE
SARS-COV-2 RNA RESP QL NAA+PROBE: NEGATIVE

## 2024-10-11 PROCEDURE — 93306 TTE W/DOPPLER COMPLETE: CPT | Mod: 26 | Performed by: PEDIATRICS

## 2024-10-11 PROCEDURE — G0463 HOSPITAL OUTPT CLINIC VISIT: HCPCS | Performed by: PEDIATRICS

## 2024-10-11 PROCEDURE — 99215 OFFICE O/P EST HI 40 MIN: CPT | Mod: 25 | Performed by: PEDIATRICS

## 2024-10-11 PROCEDURE — 93005 ELECTROCARDIOGRAM TRACING: CPT

## 2024-10-11 PROCEDURE — 93306 TTE W/DOPPLER COMPLETE: CPT

## 2024-10-11 PROCEDURE — 94375 RESPIRATORY FLOW VOLUME LOOP: CPT

## 2024-10-11 PROCEDURE — 94799 UNLISTED PULMONARY SVC/PX: CPT

## 2024-10-11 PROCEDURE — 87637 SARSCOV2&INF A&B&RSV AMP PRB: CPT | Performed by: PEDIATRICS

## 2024-10-11 PROCEDURE — 94375 RESPIRATORY FLOW VOLUME LOOP: CPT | Mod: 26 | Performed by: PEDIATRICS

## 2024-10-11 PROCEDURE — 94799 UNLISTED PULMONARY SVC/PX: CPT | Mod: 26 | Performed by: PEDIATRICS

## 2024-10-11 PROCEDURE — 97161 PT EVAL LOW COMPLEX 20 MIN: CPT | Mod: GP | Performed by: PHYSICAL THERAPIST

## 2024-10-11 NOTE — Clinical Note
10/11/2024      RE: Christopher Gorman  [address]  [facility] [address]     Dear Colleague,    Thank you for the opportunity to participate in the care of your patient, Christopher Gorman, at the [facility] at [facility]. Please see a copy of my visit note below.    Pediatrics Pulmonary - Provider Note  General Pulmonary - Return Visit    Patient: Christopher Gorman MRN# [id number]   Encounter: Oct 11, 2024 : 2005      I saw Christopher at the Pediatric Pulmonary Clinic for a routine MDA visit accompanied by his Mother and father.    Subjective:   HPI: Last visit was on 2024    Today : no infections since last visit, brother has covid   Reports leak after his mask moves     Epap 6  IPAP 10 min, 25 max     Cough appears strong, breath stacking twice a day, helps with expectorations  Denies dyspnea  Breath stacking twice a day  No difficulties swallowing or reflux    Sleeps well, bedtime is at 10:30 pm, SOL 5 min, sleeps through the night, needs reposition 3 times per night, wakes up 7-7:30 am, hard to get up, wants to sleep longer  Does not nap, 1 soda a day.        Juanito is a 18-year-old male patient with history of Duchenne muscular dystrophy, restrictive lung disease and nocturnal hypoventilation as demonstrated on sleep study completed in 2019 when he was found to have an RDI of 9.5 and sleep fragmentation.      Since last visit he had no respiratory infections. He has not needed any steroids or antibiotics. He denies cough, or shortness of breath.   He is continue using his AVAPS every night    He denies issues with mask, or pressure tolerance. Reports getting better sleep with use of AVAP    Per vent download, his compliance was 100%  Settings: AVAPS; EPAP 6, IPAP 10-25, R 8, and Tv 550ml.  He reports that the vent will occasionally give him a breath before he wants it.  He triggers 62% of his breaths.  Avg RR 13.6 with lowest being 10.      Current bedtime is at 9-930 PM on weekdays/weekends.  Sleep latency is usually 30 minutes because he is on his phone.  He wakes up on avg 3 times per night to be repositioned. Wakes up at 830-9 AM on weekdays/weekends. He does not take naps, no reported excessive daytime sleepiness.      He is using breath stacking 3 times per day.    He had a sleep study done on 10/2/2022   Residual AHI 0.5 events per hour, however no REM supine on the final pressure. Loud snoring, normal respiratory rate and pattern. No sleep associated hypoxemia or hypoventilation.Continue iVAPS 16/4.8/5/4/20/1.5/0.8/300 cmH2O    Severely elevated periodic limb movement associated with arousals.    Allergies  Allergies as of 10/11/2024    (No Known Allergies)     Current Outpatient Medications   Medication Sig Dispense Refill    acetaminophen (TYLENOL) 325 MG tablet Take 2 tablets (650 mg) by mouth every 6 hours as needed for mild pain or fever 1 Bottle 0    Ascorbic Acid (VITAMIN C PO) Take 3 tablets by mouth daily      CALCIUM ANTACID 500 MG chewable tablet 1 chew tab 2 times daily  0    calcium carbonate 500 mg, elemental, (OSCAL) 500 MG tablet TAKE 2 TABLETS (1,000 MG) BY MOUTH 2 TIMES DAILY 120 tablet 5    carvedilol (COREG) 25 MG tablet Take 1 tablet (25 mg) by mouth 2 times daily (with meals) 60 tablet 11    Cholecalciferol (VITAMIN D3) 50 MCG (2000 UT) CAPS Take 2 capsules by mouth daily 60 capsule 5    enalapril (VASOTEC) 10 MG tablet Take 1 tablet (10 mg) by mouth 2 times daily 60 tablet 11    eplerenone (INSPRA) 25 MG tablet Take 1 tablet (25 mg) by mouth daily 90 tablet 3    FLUoxetine (PROZAC) 10 MG capsule Take 10 mg by mouth daily      order for DME Sling for Micah Lift. 1 Units 0    predniSONE (DELTASONE) 20 MG tablet TAKE 5 TABLETS BY MOUTH TWICE WEEKLY 40 tablet 5       Past medical history, surgical history and family history reviewed with patient/parent today, no changes.      Jesus QUINONES  "comprehensive review of systems was performed and is negative except as noted in the HPI.      Objective:     Physical Exam  BP (!) 85/54 (BP Location: Right arm, Patient Position: Sitting, Cuff Size: Adult Regular)   Pulse 64   Resp 16   SpO2 99%   Ht Readings from Last 2 Encounters:   04/18/24 5' 9\" (175.3 cm) (42%, Z= -0.21)*   02/23/24 5' 6.93\" (170 cm) (17%, Z= -0.94)*     * Growth percentiles are based on CDC (Boys, 2-20 Years) data.     Wt Readings from Last 2 Encounters:   04/18/24 212 lb (96.2 kg) (96%, Z= 1.71)*   02/23/24 191 lb 2.2 oz (86.7 kg) (89%, Z= 1.22)*     * Growth percentiles are based on CDC (Boys, 2-20 Years) data.     BMI %: > 36 months -  No height and weight on file for this encounter.    Constitutional:  No distress, comfortable, pleasant. Sitting in motorized wheelchair in no acute distress  Vital signs:  Reviewed and normal.  Eyes:  Anicteric, normal extra-ocular movements.  Ears, Nose and Throat:   No rhinorrhea.  Good palatal elevation.  Throat was clear..  Neck:  Supple with full range of motion, no lymphadenopathy.  Cardiovascular:   Normal S1 and S2.  No gallop or murmurs.  Chest:  Symmetrical, no retractions.  Respiratory: Moderately reduced breath sound amplitude particularly posteriorly at lung bases.  Louder breath sounds anteriorly, but no adventitious sounds heard anywhere.  Gastrointestinal:  Positive bowel sounds, no hepatosplenomegaly, no masses,   Musculoskeletal: No digital clubbing.  Skin: Mild facial acne.  Neurological:  Wheelchair-bound with generalized hypotonia.      Spirometry performed today in the office setting.   The results of this test does not meet the ATS standards for acceptability and repeatability due to end of test criteria not being met.      Spirometry Interpretation:  Severe restriction with minimal improve in lung function since last PFT.  Peak cough flows are low  No significant change in MIP/MEP or PCF      Assessment     Juanito is a 18year-old " male with history of Duchenne muscular dystrophy with severe restrictive pattern pulmonary function test which has been slightly decreased from last as well as nocturnal hypoventilation. His ventilator is occasionally giving him a breath before Christopher would like it. With him only triggering 62% of breaths, we will decrease the RR from 10 to 8 to allow for Christopher to drive his respiration initiation. Lung function is minimally decreased from last testing likely from underlying diagnosis. Will continue to trend lung function at every visit.      Plan:     1. Restrictive lung disease due to muscular dystrophy (H)  2. VIVIANE (obstructive sleep apnea)  - Decrease NIV rate from 10 to 8.  - Continue breath stacking techniques 3 times per day.  - Follow up in 6 months.      Ismael Mckeon DO, PGY-5  [facility]  Pediatric Pulmonology Fellow      Jose Lin    Copy to patient  MIRNA RUTLEDGE TRAVIS  [address]  [place] [address]              Please do not hesitate to contact me if you have any questions/concerns.     Sincerely,       Courtney Tompkins MD

## 2024-10-11 NOTE — PATIENT INSTRUCTIONS
We will start cough assist 3 times a day with auto track, during illnesses  You can use suction after treatment  When sick you can use continuous pulse oximetry and add an extra cough assist every 30 min as needed for sats under 95%    Continue breath stacking 3 times a day    Continue non invasive AVAPs during sleep  EPAP: 7 cwp  IPAP 10-25 cwp  TV: 550 ml  Rate: 8  New mask fitting to help with large leak    Follow up in 3-4 months    Courtney Ponce MD    Pediatric Department  Division of Pediatric Pulmonology and Sleep Medicine  Pager # [phone]  Email: [email or web address]

## 2024-10-11 NOTE — PROGRESS NOTES
Pediatrics Pulmonary - Provider Note  General Pulmonary - Return Visit    Patient: Christopher Gorman MRN# [id number]   Encounter: Oct 11, 2024 : 2005      I saw Christopher at the Pediatric Pulmonary Clinic for a routine MDA visit accompanied by his Mother and father.    Subjective:   HPI: Last visit was on 2024    Christopher is a 19 year-old male patient with history of Duchenne muscular dystrophy, restrictive lung disease and nocturnal hypoventilation as demonstrated on sleep study completed in 2019 when he was found to have an RDI of 9.5 and sleep fragmentation.    He receives non invasive ventilation with PC-AVAPS: Epap 6, IPAP 10 min, 25 max,  ml    Since last visit he denies having respirtatory infections, brother was found to have covid 19 today with symptoms starting 4 days ago but he is asymptomatic at the moment  In terms of NIV: he reports leak when his mask moves but overall seems to fit well with good tolerance  Downloads show exhaled tidal volume from 700-750 average and large leak for 75% of the night, usage is excellent and pressures otherwise seem appropriate  Average IPAP 14    His ability to clear infections is described as good, however he has not had recent infections  Parents and patient perceive cough as strong, he participates in breath stacking twice a day, helps with expectorations  During the daytime he denies dyspnea, he denies difficulties swallowing or reflux    Sleeps well, bedtime is at 10:30 pm, SOL 5 min, sleeps through the night, needs reposition 3 times per night, wakes up 7-7:30 am, hard to get up, wants to sleep longer  Does not nap, 1 soda a day.    Last sleep study done on 10/2/2022   Residual AHI 0.5 events per hour, however no REM supine on the final pressure. Loud snoring, normal respiratory rate and pattern. No sleep associated hypoxemia or hypoventilation.Continue iVAPS 16/4.8/5/4/20/1.5/0.8/300 cmH2O    Allergies  Allergies as of 10/11/2024    (No Known  "Allergies)     Current Outpatient Medications   Medication Sig Dispense Refill    acetaminophen (TYLENOL) 325 MG tablet Take 2 tablets (650 mg) by mouth every 6 hours as needed for mild pain or fever 1 Bottle 0    Ascorbic Acid (VITAMIN C PO) Take 3 tablets by mouth daily      CALCIUM ANTACID 500 MG chewable tablet 1 chew tab 2 times daily  0    calcium carbonate 500 mg, elemental, (OSCAL) 500 MG tablet TAKE 2 TABLETS (1,000 MG) BY MOUTH 2 TIMES DAILY 120 tablet 5    carvedilol (COREG) 25 MG tablet Take 1 tablet (25 mg) by mouth 2 times daily (with meals) 60 tablet 11    Cholecalciferol (VITAMIN D3) 50 MCG (2000 UT) CAPS Take 2 capsules by mouth daily 60 capsule 5    enalapril (VASOTEC) 10 MG tablet Take 1 tablet (10 mg) by mouth 2 times daily 60 tablet 11    eplerenone (INSPRA) 25 MG tablet Take 1 tablet (25 mg) by mouth daily 90 tablet 3    FLUoxetine (PROZAC) 10 MG capsule Take 10 mg by mouth daily      order for DME Sling for Micah Lift. 1 Units 0    predniSONE (DELTASONE) 20 MG tablet TAKE 5 TABLETS BY MOUTH TWICE WEEKLY 40 tablet 5       Past medical history, surgical history and family history reviewed with patient/parent today, no changes.      RoS  A comprehensive review of systems was performed and is negative except as noted in the HPI.      Objective:     Physical Exam  BP (!) 85/54 (BP Location: Right arm, Patient Position: Sitting, Cuff Size: Adult Regular)   Pulse 64   Resp 16   SpO2 99%   Ht Readings from Last 2 Encounters:   04/18/24 5' 9\" (175.3 cm) (42%, Z= -0.21)*   02/23/24 5' 6.93\" (170 cm) (17%, Z= -0.94)*     * Growth percentiles are based on CDC (Boys, 2-20 Years) data.     Wt Readings from Last 2 Encounters:   04/18/24 212 lb (96.2 kg) (96%, Z= 1.71)*   02/23/24 191 lb 2.2 oz (86.7 kg) (89%, Z= 1.22)*     * Growth percentiles are based on CDC (Boys, 2-20 Years) data.     BMI %: > 36 months -  No height and weight on file for this encounter.    Constitutional:  No distress, comfortable, " pleasant. Sitting in motorized wheelchair in no acute distress  Vital signs:  Reviewed and normal.  Eyes:  Anicteric, normal extra-ocular movements.  Ears, Nose and Throat:   No rhinorrhea.  Good palatal elevation.  Throat was clear..  Neck:  Supple with full range of motion, no lymphadenopathy.  Cardiovascular:   Normal S1 and S2.  No gallop or murmurs.  Chest:  Symmetrical, no retractions.  Respiratory: Moderately reduced breath sound amplitude particularly posteriorly at lung bases.  Louder breath sounds anteriorly, but no adventitious sounds heard anywhere.  Gastrointestinal:  Positive bowel sounds, no hepatosplenomegaly, no masses,   Musculoskeletal: No digital clubbing.  Skin: Mild facial acne.  Neurological:  Wheelchair-bound with generalized hypotonia.      Spirometry performed today in the office setting.   The results of this test does not meet the ATS standards for acceptability and repeatability due to end of test criteria not being met.      Results for orders placed or performed in visit on 10/11/24   General PFT Lab (Please always keep checked)    Collection Time: 10/11/24 10:06 AM   Result Value Ref Range    FVC-Pred 4.88 L    FVC-Pre 1.86 L    FVC-%Pred-Pre 38 %    FEV1-Pre 1.06 L    FEV1-%Pred-Pre 25 %    FEV1FVC-Pred 87 %    FEV1FVC-Pre 57 %    FEFMax-Pred 9.86 L/sec    FEFMax-Pre 1.96 L/sec    FEFMax-%Pred-Pre 19 %    FEF2575-Pred 4.68 L/sec    FEF2575-Pre 0.50 L/sec    WLA4108-%Pred-Pre 10 %    ExpTime-Pre 6.25 sec    FIFMax-Pre 1.35 L/sec    MEP-Pre 39 cmH2O    MIP-Pre -35 cmH2O    FEV1FEV6-Pred 85 %    FEV1FEV6-Pre 57 %    FEF2575-Post 0.27 L/sec    KYC0809-%Pred-Post 5 %         Spirometry Interpretation:  Severe restriction with minimal improve in lung function since last PFT. There is also a obstructive component  Peak cough flows are low. Post bronchodilator maneuvers requested today  Courtney Ponce MD      Assessment     Juanito is a 19 year-old male with history of Duchenne muscular  dystrophy with the following respiratory concerns:  severe restrictive pattern pulmonary function test slighlty improved than last visit, will continue breath stacking 3 times a day  Airway clearance impairment likely causing obstructive pattern today: will start cough assist 3 times a day during respiratory infection, sick plan reviewed  Nocturnal hypoventilation: continue AVAPS same setting, new mask will be trialled to improve air leak    Plan:     1. Restrictive lung disease due to muscular dystrophy (H)  2. Hypoventilation      Patient Instructions   We will start cough assist 3 times a day with auto track, during illnesses  You can use suction after treatment  When sick you can use continuous pulse oximetry and add an extra cough assist every 30 min as needed for sats under 95%    Continue breath stacking 3 times a day    Continue non invasive AVAPs during sleep  EPAP: 7 cwp  IPAP 10-25 cwp  TV: 550 ml  Rate: 8  New mask fitting to help with large leak    Follow up in 3-4 months    Courtney Ponce MD    Pediatric Department  Division of Pediatric Pulmonology and Sleep Medicine  Pager # [phone]  Email: [email or web address]    Review of external notes as documented elsewhere in note  Review of the result(s) of each unique test - PFT, Peak cough flows, NIV download  Assessment requiring an independent historian(s) - family - parent  Prescription drug management  47 minutes spent by me on the date of the encounter doing chart review, history and exam, documentation and further activities per the note          CC  Jose Finn    Copy to patient  AAMIR MIRNASHEY LAYNE  [address]  [place] [address]

## 2024-10-11 NOTE — NURSING NOTE
"[facility] [[id number]]  Chief Complaint   Patient presents with    RECHECK     MD.     Initial BP (!) 85/54 (BP Location: Right arm, Patient Position: Sitting, Cuff Size: Adult Regular)   Pulse 64   Resp 16   SpO2 99%  Estimated body mass index is 31.31 kg/m  as calculated from the following:    Height as of 4/18/24: 5' 9\" (175.3 cm).    Weight as of 4/18/24: 212 lb (96.2 kg).  Medication Reconciliation: complete    Does the patient need any medication refills today? Yes. Calcium carbonate 500 mg.    Does the patient/parent need MyChart or Proxy acces today? No    Euthimia Anny, EMT.              "

## 2024-10-11 NOTE — PROGRESS NOTES
"OUTPATIENT PHYSICAL THERAPY CLINIC NOTE    Christopher Gorman                              YOB: 2005  [id number]     Type of visit:                                                                              Evaluation            Date of service: 10/11/2024      Referring provider: Dr Tray Cavazos      present: No  Language: English     Others present at visit:  Parent(s) - mother and father   Sibling - Holger (also has DMD)     Medical diagnosis:   Duchenne Muscular dystrophy (DMD)      Pertinent medical history: Christopher has returned to [facility] today for 6 mo follow up visit. Overall, things are going well, they have no new concerns. He continues to stretch and stand via use of PWC 3x/day. He needs to be reminded to stand and tilt for pressure relief. He received his sleep system and has been using some of the pillows for positioning. He found the lateral wedges too uncomfortable to use.      Cardio-respiratory status:  FVC 38% predicted     Height/Weight: 5' 9\" / 212 lbs     Living environment:  House - 3 stairs to enter (ramp); 14 steps inside the house (does not access); bedroom and bathroom on main level       Current assistance/living environment:  Lives with parents and brother.      Current mobility equipment:  [facility] - Permobil F5 (spring 2023; [employer])               Ceiling lift - bedroom <> bathroom   Modified van       Current ADL equipment:  Fixed shower chair   Able to sit independently on the toilet - needs assist to wipe    Standard queen bed with memory foam topper (sleeps on L side and back; needs to be turned 2-4x/night)   No orthotics  Simple stuff works sleep system      Technology used: computer, phone     Patient concerns/goals: No concerns, no questions.     Evaluation              Interview completed.                Pain assessment: None                Range of motion: tested while seated in PWC                          Soleus  -10 degrees B                       "     Knee extension short by 50 deg B, R LE slightly tighter than L                            Manual muscle testing:    Joint/body area Motion Left Right   Neck Flexion 3- --    Shoulder Flexion 2 2      Abduction 2 2   Elbow Flexion 3- 2+      Extension 2 2   Wrist Flexion 4- 4-    Extension 4- 4-   Tip pinch HHD (lbs) 1.3 1.5   Pad pinch HHD (lbs) 2.0 3.1   Key pinch HHD (lbs) 3.8 4.4    HHD (lbs) 10.1 14.8   Hip Flexion 1 1   Knee Flexion 2 2      Extension 2- 2-   Ankle Dorsiflexion 2 2      Plantarflexion 3- 3-                            Gait:  Non ambulatory since Fall 2016                          Cognition:  WFL - not formally tested.  9 Hole Peg Test     Dominant hand: 50.4 seconds (R hand)   Non-dominant hand: 51.4 seconds (L hand)      Normative Values    Age Dominant Non-dominant   4-5 29.8 34.5   6-7 25.5 28.5   8-9 19.9 21.7   10-11 18.9 20.2   12-13 18.0 18.4   14-15 18.0 18.6   16-17 16.9 17.1   18-19 16.1 16.7     Anitha LOZANO, et al. Measuring dexterity in children using the Nine-hole Peg Test. Journal of Hand Therapy. 2005;18(3):348-351.     Juliana Upper Extremity Rating Scale: 4 - with compensation  Starting with arms at the sides, the patient can abduct the arms in a full [place] until they touch above the head.   Can raise arms above head only by flexing the elbow (shortening the circumference of the movement) or using accessory muscles.  Cannot raise hands above head, but can raise an 8-oz glass of water to the mouth.  Can raise hands to the mouth, but cannot raise an 8-oz glass of water to the mouth.  Cannot raise hands to the mouth, but can use hands to hold a pen or  pennies from the table.  Cannot raise hands to the mouth and has no useful function of hands.    Vignos Functional Rating Scale for Muscular Dystrophy: 9  1. Walks and climbs stairs without assistance  2. Walks and climbs stairs with aid of railing  3. Walks and climbs stairs slowly with aid of railing [over 12 seconds  for four standard stairs]  4. Walks unassisted and raises from chair but cannot climb stairs  5. Walks unassisted but cannot rise from chair or climb stairs  6. Walks only with assistance or walks independently with long leg braces  7. Walks in long leg braces but requires assistance for balance  8. Stands in long leg braces but is unable to walk even with assistance  9. Is in wheelchair  10. Is confined to bed    Egen Klassifikation Scale Version 2    Item Score   1. Ability to use wheelchair 2   2. Ability to transfer from wheelchair 2   3. Ability to stand 2   4. Ability to balance in the wheelchair 2   5. Ability to move the arms 1   6. Ability to use the hands and arms for eating 0   7. Ability to turn in bed 3   8. Ability to cough 0   9. Ability to speak 0   10. Physical well-being (respiratory insufficiency) 0   11. Daytime fatigue 0   12. Head control 0   13. Ability to control joystick 0   14. Food textures 1   15. Eating a meal 1   16. Swallowing 0   17. Hand function 1    TOTAL SCORE 15 / 51     Notes:   - A higher score reflects a lower level of functional ability  - Items on the scale are scored according to the best an individual has done in the last two weeks, especially if there is variation between good days and bad days.     References:   Jessica LEONARDO, Valorie S, Violeta S, Сергей E. Validity of the EK scale: a functional assessment of non-ambulatory individuals with Duchenne muscular dystrophy or spinal muscular atrophy. Physiotherapy Research International, 6(3 119-134, 2001.  Jessica PEREYRA, Valorie RACHEL, Antonio J, Сергей E. Reliability of the EK Scale, a Functional Test for Non-ambulatory Persons with Duchenne Dystrophy. Advances in Physiotherapy 2002; 4:37-47.  Jessica PEREYRA, Camden QUINONES, Alvino QUINONES, et al. Egen Classification revisited in SMA. Neromuscul Disord 2008; 18: 740-41.      Fall Risk Screen:   Has the patient fallen 2 or more times in the last year? No              Has the patient fallen  and had an injury in the past year? No              Timed Up and Go Score: Not able to perform due to non ambulatory   Is the patient a fall risk? Yes, department fall risk interventions implemented          Impairments:  Fatigue  Muscle atrophy  Balance  Range of motion  Skin integrity      Treatment diagnosis:  Impaired mobility  Impaired activities of daily living     Clinical Presentation: Evolving/Changing  Clinical Presentation Rationale: Progressive nature of DMD.  Clinical Decision Making (Complexity): Low complexity      Recommendations/Plan of care:  Patient would benefit from interventions to enhance safety and independence.  Rehab potential good for stated goals.              Evaluation only     Goals:    Target date: 10/11/2024   Patient, family and/or caregiver will verbalize understanding of evaluation results and implications for functional performance.  Patient, family and/or caregiver will verbalize/demonstrate understanding of home program.      Educational assessment/barriers to learning:   No barriers noted                Treatment provided this date:               Discussed results of evaluation with regard to impairments and functional performance and compared them to previous visit.   HEP review: Encouraged daily standing (3x/day or more), increased frequency of tilt for pressure reduction, compliance with daily stretching and deep breathing.      Response to treatment/recommendations:  Parents and pt demo understanding.     Goal attainment:  All goals met                Risks and benefits of evaluation/treatment have been explained.  Patient, family and/or caregiver are in agreement with Plan of Care.                Timed Code Treatment Minutes: 0  Total Treatment Time (sum of timed and untimed services): 15     Signature:   Adeline Smith PT, DPT  Physical Therapist  [facility]  [address], [address] , [place]  [address]  Phone: [phone]  Fax: [phone]  Email: [email or web address]    Date: 10/11/2024      Certification  RESERVE [facility], Medicaid:  Onset date: 10/11/2024   Start of care date: 10/11/2024   Certification date from 10/11/2024 to 10/11/2024      I CERTIFY THE NEED FOR THESE SERVICES FURNISHED UNDER                   THIS PLAN OF TREATMENT AND WHILE UNDER MY CARE     (Physician co-signature of this document indicates review and certification of the therapy plan).

## 2024-10-11 NOTE — PROGRESS NOTES
Good patient effort and cooperation. Height and weight taken from [facility]. Results of testing appear to be valid, although ATS was not met. Pre-test Sp02: 100%. Pre-test HR: 69 bpm. Patient left PFT lab in no distress. ETCO2 = 36 mm Hg (previous 36 mm Hg) PCF = 140 LPM (previous 250 LPM) New single patient use peak flow meter used for PCF, could explain drop from last visit.    Elizabeth Rodriges, RT on 10/11/2024 at 10:28 AM

## 2024-10-14 NOTE — NURSING NOTE
Notifed [facility] of new orders for cough assist, suction machine, and pulse oximeter. Notified [facility] of need to schedule mask re-fit due to large air leak.    Discussed transition to adult care with Christopher. He would like to stay within [facility] once he transitions to the adult clinic. Goal established to transfer to adult care within 2 years.

## 2024-10-17 ENCOUNTER — TELEPHONE (OUTPATIENT)
Dept: PULMONOLOGY | Facility: CLINIC | Age: 19
End: 2024-10-17

## 2024-10-17 NOTE — TELEPHONE ENCOUNTER
Health Call Center    Phone Message    May a detailed message be left on voicemail: yes     Reason for Call: Other: Fax ; Orders     [facility] is calling to follow up on a fax that was resent due to not received. He is hoping the medication justification form was received now and would like a call back to confirm.     [facility] also stated that the order for the pulse oximeter they would not be able to do because the patient is not on any oxygen. Patient would need to be on O2 to be able to get the pulse ox.     Action Taken: Message routed to:  Other: [facility]    Travel Screening: Not Applicable

## 2024-10-17 NOTE — TELEPHONE ENCOUNTER
[facility] called and LM on [facility] regarding cough assist DME order. They were sending over a medical justification form for Dr. Ponce to fill and sign so they can start on PA process.    [facility] called back [facility] to provide fax number for  pulm team ([phone]), form not received in [place]. Callback number for [facility] if form not received: [phone]    Sent message to  pulm team to update.

## 2024-10-18 NOTE — TELEPHONE ENCOUNTER
Called and spoke with Nguyễn at [facility]. Per Christopher's insurance, Christopher does not meet criteria for pulse oximeter. Insurance requires patient be on oxygen. Requested PA be initiated when it is denied, Dr. Ponce will move through the appeal process.    Requested medical justification form be faxed to [facility] at [phone]. Form completed and faxed back to [facility].

## 2024-10-23 ENCOUNTER — VIRTUAL VISIT (OUTPATIENT)
Dept: PEDIATRIC NEUROLOGY | Facility: CLINIC | Age: 19
End: 2024-10-23
Attending: PSYCHIATRY & NEUROLOGY
Payer: COMMERCIAL

## 2024-10-23 DIAGNOSIS — G71.01 CARDIOMYOPATHY IN DUCHENNE MUSCULAR DYSTROPHY (H): ICD-10-CM

## 2024-10-23 DIAGNOSIS — I43 CARDIOMYOPATHY IN DUCHENNE MUSCULAR DYSTROPHY (H): ICD-10-CM

## 2024-10-23 DIAGNOSIS — R26.2 UNABLE TO AMBULATE: ICD-10-CM

## 2024-10-23 DIAGNOSIS — G71.01 DUCHENNE MUSCULAR DYSTROPHY (H): Primary | ICD-10-CM

## 2024-10-23 PROCEDURE — 99214 OFFICE O/P EST MOD 30 MIN: CPT | Mod: 95 | Performed by: PSYCHIATRY & NEUROLOGY

## 2024-10-23 NOTE — TELEPHONE ENCOUNTER
Received update mask fit, cough assist and suction training is scheduled 10/29/24.    Oximeter is still pending insurance coverage.

## 2024-10-23 NOTE — PROGRESS NOTES
Pediatric Neuromuscular Clinic      Christopher Gorman MRN# [id number]   YOB: 2005 Age: 19 year old      Date of Visit: Oct 23, 2024    Primary care provider: Jose Finn      History is obtained from the patient, family and medical record       Interval Change:      Christopher Gorman is a 19 year old male was seen and examined at the pediatric neuromuscular clinic on Oct 23, 2024 for a follow up evaluation of previously diagnosed DMD. He reports no new issues or concerns. He reports no aches or pains. He reports no edema no shortness of breath. He is non-ambulatory and uses a PWC full time. He reports no cardiac symptoms. He continues with respiratory therapies such as breath stacking.    He is transferred via a cheng lift.   He denies depression or anxiety as he continues on low dose of fluoxetine.  Currently is taking prednisone, vitamin D, Vitamin C, calcium, and heart medications.  Denies any side effects.  Denies any new shortness of breath.  Does have baseline neuromuscular weakness with breathing overnight and uses BiPAP consistently.  Denies any GI concerns, otherwise sleeping well.  He graduated from high school last year and has been at home with his parents.               Immunizations:     Immunization History   Administered Date(s) Administered    Influenza Vaccine >6 months,quad, PF 12/12/2014    Pneumococcal 23 valent 12/12/2014            Allergies:    No Known Allergies          Medications:     Prescription Medications as of 10/23/2024         Rx Number Disp Refills Start End Last Dispensed Date Next Fill Date Owning Pharmacy    acetaminophen (TYLENOL) 325 MG tablet  1 Bottle 0 7/19/2019 --   [place], [place] - [address]    Sig: Take 2 tablets (650 mg) by mouth every 6 hours as needed for mild pain or fever    Class: No Print Out    Route: Oral    Ascorbic Acid (VITAMIN C PO)  -- --  --       Sig: Take 3 tablets by mouth daily     Class: Historical    Route: Oral    CALCIUM ANTACID 500 MG chewable tablet  -- 0 7/3/2019 --   [facility]    Si chew tab 2 times daily    Class: Historical    calcium carbonate 500 mg, elemental, (OSCAL) 500 MG tablet  120 tablet 5 5/15/2024 --   [facility]    Sig: TAKE 2 TABLETS (1,000 MG) BY MOUTH 2 TIMES DAILY    Class: E-Prescribe    Route: Oral    carvedilol (COREG) 25 MG tablet  60 tablet 11 2024 --   [facility]    Sig: Take 1 tablet (25 mg) by mouth 2 times daily (with meals)    Class: E-Prescribe    Route: Oral    Cholecalciferol (VITAMIN D3) 50 MCG ( UT) CAPS  60 capsule 5 2020 --   [facility] - [place]    Sig: Take 2 capsules by mouth daily    Class: E-Prescribe    Route: Oral    enalapril (VASOTEC) 10 MG tablet  60 tablet 11 2024 --   [facility] - [place]    Sig: Take 1 tablet (10 mg) by mouth 2 times daily    Class: E-Prescribe    Route: Oral    eplerenone (INSPRA) 25 MG tablet  90 tablet 3 2024 --   [facility] - [place]    Sig: Take 1 tablet (25 mg) by mouth daily    Class: E-Prescribe    Route: Oral    FLUoxetine (PROZAC) 10 MG capsule  -- --  --   [facility] - [place]    Sig: Take 10 mg by mouth daily    Class: Historical    Route: Oral    order for DME  1 Units 0 2017 --   [facility] - [place]    Sig: Sling for Micah Lift.    Class: Local Print    predniSONE (DELTASONE) 20 MG tablet  40 tablet 5 2024 --   [facility] - [place]    Sig: TAKE 5 TABLETS BY MOUTH TWICE WEEKLY    Class: E-Prescribe                   Physical Exam:     There were no vitals taken for this visit.   Physical Exam:   General: NAD  Extremities: Warm, dry  Neurologic:   Mental Status Exam:  Alert, awake and easily engaged in interaction.   Cranial Nerves: PERRLA, EOMs intact, no nystagmus, facial movements symmetric.    Motor:  He is non-ambulatory seating in a PWC. Able to use his hands to drive his PWC.             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 Stable cardiomyopathy with normal cardiac function. Asymptomatic restrictive lung disease.     Given that patient has been stable over the past 1 year, we will continue with the current treatment plan of prednisone 100 mg twice weekly with vitamin D and calcium.     Discussed novel treatment recently approved by FDA such as givinostat, vamorolone and elevidys.     Recommendations:   - Continue prednisone 100 mg twice weekly  - Continue vitamin D and calcium  - Continue to follow up with cardiology  - Return to clinic in 6 months or sooner if necessary    Christopher Gorman 19 year old is  who is being evaluated via a billable video visit.    Video Start Time: 9:35 AM  Video End Time: 9:54 PM  Originating Location (pt. Location): Home  Distant Location (provider location):  [facility]   Platform used for Video Visit: Heriberto  I have spent at least 25min on the date of the encounter in chart review, patient visit, review of tests,   counseling the patient and their caregivers, documentation about the issues documented above.   See note for details.    Sincerely,        Tray Cavazos MD  Pediatric neurology and neuromuscular medicine  [phone]

## 2024-10-23 NOTE — LETTER
10/23/2024      RE: Christopher Gorman  [address]  [place] [address]     Dear Colleague,    Thank you for the opportunity to participate in the care of your patient, Christopher Gorman, at the [facility]AT[facility] at [facility]. Please see a copy of my visit note below.                 Pediatric Neuromuscular Clinic      Christopher Gorman MRN# [id number]   YOB: 2005 Age: 19 year old      Date of Visit: Oct 23, 2024    Primary care provider: Jose Finn      History is obtained from the patient, family and medical record       Interval Change:      Christopher Gorman is a 19 year old male was seen and examined at the pediatric neuromuscular clinic on Oct 23, 2024 for a follow up evaluation of previously diagnosed DMD. He reports no new issues or concerns. He reports no aches or pains. He reports no edema no shortness of breath. He is non-ambulatory and uses a PWC full time. He reports no cardiac symptoms. He continues with respiratory therapies such as breath stacking.    He is transferred via a cheng lift.   He denies depression or anxiety as he continues on low dose of fluoxetine.  Currently is taking prednisone, vitamin D, Vitamin C, calcium, and heart medications.  Denies any side effects.  Denies any new shortness of breath.  Does have baseline neuromuscular weakness with breathing overnight and uses BiPAP consistently.  Denies any GI concerns, otherwise sleeping well.  He graduated from high school last year and has been at home with his parents.               Immunizations:     Immunization History   Administered Date(s) Administered     Influenza Vaccine >6 months,quad, PF 12/12/2014     Pneumococcal 23 valent 12/12/2014            Allergies:    No Known Allergies          Medications:     Prescription Medications as of 10/23/2024         Rx Number Disp Refills Start End Last Dispensed Date Next Fill Date Owning  Pharmacy    acetaminophen (TYLENOL) 325 MG tablet  1 Bottle 0 2019 --   [facility] [place], [place] - [address]    Sig: Take 2 tablets (650 mg) by mouth every 6 hours as needed for mild pain or fever    Class: No Print Out    Route: Oral    Ascorbic Acid (VITAMIN C PO)  -- --  --       Sig: Take 3 tablets by mouth daily    Class: Historical    Route: Oral    CALCIUM ANTACID 500 MG chewable tablet  -- 0 7/3/2019 --   [facility] - [place]    Si chew tab 2 times daily    Class: Historical    calcium carbonate 500 mg, elemental, (OSCAL) 500 MG tablet  120 tablet 5 5/15/2024 --   [facility] - [place]    Sig: TAKE 2 TABLETS (1,000 MG) BY MOUTH 2 TIMES DAILY    Class: E-Prescribe    Route: Oral    carvedilol (COREG) 25 MG tablet  60 tablet 11 2024 --   [facility] - [place]    Sig: Take 1 tablet (25 mg) by mouth 2 times daily (with meals)    Class: E-Prescribe    Route: Oral    Cholecalciferol (VITAMIN D3) 50 MCG (2000 UT) CAPS  60 capsule 5 2020 --   [facility] - [place]    Sig: Take 2 capsules by mouth daily    Class: E-Prescribe    Route: Oral    enalapril (VASOTEC) 10 MG tablet  60 tablet 11 2024 --   [facility] - [place]    Sig: Take 1 tablet (10 mg) by mouth 2 times daily    Class: E-Prescribe    Route: Oral    eplerenone (INSPRA) 25 MG tablet  90 tablet 3 2024 --   [facility] - [place], [address]    Sig: Take 1 tablet (25 mg) by mouth daily    Class: E-Prescribe    Route: Oral    FLUoxetine (PROZAC) 10 MG capsule  -- --  --   [facility] - [place]    Sig: Take 10 mg by mouth daily    Class: Historical    Route: Oral    order for DME  1 Units 0 2017 --   [facility] [place] - [address]    Sig: Sling for Micah Lift.    Class: Local Print     predniSONE (DELTASONE) 20 MG tablet  40 tablet 5 7/22/2024 --   [facility] - [place], [place] - [address]    Sig: TAKE 5 TABLETS BY MOUTH TWICE WEEKLY    Class: E-Prescribe                   Physical Exam:     There were no vitals taken for this visit.   Physical Exam:   General: NAD  Extremities: Warm, dry  Neurologic:   Mental Status Exam: Alert, awake and easily engaged in interaction.   Cranial Nerves: PERRLA, EOMs intact, no nystagmus, facial movements symmetric.    Motor:  He is non-ambulatory seating in a PWC. Able to use his hands to drive his PWC.             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 Stable cardiomyopathy with normal cardiac function. Asymptomatic restrictive lung disease.     Given that patient has been stable over the past 1 year, we will continue with the current treatment plan of prednisone 100 mg twice weekly with vitamin D and calcium.     Discussed novel treatment recently approved by FDA such as givinostat, vamorolone and elevidys.     Recommendations:   - Continue prednisone 100 mg twice weekly  - Continue vitamin D and calcium  - Continue to follow up with cardiology  - Return to clinic in 6 months or sooner if necessary    Christopher Gorman 19 year old is  who is being evaluated via a billable video visit.    Video Start Time: 9:35 AM  Video End Time: 9:54 PM  Originating Location (pt. Location): Home  Distant Location (provider location):  [facility]   Platform used for Video Visit: Heriberto DEJESUS have spent at least 25min on the date of the encounter in chart review, patient visit, review of tests,   counseling the patient and their caregivers, documentation about the issues documented above.   See note for  details.    Sincerely,        Tray Cavazos MD  Pediatric neurology and neuromuscular medicine  [phone]           Please do not hesitate to contact me if you have any questions/concerns.     Sincerely,       Tray Cavazos MD

## 2024-11-13 LAB
ATRIAL RATE - MUSE: 60 BPM
DIASTOLIC BLOOD PRESSURE - MUSE: NORMAL MMHG
INTERPRETATION ECG - MUSE: NORMAL
P AXIS - MUSE: 27 DEGREES
PR INTERVAL - MUSE: 122 MS
QRS DURATION - MUSE: 82 MS
QT - MUSE: 384 MS
QTC - MUSE: 384 MS
R AXIS - MUSE: 59 DEGREES
SYSTOLIC BLOOD PRESSURE - MUSE: NORMAL MMHG
T AXIS - MUSE: 16 DEGREES
VENTRICULAR RATE- MUSE: 60 BPM

## 2024-11-22 ENCOUNTER — VIRTUAL VISIT (OUTPATIENT)
Dept: PEDIATRIC NEUROLOGY | Facility: CLINIC | Age: 19
End: 2024-11-22
Payer: COMMERCIAL

## 2024-11-22 DIAGNOSIS — I43 CARDIOMYOPATHY IN DUCHENNE MUSCULAR DYSTROPHY (H): ICD-10-CM

## 2024-11-22 DIAGNOSIS — G71.01 CARDIOMYOPATHY IN DUCHENNE MUSCULAR DYSTROPHY (H): ICD-10-CM

## 2024-11-22 PROCEDURE — 99214 OFFICE O/P EST MOD 30 MIN: CPT | Mod: 95 | Performed by: PEDIATRICS

## 2024-11-22 NOTE — NURSING NOTE
Christopher Gorman complains of    Chief Complaint   Patient presents with    RECHECK     Follow-up         Patient would like the video invitation sent by: Other e-mail: Mychart      Patient is located in [place]? Yes     I have reviewed and updated the patient's medication list, allergies and preferred pharmacy.      Joelle Vazquez MA

## 2024-11-22 NOTE — PROGRESS NOTES
[facility]  Pediatric Cardiology  Visit Note    2024    RE: Christopher Gorman  : 2005  MRN: [id number]    Dear Dr. Finn,    I had the pleasure of evaluating Christopher Gorman in the [facility] on 2024 for routine follow-up evaluation. He presented to clinic via virtual visit with his mother, who served as an independent historian. As you remember, Christopher is a 19 year old male with Duchenne muscular dystrophy. He was followed by my colleague, Dr. Selene Campos, for several years. He has had no evidence of cardiac dysfunction; however, enalapril was started prophylactically for Duchenne-related cardiomyopathy in 2016. In 2019, he continued to have resting tachycardia, which is most likely secondary to Duchenne-related autonomic dysfunction, so carvedilol was started and titrated. He has a history of non-cardiac chest pain that has not been associated with concerning cardiac symptoms. He has a history of dependent edema of his lower extremities. From a pulmonary perspective, he has used AVAPS at night for obstructive sleep apnea and restrictive lung disease. He has tolerated this well. From a neurologic perspective, he has been on prednisone for myopathy, is non-ambulatory and is dependent on a power chair.     Since his last visit with me in 2024, he has done well. He denies chest pain, palpitations, dizziness/lightheadedness, syncope or shortness of breath. He generally stays well-hydrated. He reports taking his cardiac medication, as directed.    A comprehensive review of systems was performed and is negative except as noted in the HPI.    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    Social  History  Lives with parents and younger brother in [place], [place].    Medications  Current Outpatient Medications   Medication Sig Dispense Refill    acetaminophen (TYLENOL) 325 MG tablet Take 2 tablets (650 mg) by mouth every 6 hours as needed for mild pain or fever 1 Bottle 0    Ascorbic Acid (VITAMIN C PO) Take 3 tablets by mouth daily      CALCIUM ANTACID 500 MG chewable tablet 1 chew tab 2 times daily  0    calcium carbonate 500 mg, elemental, (OSCAL) 500 MG tablet TAKE 2 TABLETS (1,000 MG) BY MOUTH 2 TIMES DAILY 120 tablet 5    carvedilol (COREG) 25 MG tablet Take 1 tablet (25 mg) by mouth 2 times daily (with meals) 60 tablet 11    Cholecalciferol (VITAMIN D3) 50 MCG (2000 UT) CAPS Take 2 capsules by mouth daily 60 capsule 5    enalapril (VASOTEC) 10 MG tablet Take 1 tablet (10 mg) by mouth 2 times daily 60 tablet 11    eplerenone (INSPRA) 25 MG tablet Take 1 tablet (25 mg) by mouth daily 90 tablet 3    FLUoxetine (PROZAC) 10 MG capsule Take 10 mg by mouth daily      order for DME Sling for Micah Lift. 1 Units 0    predniSONE (DELTASONE) 20 MG tablet TAKE 5 TABLETS BY MOUTH TWICE WEEKLY 40 tablet 5     No current facility-administered medications for this visit.     Allergies  No Known Allergies    Laboratory Studies:  Imaging-  Echo (11/22/2024): Technically difficult study due to poor acoustic windows. Normal right and left ventricular size and systolic function. The calculated single biplane left ventricular ejection fraction is 61%. Normal right ventricular systolic pressure. No pericardial effusion.    Echo (9/8/2023): Technically difficult study due to poor acoustic windows. Normal right and left ventricular size and systolic function. The calculated single biplane left ventricular ejection fraction is 63%. Normal right ventricular systolic pressure. No pericardial effusion.    Cardiac MR (9/9/2021):   1. Normal left ventricular function.  2. Gadolinium sequences not obtained because of patient inability to  hold his breath for long enough to perform these series.    Cardiac MR (1/13/2020): normal cardiac MRI; no evidence of fibrosis; LVEF 56%    Electrophysiology-  EKG (2/23/2024): sinus rhythm, possible right ventricular hypertrophy, anterior T-wave inversion    EKG (9/8/2023): sinus rhythm    Holter (8/16-8/18/2022): Sinus rhythm. HR range , average HR 79 bpm. No pauses or blocks. No tachyarrhythmias. Rare (<0.01%) PACs. No ventricular ectopy.    ZioPatch (1/22-1/24/2021): Screening for arrhythmias. Normal diurnal variation. Sinus rhythm predominates with HR , average 87 bpm. No significant pauses or blocks. Rare (<1%) premature atrial contractions. No ventricular ectopy. Patient triggered events totaling one time revealing sinus rhythm. No symptoms reported. Normal 4 day recording.    Assessment:  Patient Active Problem List   Diagnosis    Low bone mineral density    Vitamin D deficiency    Goiter - mild    Duchenne muscular dystrophy (H) deletion of exon 55    Sinus tachycardia    Hypocalcemia    VIVIANE (obstructive sleep apnea)    Restrictive lung disease due to muscular dystrophy (H)    Other osteoporosis without current pathological fracture    Delayed puberty    Cardiomyopathy in Duchenne muscular dystrophy (H)       Christopher is a 19 year old male with Duchenne muscular dystrophy and restrictive lung disease and obstructive sleep apnea now requiring AVAPS use during sleep who continues to have normal cardiac function on prophylactic carvedilol, enalapril and eplerenone. There is an emerging body of evidence that a prophylactic heart failure medication can prevent Duchenne-related cardiomyopathy. He has had sinus tachycardia, which is consistent Duchenne-related autonomic dysfunction that may be detrimental to heart function over time. Carvedilol appears to be helping with this. His cardiac function was normal today and is prophylactic HF medications. He has not had any cardiac symptoms and we will continue  the medications at the same dosage.       Plan:  - continue the following medication:  enalapril 10 mg PO BID for delaying progression of Duchenne cardiomyopathy  carvedilol 25 mg PO BID for tachycardia from Duchenne-related autonomic dysfunction for delaying progression of Duchenne cardiomyopathy  eplerenone 50 mg PO daily  - will forgo Holter monitor due to adhesive allergy  - will forgo cardiac MRIs due to difficulty performing breath-holds  - CMP, cystatin-C, NT-pro BNP and troponin I every Winter  - BPs should be obtained from the left arm preferentially    Activity Restriction: none  SBE prophylaxis: NOT indicated    Follow-up: in 6 months for clinic visit with EKG and echocardiogram    Thank you for allowing me to participate in Christopher's care. Please contact me with questions or concerns.    Navin Thompson MD   Pediatric Cardiologist    Sincerely,    Peter Bright MD    Division of Pediatric Cardiology  Department of Pediatrics  [facility]    CC:   Patient Care Team:  Jose Finn as PCP - General (Family Medicine)  Tray Cavazos MD as MD (Pediatric Neurology)  Selene Campos MD as MD (Pediatric Cardiology)  Jose Finn (Family Practice)  Melanie Delatorre MD as MD (INTERNAL MEDICINE - ENDOCRINOLOGY, DIABETES & METABOLISM)  Courtney Martinez MD as MD (Pediatric Pulmonology)  Tray Cavazos MD as Assigned Neuroscience Provider  Rachel Romero RN as Registered Nurse  Deangelo, Peter Romero MD as Assigned Pediatric Specialist Provider  All Johnson MD as Assigned Musculoskeletal Provider     Virtual visit was combined with his brother, Holger's visit  Virtual visit start time: 1602 hrs  Virtual visit end time: 1606 hrs  Total time: 4 min + 15 minutes for chart review and documentation

## 2024-12-03 NOTE — PATIENT INSTRUCTIONS
Breath stacking 3 times a day  Continue AVAPS   Replace mask every 6 months  Follow up in 6 months    Courtney Ponce MD    Pediatric Department  Division of Pediatric Pulmonology and Sleep Medicine  Pager # [phone]  Email: [email or web address]  
[phone]

## 2024-12-04 DIAGNOSIS — M85.80 OSTEOPENIA, UNSPECIFIED LOCATION: ICD-10-CM

## 2024-12-04 RX ORDER — CALCIUM CARBONATE 500(1250)
2 TABLET ORAL 2 TIMES DAILY
Qty: 120 TABLET | Refills: 5 | Status: SHIPPED | OUTPATIENT
Start: 2024-12-04

## 2025-01-02 DIAGNOSIS — G71.01 CARDIOMYOPATHY IN DUCHENNE MUSCULAR DYSTROPHY (H): ICD-10-CM

## 2025-01-02 DIAGNOSIS — I43 CARDIOMYOPATHY IN DUCHENNE MUSCULAR DYSTROPHY (H): ICD-10-CM

## 2025-01-02 NOTE — TELEPHONE ENCOUNTER
1. Refill request received from: [facility]  2. Medication Requested: Eplerenone 25 MG Tablet  3. Directions:Take 1 tablet (25MG)  by mouth daily  4. Quantity:30  5. Last Office Visit: 11/22/24                    Has it been over a year since the last appointment (6 months for diabetes)? No                    If No:     Move on to next question.                    If Yes:                      Change refill quantity to 1 month.                      Route to Provider or Pool & let them know its been over a year since patient has been seen.                      If they do not have an upcoming appointment- reach out to family to schedule or route to .  6. Next Appointment Scheduled for: 5/23/24  7. Last refill: 12/4/24  8. Sent To: PROVIDER

## 2025-01-16 DIAGNOSIS — G71.01 DUCHENNE MUSCULAR DYSTROPHY (H): ICD-10-CM

## 2025-01-16 RX ORDER — PREDNISONE 20 MG/1
TABLET ORAL
Qty: 40 TABLET | Refills: 4 | Status: SHIPPED | OUTPATIENT
Start: 2025-01-16

## 2025-01-16 NOTE — TELEPHONE ENCOUNTER
Patient last seen by Dr. Cavazos on 10/23/24. Follow-up scheduled for 5/23/25. Refills sent per nursing protocol.

## 2025-01-28 RX ORDER — CARVEDILOL 25 MG/1
25 TABLET ORAL 2 TIMES DAILY WITH MEALS
Qty: 180 TABLET | Refills: 3 | Status: SHIPPED | OUTPATIENT
Start: 2025-01-28

## 2025-01-28 RX ORDER — EPLERENONE 25 MG/1
25 TABLET, FILM COATED ORAL DAILY
Qty: 90 TABLET | Refills: 3 | Status: SHIPPED | OUTPATIENT
Start: 2025-01-28

## 2025-01-28 NOTE — TELEPHONE ENCOUNTER
Spoke with [facility]. Refills needed for both eplerenone and carvedilol. Last visit 11/22/24. Next visit 6/13/25. Refills provided per protocol.

## 2025-02-26 ENCOUNTER — TELEPHONE (OUTPATIENT)
Dept: PEDIATRIC NEUROLOGY | Facility: CLINIC | Age: 20
End: 2025-02-26
Payer: COMMERCIAL

## 2025-02-26 DIAGNOSIS — I43 CARDIOMYOPATHY IN DUCHENNE MUSCULAR DYSTROPHY (H): ICD-10-CM

## 2025-02-26 DIAGNOSIS — G71.01 CARDIOMYOPATHY IN DUCHENNE MUSCULAR DYSTROPHY (H): ICD-10-CM

## 2025-02-26 RX ORDER — ENALAPRIL MALEATE 10 MG/1
10 TABLET ORAL 2 TIMES DAILY
Qty: 60 TABLET | Refills: 4 | Status: SHIPPED | OUTPATIENT
Start: 2025-02-26

## 2025-02-26 NOTE — TELEPHONE ENCOUNTER
Prior Authorization Retail Medication Request    Medication/Dose:   Diagnosis and ICD code (if different than what is on RX):  RX  New/renewal/insurance change PA/secondary ins. PA: Renewal    **CoverMyMeds Key** [id number]    Insurance   Primary: [employer]  Insurance ID: [id number]       Pharmacy Information (if different than what is on RX)  Name:  RX    Clinic Information  Preferred routing pool for dept communication: [facility]

## 2025-02-26 NOTE — TELEPHONE ENCOUNTER
Patient last seen by Dr. Bright on 11/22/2024. Follow-up scheduled with Dr. Thompson on 6/13/2025. REfills sent per nursing protocol.

## 2025-03-02 NOTE — TELEPHONE ENCOUNTER
PA Initiation    Medication: EPLERENONE 25 MG PO TABS  Insurance Company: [employer] - Phone [phone] Fax [phone]  Pharmacy Filling the Rx: [id number] - [place], [place] - [address]  Filling Pharmacy Phone: [phone]  Filling Pharmacy Fax: [phone]  Start Date: 3/1/2025

## 2025-03-03 NOTE — TELEPHONE ENCOUNTER
Prior Authorization Approval    Medication: EPLERENONE 25 MG PO TABS  Authorization Effective Date: 3/2/2025  Authorization Expiration Date: 3/1/2026  Approved Dose/Quantity:   Reference #:     Insurance Company: [employer] [phone] Fax [phone]  Expected CoPay: $    CoPay Card Available:      Financial Assistance Needed:   Which Pharmacy is filling the prescription: [id number] - [place], [place] - [address]  Pharmacy Notified: YES  Patient Notified: **Instructed pharmacy to notify patient when script is ready to /ship.**

## 2025-03-27 DIAGNOSIS — G71.01 DUCHENNE MUSCULAR DYSTROPHY (H): Primary | ICD-10-CM

## 2025-04-24 ENCOUNTER — OFFICE VISIT (OUTPATIENT)
Dept: ORTHOPEDICS | Facility: CLINIC | Age: 20
End: 2025-04-24
Payer: COMMERCIAL

## 2025-04-24 DIAGNOSIS — G71.01 DUCHENNE MUSCULAR DYSTROPHY (H): Primary | ICD-10-CM

## 2025-04-24 DIAGNOSIS — M41.45 NEUROMUSCULAR SCOLIOSIS OF THORACOLUMBAR REGION: ICD-10-CM

## 2025-04-24 NOTE — LETTER
4/24/2025      Christopher Gorman  [address]  [place] [address]      Dear Colleague,    Thank you for referring your patient, Christopher Gorman, to the [facility]. Please see a copy of my visit note below.    In-Person Visit    Chief Complaint   Patient presents with     Consult     Former  pt F/U Duchenne muscular dystrophy       Last Visit Date: 4/18/24  Previous Impression:  1. Duchenne muscular dystrophy with mild scoliosis and pelvic obliquity   Previous Plan:  Reassuringly he still has good sitting tolerance. Discussed that at this point there is lower likelihood that he would go on to need surgery. We still recommend annual visit with Xrs to monitor.  Reviewed that Dr. Johnson will be retiring in the next year.  Recommended following up with Dr. Gil in the future.       S>  19 year old male, here for annual visit and transfer of care from Dr. Johnson who just retired; for NM scoliosis 2' to Duchenne muscular dystrophy.  This is my first encounter with patient.    Accompanied by mom.  Over the last year, they have noticed that he sits with more obliquity in his pelvis and has less seated tolerance.  He is also more nervous when he is out of his wheelchair and in on stable setting such as in his shower chair because he feels that he is not as able to sit upright.  He presents with his mother today.  He has a sibling that underwent fusion in the past for the same issue, and she is concerned that his curve has progressed requiring surgery at this time.    The patient reports that he has no pain related to this but does note that he sits with more coronal imbalance    Patient's younger brother Holger, also with similar condition, had similar scoliosis surgery with Dr. Johnson in 2022.  Thus, both mom and patient already know what to expect.  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    Family history:  Positive  for Duchenne muscular dystrophy on mother's side of the family.  Per mom, her brother had DM the but passed away more than 30 years ago.  Her uncle also had it.  As mentioned above, patient's younger brother also has the same condition.    Oswestry (YUNIER) Questionnaire        4/23/2025     7:00 PM   OSWESTRY DISABILITY INDEX   Count 6    Sum 10    Oswestry Score (%) 33.33 %        Patient-reported      YUNIRE 4/23/25 33.33%           PROMIS-10 Scores  Global Mental Health Score: (Patient-Rptd) (P) 14  Global Physical Health Score: (Patient-Rptd) (P) 13  PROMIS TOTAL - SUBSCORES: (Patient-Rptd) (P) 27    Physical Examination:    Alert, oriented x 3, cooperative.  Not in CP distress.  There were no vitals taken for this visit.  Ambulates independently.   Grossly neurologically intact.  Detailed exam not performed today; please see previous note.    Imaging:      The image below is from today's encounter dated 4/20/25 with an increase from approximately 33 degrees measured on x-ray dated 4/18/2024 to 45 degrees in 1 years time        Assessment:    19-year-old male  1.  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  2.  Pelvic obliquity.  3.  Lumbosacral transitional segmentation, with partially lumbarized S1 (referred to as L6).    Plan:    We had long discussion regarding this patient's imaging findings.  With rapid progression of his curvature to a maximum of 45 degrees, he meets indication for surgical correction.  We had a long discussion regarding the planned procedure as well as the risks and benefits.  The patient and his mother understand these risks and they both wish to proceed.  The patient's [facility], so the family understands the surgery and recovery process quite well.    Had good long discussion with the patient and mom regarding  his spinal condition.  X-rays have shown progression in curvature, now meeting surgical criteria.  In addition, pelvic obliquity has likewise increased, putting him at increased risk for developing ischial sores/ulcers.  Currently, he does not have such ulcers yet.  I recommend surgery in the form of posterior instrumented spinal fusion from proximal thoracic spine down to pelvis, with goal of improving coronal alignment, improving sitting balance on a more level pelvis.  Discussed rationale, risks, benefits and alternatives.  Discussed anticipated postsurgical course and restrictions.  Recommend use of local autograft and crushed cancellous allograft (spinal graft technologies, [employer]).  As they already have a family member who needed the same type of surgery for the same condition, patient and mom both know what to expect with surgery.    -Supine x-ray full spine for preoperative planning to obtained today  -PAC referral  -Case request: Posterior instrumented spinal fusion T2 or T3 down to pelvis; Noble-Malhotra osteotomies T12-L4; use of allograft.  Note: Patient has 6 lumbar vertebrae.  Will plan for ICU admission postoperative.  Because patient is now 19 years old, it will be in the adult ICU.    The patient does not have any untreated, underlying mental health conditions or issues which are a major contributor to their chronic pain.  Nonsmoker.    Attestation:  I (Dr. Antoni Gil - Spine Surgeon) have personally evaluated patient with PGY-4 Esequiel, and agree with findings and plan outlined in the note, which I also edited.  I discussed at length with the patient/family, explained the nature of spinal condition, and formulated workup and/or treatment plan together.  All questions were answered to the best of my ability and to patient's apparent satisfaction.     >45 minutes spent on the date of the encounter doing chart review/review of outside records/review of test results/interpretation of  tests/patient visit/documentation/discussion with other provider(s)/discussion with patient and family.    Antoni Gil MD    Orthopaedic Spine Surgery  Dept Orthopaedic Surgery, [facility]  [phone] office, [phone] pager  [email or web address]      Again, thank you for allowing me to participate in the care of your patient.        Sincerely,        Antoni Gil MD    Electronically signed

## 2025-04-24 NOTE — PROGRESS NOTES
In-Person Visit    Chief Complaint   Patient presents with    Consult     Former  pt F/U Duchenne muscular dystrophy       Last Visit Date: 4/18/24  Previous Impression:  1. Duchenne muscular dystrophy with mild scoliosis and pelvic obliquity   Previous Plan:  Reassuringly he still has good sitting tolerance. Discussed that at this point there is lower likelihood that he would go on to need surgery. We still recommend annual visit with Xrs to monitor.  Reviewed that Dr. Johnson will be retiring in the next year.  Recommended following up with Dr. Gil in the future.       S>  19 year old male, here for annual visit and transfer of care from Dr. Johnson who just retired; for NM scoliosis 2' to Duchenne muscular dystrophy.  This is my first encounter with patient.    Accompanied by mom.  Over the last year, they have noticed that he sits with more obliquity in his pelvis and has less seated tolerance.  He is also more nervous when he is out of his wheelchair and in on stable setting such as in his shower chair because he feels that he is not as able to sit upright.  He presents with his mother today.  He has a sibling that underwent fusion in the past for the same issue, and she is concerned that his curve has progressed requiring surgery at this time.    The patient reports that he has no pain related to this but does note that he sits with more coronal imbalance    Patient's younger brother Holger, also with similar condition, had similar scoliosis surgery with Dr. Johnson in 2022.  Thus, both mom and patient already know what to expect.  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    Family history:  Positive for Duchenne muscular dystrophy on mother's side of the family.  Per mom, her brother had DM the but passed away more than 30 years ago.  Her uncle also had it.  As mentioned above, patient's younger brother also has the same condition.    Oswestry  (YUNIER) Questionnaire        4/23/2025     7:00 PM   OSWESTRY DISABILITY INDEX   Count 6    Sum 10    Oswestry Score (%) 33.33 %        Patient-reported      YUNIER 4/23/25 33.33%           PROMIS-10 Scores  Global Mental Health Score: (Patient-Rptd) (P) 14  Global Physical Health Score: (Patient-Rptd) (P) 13  PROMIS TOTAL - SUBSCORES: (Patient-Rptd) (P) 27    Physical Examination:    Alert, oriented x 3, cooperative.  Not in CP distress.  There were no vitals taken for this visit.  Ambulates independently.   Grossly neurologically intact.  Detailed exam not performed today; please see previous note.    Imaging:      The image below is from today's encounter dated 4/20/25 with an increase from approximately 33 degrees measured on x-ray dated 4/18/2024 to 45 degrees in 1 years time        Assessment:    19-year-old male  1.  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  2.  Pelvic obliquity.  3.  Lumbosacral transitional segmentation, with partially lumbarized S1 (referred to as L6).    Plan:    We had long discussion regarding this patient's imaging findings.  With rapid progression of his curvature to a maximum of 45 degrees, he meets indication for surgical correction.  We had a long discussion regarding the planned procedure as well as the risks and benefits.  The patient and his mother understand these risks and they both wish to proceed.  The patient's brother recently underwent a very similar procedure here at the [facility] with Dr. Johnson, so the family understands the surgery and recovery process quite well.    Had good long discussion with the patient and mom regarding his spinal condition.  X-rays have shown progression in curvature, now meeting surgical criteria.  In addition, pelvic obliquity has likewise increased, putting him at increased risk for developing ischial sores/ulcers.  Currently, he does not have  such ulcers yet.  I recommend surgery in the form of posterior instrumented spinal fusion from proximal thoracic spine down to pelvis, with goal of improving coronal alignment, improving sitting balance on a more level pelvis.  Discussed rationale, risks, benefits and alternatives.  Discussed anticipated postsurgical course and restrictions.  Recommend use of local autograft and crushed cancellous allograft (spinal graft technologies, [employer]).  As they already have a family member who needed the same type of surgery for the same condition, patient and mom both know what to expect with surgery.    -Supine x-ray full spine for preoperative planning to obtained today  -PAC referral  -Case request: Posterior instrumented spinal fusion T2 or T3 down to pelvis; Noble-Malhotra osteotomies T12-L4; use of allograft.  Note: Patient has 6 lumbar vertebrae.  Will plan for ICU admission postoperative.  Because patient is now 19 years old, it will be in the adult ICU.    The patient does not have any untreated, underlying mental health conditions or issues which are a major contributor to their chronic pain.  Nonsmoker.    Attestation:  I (Dr. Antoni Gil - Spine Surgeon) have personally evaluated patient with PGY-4 Esequiel, and agree with findings and plan outlined in the note, which I also edited.  I discussed at length with the patient/family, explained the nature of spinal condition, and formulated workup and/or treatment plan together.  All questions were answered to the best of my ability and to patient's apparent satisfaction.     >45 minutes spent on the date of the encounter doing chart review/review of outside records/review of test results/interpretation of tests/patient visit/documentation/discussion with other provider(s)/discussion with patient and family.    Antoni Gil MD    Orthopaedic Spine Surgery  Dept Orthopaedic Surgery, [facility]  [phone] office,  [phone] pager  [email or web address]

## 2025-05-10 DIAGNOSIS — G71.01 DUCHENNE MUSCULAR DYSTROPHY (H): ICD-10-CM

## 2025-05-11 ENCOUNTER — HEALTH MAINTENANCE LETTER (OUTPATIENT)
Age: 20
End: 2025-05-11

## 2025-05-12 ENCOUNTER — TELEPHONE (OUTPATIENT)
Dept: ORTHOPEDICS | Facility: CLINIC | Age: 20
End: 2025-05-12
Payer: COMMERCIAL

## 2025-05-12 RX ORDER — PREDNISONE 20 MG/1
TABLET ORAL
Qty: 40 TABLET | Refills: 0 | Status: SHIPPED | OUTPATIENT
Start: 2025-05-12

## 2025-05-12 NOTE — TELEPHONE ENCOUNTER
Patient last seen by Dr. Cavazos on 10/23/2024. Follow-up scheduled for 5/23/2025. Refill sent per nursing protocol.

## 2025-05-12 NOTE — TELEPHONE ENCOUNTER
Outgoing call made to patient to schedule surgery with Dr. Gil.    Spoke with: Patient     Reason for Surgical Date: Next available.    Date of Surgery: 8/13/2025    Estimated Arrival time Discussed with Patient:  No    Location of surgery: [facility] [place] [facility]     Pre-Op H&P: Scheduled with PAC on 7/18/2025 11:45 AM.    Imaging: No     Additional Appointments: No     Post-Op Appt Date w/ NP/PA:  N/A     Post-Op Appt Date w/ Surgeon 9/25/2025 at 2:00 PM     Discussed with patient PAC RN will provide arrival time and instructions for surgery at the time of the appointment: Yes    Standard Surgery Packet Sent: Yes 4/24/25  via Received in clinic by RN.       Additional Comments: N/A    Bud Suresh on 5/12/2025 at 11:28 AM

## 2025-05-13 ENCOUNTER — CARE COORDINATION (OUTPATIENT)
Dept: PEDIATRIC NEUROLOGY | Facility: CLINIC | Age: 20
End: 2025-05-13
Payer: COMMERCIAL

## 2025-05-13 DIAGNOSIS — G71.01 DUCHENNE MUSCULAR DYSTROPHY (H): ICD-10-CM

## 2025-05-13 DIAGNOSIS — I43 CARDIOMYOPATHY IN DUCHENNE MUSCULAR DYSTROPHY (H): Primary | ICD-10-CM

## 2025-05-13 DIAGNOSIS — G71.01 CARDIOMYOPATHY IN DUCHENNE MUSCULAR DYSTROPHY (H): Primary | ICD-10-CM

## 2025-05-13 NOTE — PROGRESS NOTES
Standing orders and preparation for 5/23/25 [facility]eds [facility].    30 day download for AVAPS device scanned into media tab.

## 2025-05-14 DIAGNOSIS — G71.01 DUCHENNE MUSCULAR DYSTROPHY (H): Primary | ICD-10-CM

## 2025-05-19 DIAGNOSIS — M85.80 OSTEOPENIA, UNSPECIFIED LOCATION: Primary | ICD-10-CM

## 2025-05-19 DIAGNOSIS — G71.01 DUCHENNE MUSCULAR DYSTROPHY (H): ICD-10-CM

## 2025-05-20 NOTE — CONFIDENTIAL NOTE
FUTURE VISIT INFORMATION      SURGERY INFORMATION:  Date: 8.13.25   Location:[facility] OR   Surgeon:  Antoni Gil MD   Anesthesia Type:  General   Procedure: Posterior instrumented spinal fusion thoracic 2 or 3 to pelvis; Smith-Malhotra osteotomies thoracic 12-lumbar 4; use of allograft.  Note: has 6 lumbar vertebrae.       RECORDS REQUESTED FROM:       Primary Care Provider:Jose Finn Kim H     Pertinent Medical History:  - 7/14/20 Junior     Most recent EKG+ Tracing:  3.2.20 tracing   10.11.24 LEAD     Most recent ECHO:  10.11.24    10.11.24, 2.23.24  Most recent Sleep Study:

## 2025-05-23 ENCOUNTER — HOSPITAL ENCOUNTER (OUTPATIENT)
Dept: CT IMAGING | Facility: CLINIC | Age: 20
Discharge: HOME | End: 2025-05-23
Attending: PEDIATRICS
Payer: COMMERCIAL

## 2025-05-23 ENCOUNTER — OFFICE VISIT (OUTPATIENT)
Dept: PEDIATRIC NEUROLOGY | Facility: CLINIC | Age: 20
End: 2025-05-23
Payer: COMMERCIAL

## 2025-05-23 ENCOUNTER — ANCILLARY PROCEDURE (OUTPATIENT)
Dept: BONE DENSITY | Facility: CLINIC | Age: 20
End: 2025-05-23
Payer: COMMERCIAL

## 2025-05-23 ENCOUNTER — LAB (OUTPATIENT)
Dept: LAB | Facility: CLINIC | Age: 20
End: 2025-05-23
Payer: COMMERCIAL

## 2025-05-23 ENCOUNTER — HOSPITAL ENCOUNTER (OUTPATIENT)
Dept: CARDIOLOGY | Facility: CLINIC | Age: 20
Discharge: HOME | End: 2025-05-23
Payer: COMMERCIAL

## 2025-05-23 ENCOUNTER — THERAPY VISIT (OUTPATIENT)
Dept: PHYSICAL THERAPY | Facility: CLINIC | Age: 20
End: 2025-05-23
Attending: PSYCHIATRY & NEUROLOGY
Payer: COMMERCIAL

## 2025-05-23 VITALS
HEART RATE: 65 BPM | WEIGHT: 196.87 LBS | BODY MASS INDEX: 29.07 KG/M2 | RESPIRATION RATE: 20 BRPM | DIASTOLIC BLOOD PRESSURE: 58 MMHG | OXYGEN SATURATION: 99 % | SYSTOLIC BLOOD PRESSURE: 95 MMHG

## 2025-05-23 VITALS
WEIGHT: 196.87 LBS | RESPIRATION RATE: 20 BRPM | BODY MASS INDEX: 29.07 KG/M2 | DIASTOLIC BLOOD PRESSURE: 58 MMHG | HEART RATE: 65 BPM | SYSTOLIC BLOOD PRESSURE: 95 MMHG | OXYGEN SATURATION: 99 %

## 2025-05-23 DIAGNOSIS — Z78.9 IMPAIRED MOBILITY AND ADLS: ICD-10-CM

## 2025-05-23 DIAGNOSIS — G71.01 DUCHENNE MUSCULAR DYSTROPHY (H): ICD-10-CM

## 2025-05-23 DIAGNOSIS — Z74.09 IMPAIRED MOBILITY AND ADLS: ICD-10-CM

## 2025-05-23 DIAGNOSIS — J98.4 RESTRICTIVE LUNG DISEASE DUE TO MUSCULAR DYSTROPHY (H): ICD-10-CM

## 2025-05-23 DIAGNOSIS — M85.80 OSTEOPENIA, UNSPECIFIED LOCATION: ICD-10-CM

## 2025-05-23 DIAGNOSIS — I43 CARDIOMYOPATHY IN DUCHENNE MUSCULAR DYSTROPHY (H): ICD-10-CM

## 2025-05-23 DIAGNOSIS — R06.89 HYPOVENTILATION: ICD-10-CM

## 2025-05-23 DIAGNOSIS — G71.01 DUCHENNE MUSCULAR DYSTROPHY (H): Chronic | ICD-10-CM

## 2025-05-23 DIAGNOSIS — G71.01 CARDIOMYOPATHY IN DUCHENNE MUSCULAR DYSTROPHY (H): ICD-10-CM

## 2025-05-23 DIAGNOSIS — J42 CHRONIC BRONCHITIS, UNSPECIFIED CHRONIC BRONCHITIS TYPE (H): ICD-10-CM

## 2025-05-23 DIAGNOSIS — G71.01 DUCHENNE MUSCULAR DYSTROPHY (H): Primary | ICD-10-CM

## 2025-05-23 DIAGNOSIS — G71.00 RESTRICTIVE LUNG DISEASE DUE TO MUSCULAR DYSTROPHY (H): ICD-10-CM

## 2025-05-23 LAB
ALBUMIN SERPL BCG-MCNC: 3.9 G/DL (ref 3.5–5.2)
ALP SERPL-CCNC: 104 U/L (ref 40–150)
ALT SERPL W P-5'-P-CCNC: 64 U/L (ref 0–70)
ANION GAP SERPL CALCULATED.3IONS-SCNC: 12 MMOL/L (ref 7–15)
AST SERPL W P-5'-P-CCNC: 43 U/L (ref 0–45)
BILIRUB SERPL-MCNC: 1 MG/DL
BUN SERPL-MCNC: 14.1 MG/DL (ref 6–20)
CALCIUM SERPL-MCNC: 9.1 MG/DL (ref 8.8–10.4)
CALCIUM SERPL-MCNC: 9.1 MG/DL (ref 8.8–10.4)
CHLORIDE SERPL-SCNC: 98 MMOL/L (ref 98–107)
CREAT SERPL-MCNC: 0.19 MG/DL (ref 0.67–1.17)
CYSTATIN C (ROCHE): 1.1 MG/L (ref 0.6–1)
EGFRCR SERPLBLD CKD-EPI 2021: >90 ML/MIN/1.73M2
EST. AVERAGE GLUCOSE BLD GHB EST-MCNC: 97 MG/DL
GFR/BSA.PRED SERPLBLD CYS-BASED-ARV: 80 ML/MIN/1.73M2
GLUCOSE SERPL-MCNC: 98 MG/DL (ref 70–99)
HBA1C MFR BLD: 5 %
HCO3 SERPL-SCNC: 25 MMOL/L (ref 22–29)
LH SERPL-ACNC: 6.1 MIU/ML (ref 1.7–8.6)
NT-PROBNP SERPL-MCNC: <36 PG/ML (ref 0–93)
PHOSPHATE SERPL-MCNC: 4.5 MG/DL (ref 2.5–4.5)
POTASSIUM SERPL-SCNC: 4.4 MMOL/L (ref 3.4–5.3)
PROT SERPL-MCNC: 6.2 G/DL (ref 6.4–8.3)
SODIUM SERPL-SCNC: 135 MMOL/L (ref 135–145)
T4 FREE SERPL-MCNC: 1.81 NG/DL (ref 0.9–1.7)
TSH SERPL DL<=0.005 MIU/L-ACNC: 1.73 UIU/ML (ref 0.3–4.2)

## 2025-05-23 PROCEDURE — 82247 BILIRUBIN TOTAL: CPT

## 2025-05-23 PROCEDURE — 77080 DXA BONE DENSITY AXIAL: CPT

## 2025-05-23 PROCEDURE — G0463 HOSPITAL OUTPT CLINIC VISIT: HCPCS | Performed by: PSYCHIATRY & NEUROLOGY

## 2025-05-23 PROCEDURE — 97161 PT EVAL LOW COMPLEX 20 MIN: CPT | Mod: GP | Performed by: PHYSICAL THERAPIST

## 2025-05-23 PROCEDURE — 77080 DXA BONE DENSITY AXIAL: CPT | Mod: 26 | Performed by: RADIOLOGY

## 2025-05-23 PROCEDURE — 83002 ASSAY OF GONADOTROPIN (LH): CPT

## 2025-05-23 PROCEDURE — 84439 ASSAY OF FREE THYROXINE: CPT

## 2025-05-23 PROCEDURE — 97535 SELF CARE MNGMENT TRAINING: CPT | Mod: GP | Performed by: PHYSICAL THERAPIST

## 2025-05-23 PROCEDURE — 83036 HEMOGLOBIN GLYCOSYLATED A1C: CPT

## 2025-05-23 PROCEDURE — 71250 CT THORAX DX C-: CPT | Mod: 26 | Performed by: RADIOLOGY

## 2025-05-23 PROCEDURE — 83880 ASSAY OF NATRIURETIC PEPTIDE: CPT

## 2025-05-23 PROCEDURE — 82310 ASSAY OF CALCIUM: CPT

## 2025-05-23 PROCEDURE — G0463 HOSPITAL OUTPT CLINIC VISIT: HCPCS | Performed by: PEDIATRICS

## 2025-05-23 PROCEDURE — 84484 ASSAY OF TROPONIN QUANT: CPT

## 2025-05-23 PROCEDURE — 82306 VITAMIN D 25 HYDROXY: CPT

## 2025-05-23 PROCEDURE — 71250 CT THORAX DX C-: CPT

## 2025-05-23 PROCEDURE — 84443 ASSAY THYROID STIM HORMONE: CPT

## 2025-05-23 PROCEDURE — 82610 CYSTATIN C: CPT

## 2025-05-23 PROCEDURE — 84100 ASSAY OF PHOSPHORUS: CPT

## 2025-05-23 PROCEDURE — 93306 TTE W/DOPPLER COMPLETE: CPT

## 2025-05-23 PROCEDURE — 36415 COLL VENOUS BLD VENIPUNCTURE: CPT

## 2025-05-23 RX ORDER — CALCIUM CARBONATE 500(1250)
2 TABLET ORAL 2 TIMES DAILY
Qty: 120 TABLET | Refills: 5 | Status: SHIPPED | OUTPATIENT
Start: 2025-05-23

## 2025-05-23 RX ORDER — PREDNISONE 20 MG/1
TABLET ORAL
Qty: 40 TABLET | Refills: 5 | Status: SHIPPED | OUTPATIENT
Start: 2025-05-23

## 2025-05-23 NOTE — PROGRESS NOTES
Pediatrics Pulmonary - Provider Note  General Pulmonary - Return Visit    Patient: Christopher Gorman MRN# [id number]   Encounter: 2025 : 2005      I saw Christopher at the Pediatric Pulmonary Clinic for a routine MDA visit accompanied by his Mother and father.    Subjective:   HPI: Last visit was on 2025.    Christopher is a 19 year-old male patient with history of Duchenne muscular dystrophy, restrictive lung disease and nocturnal hypoventilation as demonstrated on sleep study completed in 2019 when he was found to have an RDI of 9.5 and sleep fragmentation.    He receives non invasive ventilation, AVAPs, settings below.     For airway clearance impairment cough assist 3 times a day with auto track, during illnesses  Can use suction after treatment  When sick continuous pulse oximetry and add an extra cough assist every 30 min as needed for sats under 95%    Breath stacking 3 times a day    During last visit, he had recovered from COVID in 10/2024.  No recent colds or lung infections or need for antibiotics or cough assist since last visit.   Continues Breath stacking 2-3x/day.     AVAPs, tolerating pressures. Mask well fitting. Leaks when he is on his sides, which is often. Equipment regularly changed.   No snoring, witnessed apneas.   No prolonged cough, wheezing, shortness of breath.   No new medications.   No f/h of asthma.     Sleeping well  - Consistent sleep schedule, going to bed between 10:30 and 11:00 PM, waking up between 7:00 and 8:00 AM  2-3 nocturnal awakenings for position, able to sleep quickly after         AVAPs settings  EPAP: 7 cwp  IPAP 10-25 cwp  TV: 550 ml  Rate: 8      DL: 2025 - 2025  Avg IPAP 14-15  Avg -750  Avg breaths triggered 55-81%  Avg Residual AHI 6-13    Sleeps well, bedtime is at 10:30 pm, SOL 5 min, sleeps through the night, needs reposition 2 times per night, wakes up 7-8 am, hard to get up  No need for naps    Last sleep study done on 10/2/2022   Residual  "AHI 0.5 events per hour, however no REM supine on the final pressure. Loud snoring, normal respiratory rate and pattern. No sleep associated hypoxemia or hypoventilation.Continue iVAPS 16/4.8/5/4/20/1.5/0.8/300 cmH2O    Allergies  Allergies as of 05/23/2025    (No Known Allergies)     Current Outpatient Medications   Medication Sig Dispense Refill    acetaminophen (TYLENOL) 325 MG tablet Take 2 tablets (650 mg) by mouth every 6 hours as needed for mild pain or fever 1 Bottle 0    Ascorbic Acid (VITAMIN C PO) Take 3 tablets by mouth daily      CALCIUM ANTACID 500 MG chewable tablet 1,000 mg 2 times daily.  0    calcium carbonate 500 mg, elemental, (OSCAL) 500 MG tablet Take 2 tablets (1,000 mg) by mouth 2 times daily. 120 tablet 5    carvedilol (COREG) 25 MG tablet Take 1 tablet (25 mg) by mouth 2 times daily (with meals). 180 tablet 3    Cholecalciferol (VITAMIN D3) 50 MCG (2000 UT) CAPS Take 2 capsules by mouth daily 60 capsule 5    enalapril (VASOTEC) 10 MG tablet Take 1.5 tablets (15 mg) by mouth 2 times daily. 60 tablet 4    eplerenone (INSPRA) 25 MG tablet Take 1 tablet (25 mg) by mouth daily. 90 tablet 3    FLUoxetine (PROZAC) 10 MG capsule Take 10 mg by mouth daily      order for DME Sling for Micah Lift. 1 Units 0    predniSONE (DELTASONE) 20 MG tablet TAKE 5 TABLETS BY MOUTH TWICE WEEKLY 40 tablet 5       Past medical history, surgical history and family history reviewed with patient/parent today, no changes.      RoS  A comprehensive review of systems was performed and is negative except as noted in the HPI.      Objective:     Physical Exam  BP 95/58   Pulse 65   Resp 20   Wt 89.3 kg (196 lb 13.9 oz)   SpO2 99%   BMI 29.07 kg/m    Ht Readings from Last 2 Encounters:   04/18/24 1.753 m (5' 9\") (42%, Z= -0.21)*   02/23/24 1.7 m (5' 6.93\") (17%, Z= -0.94)*     * Growth percentiles are based on CDC (Boys, 2-20 Years) data.     Wt Readings from Last 2 Encounters:   05/23/25 89.3 kg (196 lb 13.9 oz) "   05/23/25 89.3 kg (196 lb 13.9 oz)     BMI %: > 36 months -  Facility age limit for growth %natalie is 20 years.    GENERAL: alert and no distress in wheelchair  EYES: Eyes grossly normal to inspection.  No discharge or erythema, or obvious scleral/conjunctival abnormalities.  RESP: No audible wheeze, cough, or visible cyanosis.    SKIN: Visible skin clear. No significant rash, abnormal pigmentation or lesions.  NEURO: Cranial nerves grossly intact.  Mentation and speech appropriate for age.  PSYCH: Appropriate affect, tone, and pace of words        Spirometry performed last visit      Results for orders placed or performed in visit on 05/23/25   General PFT Lab (Please always keep checked)    Collection Time: 05/23/25 11:39 AM   Result Value Ref Range    FVC-Pred 4.90 L    FVC-Pre 1.50 L    FVC-%Pred-Pre 30 %    FEV1-Pre 0.80 L    FEV1-%Pred-Pre 19 %    FEV1FVC-Pred 87 %    FEV1FVC-Pre 53 %    FEFMax-Pred 9.88 L/sec    FEFMax-Pre 1.71 L/sec    FEFMax-%Pred-Pre 17 %    FEF2575-Pred 4.68 L/sec    FEF2575-Pre 0.33 L/sec    FYD4462-%Pred-Pre 7 %    ExpTime-Pre 6.28 sec    FIFMax-Pre 1.44 L/sec    MEP-Pre 43 cmH2O    MIP-Pre -40 cmH2O    FEV1FEV6-Pred 84 %    FEV1FEV6-Pre 55 %         Spirometry Interpretation: (last visit)  Severe restriction with minimal improve in lung function since last PFT. There is also a obstructive component  Peak cough flows are low. Post bronchodilator maneuvers requested today  Courtney Ponce MD      Assessment and Plan     Juanito is a 19 year-old male with history of Duchenne muscular dystrophy with the following respiratory concerns:  severe restrictive pattern   Airway clearance impairment  Nocturnal hypoventilation on NIV with AVAPS        His spirometer/PFT today suggests known restriction with obstructive component. Per interview/prior imaging/exam there is no obvious reasoning for this. No concerns of mucus plugging.   Question if scoliosis might be playing a role here, as physical  obstruction.     His download suggested extra volume compared to setting. No extra leak. He is tolerating pressures well, does not feel too much/little.     He will continue to use AVAPs, breath stacking  We obtain CT Chest wo Con to better assess airway.   [place] [facility] [place] -- turns out IPAP represents PS. We changed settings to actually refect a min PS - max PS of 10-25, so his IPAP will be reflected as 4-25.     Follow up will depend on CT chest.       Encounter Diagnoses   Name Primary?    Duchenne muscular dystrophy (H) Yes    Chronic bronchitis, unspecified chronic bronchitis type (H)          Patient Instructions   Chest CT today before you leave  [place] to call [facility] to ask about oximeter  Follow up in 6 months        CC  Jose Finn    Copy to patient  MIRNA RUTLEDGE TRAVIS  [address]  [facility] [address]           Diagnoses   Name Primary?    Duchenne muscular dystrophy (H) Yes    Chronic bronchitis, unspecified chronic bronchitis type (H)     Restrictive lung disease due to muscular dystrophy (H)     Cardiomyopathy in Duchenne muscular dystrophy (H)     Hypoventilation          Patient Instructions   Chest CT today before you leave  Rachel to call [facility] to ask about oximeter  Follow up in 6 months        CC  Jose Finn    Copy to patient  MIRNA RUTLEDGE TRAVIS  [address]  [facility] [address]

## 2025-05-23 NOTE — PROGRESS NOTES
"             Pediatric Neuromuscular Clinic      Christopher Gorman MRN# [id number]   YOB: 2005 Age: 19 year old      Date of Visit: May 23, 2025    Primary care provider: Jose Finn    Refering physician:[unfilled]      History is obtained from the patient, family and medical record       Interval Change:      Christopher Gorman is a 19 year old male was seen and examined at the pediatric neuromuscular clinic on May 23, 2025 for a follow up evaluation of previously diagnosed with DMD. He reports no new issues or concerns. He reports no aches or pains. He reports no edema no shortness of breath. He is non-ambulatory and uses a PWC full time. He reports no cardiac symptoms. He continues with respiratory therapies such as breath stacking.    He is transferred via a cheng lift. He uses a rolling shower which is malfunctioning as it is very old.   He denies depression or anxiety as he continues on low dose of fluoxetine.  Currently is taking prednisone, vitamin D, Vitamin C, calcium, and heart medications.  Denies any side effects.  Denies any new shortness of breath.  Does have baseline neuromuscular weakness with breathing overnight and uses BiPAP consistently.  Denies any GI concerns, otherwise sleeping well.  He denies cardiac symptoms.  He lives with parents.    He spends most of his time on his phone and playing video games. He has a spinal fusion scheduled for 8/13. He is excited for the surgery because he feels \"tippy\" while sitting.   He needs a new rolling shower commode chair because the cable that is used to tilt the chair is corroded so he is unable to tilt. The brakes are also failing on his current chair. They report that it fits him well. His dad found a place to donate their old PWCs ([employer]).    He has severe scoliosis and requires spinal fusion surgery with posterior instrumented spinal fusion T2 or T3 down to pelvis per Dr. Gil.             Immunizations: "     Immunization History   Administered Date(s) Administered    Influenza Vaccine >6 months,quad, PF 2014    Pneumococcal 23 valent 2014            Allergies:    No Known Allergies          Medications:     Prescription Medications as of 2025         Rx Number Disp Refills Start End Last Dispensed Date Next Fill Date Owning Pharmacy    acetaminophen (TYLENOL) 325 MG tablet  1 Bottle 0 2019 --   [facility] [address]    Sig: Take 2 tablets (650 mg) by mouth every 6 hours as needed for mild pain or fever    Class: No Print Out    Route: Oral    Ascorbic Acid (VITAMIN C PO)  -- --  --       Sig: Take 3 tablets by mouth daily    Class: Historical    Route: Oral    CALCIUM ANTACID 500 MG chewable tablet  -- 0 7/3/2019 --   [facility] [address] [place] [address]    Si,000 mg 2 times daily.    Class: Historical    calcium carbonate 500 mg, elemental, (OSCAL) 500 MG tablet  120 tablet 5 2024 --   [facility] [address]    Sig: TAKE 2 TABLETS (1,000 MG) BY MOUTH 2 TIMES DAILY    Class: E-Prescribe    Notes to Pharmacy: We are filling last refill today and the patient is requesting authorization to refill once that supply has been used. Thank you!    Route: Oral    carvedilol (COREG) 25 MG tablet  180 tablet 3 2025 --   [facility] [address]    Sig: Take 1 tablet (25 mg) by mouth 2 times daily (with meals).    Class: E-Prescribe    Route: Oral    Cholecalciferol (VITAMIN D3) 50 MCG ( UT) CAPS  60 capsule 5 2020 --   [facility]  [address]    Sig: Take 2 capsules by mouth daily    Class: E-Prescribe    Route: Oral    enalapril (VASOTEC) 10 MG tablet  60 tablet 4 2025 --   [facility] [address]    Sig: Take 1.5 tablets (15 mg) by mouth 2 times daily.    Class: E-Prescribe    Route: Oral    eplerenone (INSPRA)  25 MG tablet  90 tablet 3 1/28/2025 --   [facility] [place], [address]    Sig: Take 1 tablet (25 mg) by mouth daily.    Class: E-Prescribe    Route: Oral    FLUoxetine (PROZAC) 10 MG capsule  -- --  --   [facility] [place], [address]    Sig: Take 10 mg by mouth daily    Class: Historical    Route: Oral    order for DME  1 Units 0 8/11/2017 --   [facility] [place], [address]    Sig: Sling for Micah Lift.    Class: Local Print    predniSONE (DELTASONE) 20 MG tablet  40 tablet 0 5/12/2025 --   [facility] [place], [address]    Sig: TAKE 5 TABLETS BY MOUTH TWICE WEEKLY    Class: E-Prescribe                  Review of Systems:   The 10 point Review of Systems is negative other than noted in the HPI         Physical Exam:     BP 95/58   Pulse 65   Resp 20   Wt 196 lb 13.9 oz (89.3 kg)   SpO2 99%   BMI 29.07 kg/m     Physical Exam:   General: NAD  Extremities: Warm, dry  Neurologic:   Mental Status Exam: Alert, awake and easily engaged in interaction.   Cranial Nerves: PERRLA, EOMs intact, no nystagmus, facial movements symmetric, no deviation in uvula or tongue, tongue midline and fully mobile                with no atrophy or fasciculations.    Motor:  Manual muscle testing:    Joint/body area Motion Left Right   Neck Flexion 2 --    Shoulder Flexion 2 2      Abduction 2 2   Elbow Flexion 2 2      Extension 2 2   Wrist Flexion 3 3     Extension 3.5 3.5   Hip Flexion 1 1   Knee Flexion 2 2      Extension 2- 2-   Ankle Dorsiflexion 2 2      Plantarflexion 3- 3-                Data:     Office Visit on 05/23/2025   Component Date Value Ref Range Status    FVC-Pred 05/23/2025 4.90  L Preliminary    FVC-Pre 05/23/2025 1.50  L Preliminary    FVC-%Pred-Pre 05/23/2025 30  % Preliminary    FEV1-Pre 05/23/2025 0.80  L Preliminary    FEV1-%Pred-Pre 05/23/2025 19  % Preliminary    FEV1FVC-Pred 05/23/2025 87  % Preliminary    FEV1FVC-Pre  05/23/2025 53  % Preliminary    FEFMax-Pred 05/23/2025 9.88  L/sec Preliminary    FEFMax-Pre 05/23/2025 1.71  L/sec Preliminary    FEFMax-%Pred-Pre 05/23/2025 17  % Preliminary    FEF2575-Pred 05/23/2025 4.68  L/sec Preliminary    FEF2575-Pre 05/23/2025 0.33  L/sec Preliminary    SLM5665-%Pred-Pre 05/23/2025 7  % Preliminary    ExpTime-Pre 05/23/2025 6.28  sec Preliminary    FIFMax-Pre 05/23/2025 1.44  L/sec Preliminary    MEP-Pre 05/23/2025 43  cmH2O Preliminary    MIP-Pre 05/23/2025 -40  cmH2O Preliminary    FEV1FEV6-Pred 05/23/2025 84  % Preliminary    FEV1FEV6-Pre 05/23/2025 55  % Preliminary     ECHO - 58% EF.         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 He is a full-time PWC user.  Cardiomyopathy with normal cardiac function.  Severe scoliosis, he is scheduled to have spinal instrumentation surgery.   Restricted lung disease, nocturnal non-invasive eventilation.    Recommendations:  -  Continue Prednisone at current dose.  -Continue treatment of cardiomyopathy per Dr. Thompson  -Continue respiratory management per Dr. Ponce.  -Return to clinic in 6 months.  -PT evaluation.    The longitudinal plan of care for the diagnosis as documented were addressed during this visit. Due to the added complexity in care, I will continue to support Christopher in the subsequent management and with ongoing continuity of care.    I have spent at least 30 min on the date of the encounter in face-to-face evaluation, chart review, patient visit, review of tests, counseling the patient, documentation about the issues documented above. See note for details.    Sincerely,        Tray Cavazos MD  Neurology and Neuromuscular medicine  [phone]

## 2025-05-23 NOTE — NURSING NOTE
"[facility] [[id number]]  Chief Complaint   Patient presents with    RECHECK     MD follow up     Initial BP 95/58   Pulse 65   Resp 20   Wt 196 lb 13.9 oz (89.3 kg)   SpO2 99%   BMI 29.07 kg/m   Estimated body mass index is 29.07 kg/m  as calculated from the following:    Height as of 4/18/24: 5' 9\" (175.3 cm).    Weight as of this encounter: 196 lb 13.9 oz (89.3 kg).  Medication Reconciliation: complete    Does the patient need any medication refills today? No    Does the patient/parent have MyChart set up? Yes   Proxy access needed? Yes    Is the patient 18 or turning 18 in the next 2 months? No   If yes, make sure they have a Consent To Communicate on file            Una Barksdale LPN    "

## 2025-05-23 NOTE — PATIENT INSTRUCTIONS
Pediatric Neuromuscular Specialty Clinic  [facility]    Contact Numbers:    For questions that are not urgent, contact:  Rachel Romero RN Care Coordinator:  [phone]  Norma Byrd RN Care Coordinator: [phone]     After hours, or for urgent questions,   contact: [phone]    Schedule or change an appointment:  Patrica Ma, [phone]    Genetic Counselor: Desiree Minaya, [phone]    Physical Therapy: Adeline Smith, [phone]     Dietician: Ana Flores, [phone]    Prescription renewals:  Your pharmacy must fax request to [phone]  **Please allow 2-3 days for prescriptions to be authorized.    Today's Visit:   No changes   In order to get you informed of potential treatment options, information was provided for Duvyzat (givinostat) for your review.  Follow up in Peds Clinic in 6 months.   1-2 drinks

## 2025-05-23 NOTE — PATIENT INSTRUCTIONS
Chest CT today before you leave  Rachel to call [facility] to ask about oximeter  Follow up in 6 months

## 2025-05-23 NOTE — NURSING NOTE
"[facility] [[id number]]  Chief Complaint   Patient presents with    RECHECK     MD follow up     Initial BP 95/58   Pulse 65   Resp 20   Wt 196 lb 13.9 oz (89.3 kg)   SpO2 99%   BMI 29.07 kg/m   Estimated body mass index is 29.07 kg/m  as calculated from the following:    Height as of 4/18/24: 5' 9\" (175.3 cm).    Weight as of this encounter: 196 lb 13.9 oz (89.3 kg).  Medication Reconciliation: complete    Does the patient need any medication refills today? No    Does the patient/parent have MyChart set up? Yes   Proxy access needed? No    Is the patient 18 or turning 18 in the next 2 months? No   If yes, make sure they have a Consent To Communicate on file            Una Barksdale LPN    "

## 2025-05-23 NOTE — PROGRESS NOTES
"OUTPATIENT PHYSICAL THERAPY CLINIC NOTE    Christopher Gorman                              YOB: 2005  [id number]     Type of visit:                                                                              Evaluation            Date of service: 5/23/2025      Referring provider: Dr Tray Cavazos      present: No  Language: English     Others present at visit:  Parent(s) - mother and father   Sibling - Holger (also has DMD)     Medical diagnosis:   Duchenne Muscular dystrophy (DMD)      Pertinent medical history: Christopher has returned to [facility] today for 6 mo follow up visit. Overall, things are going well. He needs to be reminded to stand and tilt for pressure relief and he often refuses, but he does stand several times per week. He spends most of his time on his phone and playing video games. He has a spinal fusion scheduled for 8/13. He is excited for the surgery because he feels \"tippy\" while sitting. His brother offered to switch rooms post op temporarily to be in the room that has a ceiling track and from the bathroom. The ceiling track in their house can't be extended because it is now medical grade; it would need to be fully replaced. They are working with their county  to get someone to replace the tile in their bathroom because previous tile was not heavy duty enough for the PWCs and it is falling out. He needs a new rolling shower commode chair because the cable that is used to tilt the chair is corroded so he is unable to tilt. The brakes are also failing on his current chair. They report that it fits him well. His dad found a place to donate their old PWCs ([employer]).     Cardio-respiratory status:  FVC 30% predicted     Height/Weight: 5' 9\" / 196 lbs     Living environment:  House - 3 stairs to enter (ramp); 14 steps inside the house (does not access); bedroom and bathroom on main level       Current assistance/living environment:  Lives with parents " and brother.      Current mobility equipment:  [facility] - Permobil F5 (spring 2023; [employer])               Ceiling lift - bedroom only    Modified van       Current ADL equipment:  Rolling shower commode chair - multiple broken components due to normal wear and tear   Able to sit independently on the toilet - needs assist to wipe    Standard queen bed with memory foam topper (sleeps on L side and back; needs to be turned 2-4x/night)   No orthotics  Simple stuff works sleep system      Technology used: computer, phone     Patient concerns/goals: Obtain new [facility]     Evaluation              Interview completed.                Pain assessment: None                Range of motion: tested while seated in [facility]                          Soleus  -10 degrees B                           Knee extension short by 50 deg B, R LE slightly tighter than L                            Manual muscle testing:    Joint/body area Motion Left Right   Neck Flexion 3- --    Shoulder Flexion 2 2      Abduction 2 2   Elbow Flexion 3- 2+      Extension 2 2   Wrist Flexion 4- 4-    Extension 4- 4-   Pad pinch HHD (lbs) 2.0 2.6   Key pinch HHD (lbs) 4.0 4.2    HHD (lbs) 9.5 13.2   Hip Flexion 1 1   Knee Flexion 2 2      Extension 2- 2-   Ankle Dorsiflexion 2 2      Plantarflexion 3- 3-                            Gait:  Non ambulatory since Fall 2016                          Cognition:  WFL - not formally tested.    9 Hole Peg Test     Dominant hand: 49.7 seconds (R hand)   Non-dominant hand: 48.5 seconds (L hand)      Normative Values    Age Dominant Non-dominant   4-5 29.8 34.5   6-7 25.5 28.5   8-9 19.9 21.7   10-11 18.9 20.2   12-13 18.0 18.4   14-15 18.0 18.6   16-17 16.9 17.1   18-19 16.1 16.7     Anitha LOZANO, et al. Measuring dexterity in children using the Nine-hole Peg Test. Journal of Hand Therapy. 2005;18(3):348-351.     Juliana Upper Extremity Rating Scale: 5  Starting with arms at the sides, the patient can abduct the arms in a full [place]  until they touch above the head.   Can raise arms above head only by flexing the elbow (shortening the circumference of the movement) or using accessory muscles.  Cannot raise hands above head, but can raise an 8-oz glass of water to the mouth.  Can raise hands to the mouth, but cannot raise an 8-oz glass of water to the mouth.  Cannot raise hands to the mouth, but can use hands to hold a pen or  pennies from the table.  Cannot raise hands to the mouth and has no useful function of hands.    Vignos Functional Rating Scale for Muscular Dystrophy: 9  1. Walks and climbs stairs without assistance  2. Walks and climbs stairs with aid of railing  3. Walks and climbs stairs slowly with aid of railing [over 12 seconds for four standard stairs]  4. Walks unassisted and raises from chair but cannot climb stairs  5. Walks unassisted but cannot rise from chair or climb stairs  6. Walks only with assistance or walks independently with long leg braces  7. Walks in long leg braces but requires assistance for balance  8. Stands in long leg braces but is unable to walk even with assistance  9. Is in wheelchair  10. Is confined to bed    [facility] Scale Version 2    Item Score   1. Ability to use wheelchair 2   2. Ability to transfer from wheelchair 2   3. Ability to stand 2   4. Ability to balance in the wheelchair 2   5. Ability to move the arms 3   6. Ability to use the hands and arms for eating 1   7. Ability to turn in bed 3   8. Ability to cough 0   9. Ability to speak 0   10. Physical well-being (respiratory insufficiency) 2   11. Daytime fatigue 2   12. Head control 2   13. Ability to control joystick 0   14. Food textures 1   15. Eating a meal 1   16. Swallowing 2   17. Hand function 1    TOTAL SCORE 26 / 51     Notes:   - A higher score reflects a lower level of functional ability  - Items on the scale are scored according to the best an individual has done in the last two weeks, especially if there is  variation between good days and bad days.     References:   Jessica LEONARDO, Valorie RACHEL, Violeta S, Сергей CLINTON. Validity of the EK scale: a functional assessment of non-ambulatory individuals with Duchenne muscular dystrophy or spinal muscular atrophy. Physiotherapy Research International, 6(6) 119-134, 2001.  Jessica PEREYRA, Valorie RACHEL, Сергей Vazquez. Reliability of the EK Scale, a Functional Test for Non-ambulatory Persons with Duchenne Dystrophy. Advances in Physiotherapy 2002; 4:37-47.  Jessica PEREYRA, Camden QUINONES, Alvino QUINONES, et al. Egen Classification revisited in SMA. Neromuscul Disord 2008; 18: 740-41.      Fall Risk Screen:   Has the patient fallen 2 or more times in the last year? No              Has the patient fallen and had an injury in the past year? No              Timed Up and Go Score: Not able to perform due to non ambulatory   Is the patient a fall risk? Yes, department fall risk interventions implemented          Impairments:  Fatigue  Muscle atrophy  Balance  Range of motion  Skin integrity      Treatment diagnosis:  Impaired mobility  Impaired activities of daily living     Clinical Presentation: Evolving/Changing  Clinical Presentation Rationale: Progressive nature of DMD.  Clinical Decision Making (Complexity): Low complexity      Recommendations/Plan of care:  Patient would benefit from interventions to enhance safety and independence.  Rehab potential good for stated goals.              Evaluation and one time treatment only    Equipment recommended    Rolling shower commode chair    Goals:    Target date: 5/23/2025   Patient, family and/or caregiver will verbalize understanding of evaluation results and implications for functional performance.  Patient, family and/or caregiver will verbalize/demonstrate understanding of home program.      Educational assessment/barriers to learning:   No barriers noted                Treatment provided this date:    Self care, 10 minutes:               Discussed  results of evaluation with regard to impairments and functional performance and compared them to previous visit.   HEP review: Encouraged daily standing (3x/day or more), increased frequency of tilt for pressure reduction, compliance with daily stretching and deep breathing.   Discussed expectations for post op spinal fusion. Educated on differences in compensatory strategies. Discussed transfers post op. He is planning to use his brother's room which has a ceiling lift bed<>bathroom.   Discussed options for rolling shower commode chair with comparison to his current chair. The main issue is broken parts and general wear and tear, including the inability to tilt (broken) and that the brakes don't work.      Response to treatment/recommendations:  Parents and pt demo understanding.     Goal attainment:  All goals met                Risks and benefits of evaluation/treatment have been explained.  Patient, family and/or caregiver are in agreement with Plan of Care.                Timed Code Treatment Minutes: 10  Total Treatment Time (sum of timed and untimed services): 20     Signature:   Adeline Smith, PT, DPT  Physical Therapist  [facility]  [address], [facility] [address] [address]  Phone: [phone]  Fax: [phone]  Email: [email or web address]    Date: 5/23/2025      Certification  RESERVE SELECT, Medicaid:  Onset date: 5/23/2025   Start of care date: 5/23/2025   Certification date from 5/23/2025 to 5/23/2025      I CERTIFY THE NEED FOR THESE SERVICES FURNISHED UNDER                   THIS PLAN OF TREATMENT AND WHILE UNDER MY CARE     (Physician co-signature of this document indicates review and certification of the therapy plan).

## 2025-05-27 LAB
ATRIAL RATE - MUSE: 59 BPM
DIASTOLIC BLOOD PRESSURE - MUSE: NORMAL MMHG
INTERPRETATION ECG - MUSE: NORMAL
P AXIS - MUSE: 30 DEGREES
PR INTERVAL - MUSE: 120 MS
QRS DURATION - MUSE: 86 MS
QT - MUSE: 388 MS
QTC - MUSE: 384 MS
R AXIS - MUSE: 33 DEGREES
SYSTOLIC BLOOD PRESSURE - MUSE: NORMAL MMHG
T AXIS - MUSE: 22 DEGREES
TROPONIN I SERPL HS-MCNC: <3 NG/L
VENTRICULAR RATE- MUSE: 59 BPM

## 2025-05-28 LAB
DEPRECATED CALCIDIOL+CALCIFEROL SERPL-MC: <42 UG/L (ref 20–75)
VITAMIN D2 SERPL-MCNC: <5 UG/L
VITAMIN D3 SERPL-MCNC: 37 UG/L

## 2025-05-28 NOTE — NURSING NOTE
Discussed transition to adult care with Christopher and his parents. Christopher knows his medications and has been requesting medication refills with pharmacy. Christopher knows who provides and services his DME equipment. He is engaged and actively participates in clinic visit discussions with providers. Reviewed insurance. Christopher knows who his [facility]  is and how to reach her. Goal established to transfer to adult neuromuscular care at [facility] in Spring 2026.

## 2025-05-28 NOTE — NURSING NOTE
Christopher never received oximeter from [facility]. RNCC resent order and message to [facility] to dispense. Oximeter is required as part of airway clearance program to drive frequency of interventions and provide indication of when to notify pulmonologist.

## 2025-05-29 DIAGNOSIS — G71.01 DUCHENNE MUSCULAR DYSTROPHY (H): Primary | Chronic | ICD-10-CM

## 2025-05-29 DIAGNOSIS — Z74.09 IMPAIRED MOBILITY AND ADLS: ICD-10-CM

## 2025-05-29 DIAGNOSIS — Z78.9 IMPAIRED MOBILITY AND ADLS: ICD-10-CM

## 2025-06-16 ENCOUNTER — RESULTS FOLLOW-UP (OUTPATIENT)
Dept: PEDIATRIC NEUROLOGY | Facility: CLINIC | Age: 20
End: 2025-06-16

## 2025-06-26 ENCOUNTER — CARE COORDINATION (OUTPATIENT)
Dept: PEDIATRIC NEUROLOGY | Facility: CLINIC | Age: 20
End: 2025-06-26
Payer: COMMERCIAL

## 2025-06-26 NOTE — PROGRESS NOTES
Orders for power wheel chair armrest, tilt/recline, and labor received for patient from [facility] via fax. Signed by Dr. Cavazos and returned via fax to [phone]. Copy uploaded to Media tab for future reference.

## 2025-07-17 ENCOUNTER — CARE COORDINATION (OUTPATIENT)
Dept: PEDIATRIC NEUROLOGY | Facility: CLINIC | Age: 20
End: 2025-07-17
Payer: COMMERCIAL

## 2025-07-17 NOTE — PROGRESS NOTES
[facility] unable to provide oximeter due to the equipment being an exclusion under Christopher's insurance plan unless he has oxygen in the home. Call placed to [facility] [employer] to inquire about coverage for oximeter under plan as Christopher uses noninvasive ventilation for hypoventilation and requires oximeter to determine frequency of airway clearance. [employer] NPI number [id number] provided to open inquiry. Per [facility], oximeter is likely covered under insurance plan but may require prior authorization. Reference number for call to insurance [id number]. Contacted [facility] Admission Specialist, Fausto. She was able to look into his [facility] PMAP plan and see there may in fact be verbiage that allows for oximeter. [facility] will provide oximeter to Christopher and initiate authorization for coverage with insurance. [facility] will contact RNCC if any letters of medical necessity are needed.

## 2025-07-18 ENCOUNTER — ANESTHESIA EVENT (OUTPATIENT)
Dept: SURGERY | Facility: CLINIC | Age: 20
End: 2025-07-18
Payer: COMMERCIAL

## 2025-07-18 ENCOUNTER — PRE VISIT (OUTPATIENT)
Dept: SURGERY | Facility: CLINIC | Age: 20
End: 2025-07-18

## 2025-07-23 NOTE — PROGRESS NOTES
Pediatric Endocrinology Follow-up Consultation    Patient: Christopher Gorman MRN# [id number]   YOB: 2005 Age: 19year 9month old   Date of Visit: Jul 25, 2025    Dear Dr. Jose Finn:    I had the pleasure of seeing your patient, Christopher Gorman in the Pediatric Endocrinology Clinic, [facility], on Jul 25, 2025 for a follow-up consultation of vitamin D deficiency and osteoporosis secondary to chronic glucocortoid therapy.         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 He was previously seen by my colleague, Dr. Delatorre, and was last seen in 2018.     To review, he was diagnosed with DMD in April 2014. Steroid treatment (prednisone) was started in 2014. Christopher was found to have a vertebral compression fracture and received zoledronate therapy in July 2019. He had severe hypocalcemia following the infusion requiring hospitalization.      Christopher was found to have a vertebral compression fracture and received zoledronate therapy in July 2019. He had severe hypocalcemia  following the infusion requiring hospitalization.    INTERIM HISTORY: Since last visit on 11/17/2022, Christopher's health has been stable with no significant new concerns.     No recent falls. No recent back, muscle or joint pain.      Christopher takes 500 mg Oscal twice daily and 1000 mg Tums twice daily. He is also taking vitamin D 5000 daily.     He had his most recent zoledronate infusion on 9/9/2021.  Since the last zoledronate infusion Christopher has not had any new falls or back pain.     Christopher Gorman was diagnosed with Duchenne Muscular Dystrophy  in April 2014 at age ***.NAME@ was found to have *** mutation.      DMD treatment:  - Steroids: Steroid treatment (prednisone) was started in 2014. Christopher continues to take 5 20 mg tablets of prednisone twice weekly..   - Other tx: ***      Bone Health:  - Fracture history: There is *** no history of fractures. Big toe  - Back pain:  no pain now, sometimes hurts   - Ambulation:    - Last spine XR:   - Last DXA scan:   - Vitamin D and calcium:   - Treatments:       Growth and BMI:   Christopher Gorman's growth data was reviewed during his visit and showed that he had been tracking *** %ile for his length. Review of his weight showed that he had been tracking *** %ile until the past year where there's been an uptick in the rate of his weight gain. His BMI has been increasing and is now at the **** th%ile.     Puberty:  Christopher Gorman and his parent haven't *** noticed any signs of puberty including development of adult body odor, pubic hair. He has *** not noticed testicular or phallic enlargement.         History was obtained from Christopher and his parents, his brother was also present.         Social History:   He graduated from high school.    Social history was reviewed and is unchanged. Refer to the initial note.         Family History:     Family History   Problem Relation Age of Onset    Thyroid Disease Mother     Diabetes Maternal Grandmother     Thyroid Disease Maternal Grandmother      Muscular Dystrophy Brother      Dad has recent kidney stones.    Family history was reviewed and is unchanged. Refer to the initial note.         Allergies:   No Known Allergies          Medications:     Current Outpatient Medications   Medication Sig Dispense Refill    acetaminophen (TYLENOL) 325 MG tablet Take 2 tablets (650 mg) by mouth every 6 hours as needed for mild pain or fever 1 Bottle 0    Ascorbic Acid (VITAMIN C PO) Take 3 tablets by mouth daily      CALCIUM ANTACID 500 MG chewable tablet 1,000 mg 2 times daily.  0    calcium carbonate 500 mg, elemental, (OSCAL) 500 MG tablet Take 2 tablets (1,000 mg) by mouth 2 times daily. 120 tablet 5    carvedilol (COREG) 25 MG tablet Take 1 tablet (25 mg) by mouth 2 times daily (with meals). 180 tablet 3    Cholecalciferol (VITAMIN D3) 50 MCG (2000 UT) CAPS Take 2 capsules by mouth daily 60 capsule 5    enalapril (VASOTEC) 10 MG tablet Take 1.5 tablets (15 mg) by mouth 2 times daily. 60 tablet 4    eplerenone (INSPRA) 25 MG tablet Take 1 tablet (25 mg) by mouth daily. 90 tablet 3    FLUoxetine (PROZAC) 10 MG capsule Take 10 mg by mouth daily      order for DME Sling for Micah Lift. 1 Units 0    predniSONE (DELTASONE) 20 MG tablet TAKE 5 TABLETS BY MOUTH TWICE WEEKLY 40 tablet 5             Review of Systems:   Gen: Negative  Eye: Negative, no vision concerns. Due for another eye appointment with good vision.   ENT: Negative, no hearing concerns. He had braces removed in July 2021. He has had issues with wisdom teeth. They are investigating options for surgery due to challenges related to mobility and anesthesia.  Pulmonary: He uses a BIPAP machine some of the time at night (6 hours).  Cardio: Negative, no dizziness or fainting.   Gastrointestinal: Some constipation and is on Colace.  Hematologic: Negative, no bruising or bleeding concerns.  Genitourinary: Negative, no bladder concerns.  Musculoskeletal: He uses a electronic chair at all times except for when he  uses a lift for the bathroom and transfers.   Psychiatric: Negative  Neurologic: Negative, no headaches. No seizures.   Skin: Dry skin due to eczema. A little acne on nose and chin.  Endocrine: see HPI. He does not shave his face yet.             Physical Exam:   There were no vitals taken for this visit.  Growth %ile SmartLinks can only be used for patients less than 20 years old.  Height: 0 cm   Facility age limit for growth %natalie is 20 years.  Weight: 89.3 kg (actual weight), Facility age limit for growth %natalie is 20 years. 165 lbs at the time of surgery for appendicitis.  BMI: There is no height or weight on file to calculate BMI. No height and weight on file for this encounter.     GENERAL:  He is alert and in no apparent distress. Mildly Cushingoid face.   HEENT:  Head is  normocephalic and atraumatic.  Pupils equal, round and reactive to light and accommodation.  Extraocular movements are intact.  Funduscopic exam shows crisp disc margins and normal venous pulsations.  Nares are clear.  Oropharynx shows normal dentition uvula and palate.  Tympanic membranes visualized and clear.   NECK:  Supple.  Thyroid was palpable, smooth with no nodules. It was not enlarged.    LUNGS:  Clear to auscultation bilaterally.   CARDIOVASCULAR:  Regular rate and rhythm without murmur, gallop or rub.   BREASTS:  Teo I.  Axillary hair, odor and sweat were absent.   ABDOMEN:  Nondistended.  Positive bowel sounds, soft and nontender.  No hepatosplenomegaly or masses palpable.   GENITOURINARY EXAM: Deferred, Exam was Teo V at previous visit.   MUSCULOSKELETAL: He is wheelchair bound. No evidence of scoliosis. No pain to palpation and percussion of the spine lumbar spine.   NEUROLOGIC:  Cranial nerves II-XII tested and intact.   SKIN:  No evidence of acne or oiliness. No striae.  [place] colored macules on the back.         Laboratory results:     Component      Latest Ref Rng 5/23/2025  9:05 AM   25 OH Vit D2      ug/L <5    25  OH Vit D3      ug/L 37    25 OH Vit D total      20 - 75 ug/L <42    Cystatin C      0.6 - 1.0 mg/L 1.1 (H)    GFR Calculated with Cystatin C      >=60 mL/min/1.73m2 80    Estimated Average Glucose      <117 mg/dL 97    Hemoglobin A1C      <5.7 % 5.0    Calcium      8.8 - 10.4 mg/dL 9.1    Phosphorus      2.5 - 4.5 mg/dL 4.5    T4 Free      0.90 - 1.70 ng/dL 1.81 (H)    Testosterone Total      240 - 950 ng/dL 230 (L)    TSH      0.30 - 4.20 uIU/mL 1.73    Luteinizing Hormone      1.7 - 8.6 mIU/mL 6.1       Legend:  (H) High  (L) Low         Assessment and Plan:   1. Osteoporosis with vertebral compression fracture  2. Chronic steroid therapy  3. Duchenne muscular dystrophy   4. Hypocalcemia following zoledronate therapy  5. Delayed Puberty    Christopher has a history of vertebral compression fractures due to chronic steroid therapy, weakness, and decreased mobility related to Duchenne muscular dystrophy. Christopher had a hypocalcemic episode following bisphosphonate therapy requiring hospitalization. His most recent infusion occurred on 9/9/2021.  He did have some difficulty with hypocalcemia following the first infusion.  Due to his reaction, and no new fractures, we have not repeated the zoledronate infusion.  The repeat DXA scan today shows continued low bone mineral density with some improvement over time. We will plan to repeat the DXA at our next visit to help determine if any further zoledronate infusions would be warranted.     Boys with Duchenne muscular dystrophy have late pubertal development and may benefit from testosterone therapy with improvement in bone mineral density. Christopher completed testosterone therapy May 2020.  The testosterone therapy may also improve the bone mineral density over time.  Since puberty has not been significantly progressing, I recommended a second course of testosterone therapy.  We will reassess his ability to progress through puberty by repeating testosterone levels today.    MD  Instructions:  I recommend continuing the current dose of calcium. I recommend continuing the current dose of vitamin D.  Please get DXA before our next visit (the day of or the day before).     Tests to be obtained prior to next visit:  No orders of the defined types were placed in this encounter.    RTC for follow up evaluation in 9-12 months.     Thank you for allowing me to participate in the care of your patient.  Please do not hesitate to call with questions or concerns.    Sincerely,        The longitudinal plan of care for the diagnosis(es)/condition(s) as documented were addressed during this visit. Due to the added complexity in care, I will continue to support Christopher in the subsequent management and with ongoing continuity of care.   CC  Patient Care Team:  Jose Finn as PCP - General (Family Medicine)  Tray Cavazos MD as MD (Pediatric Neurology)  Selene Campos MD as MD (Pediatric Cardiology)  Jose Finn (Family Practice)  Melanie Delatorre MD as MD (INTERNAL MEDICINE - ENDOCRINOLOGY, DIABETES & METABOLISM)  Courtney Martinez MD as MD (Pediatric Pulmonology)  Tray Cavazos MD as Assigned Neuroscience Provider  Rachel Romero, RN as Registered Nurse  Courtney Martinez MD as Assigned Pediatric Specialist Provider  Antoni Gil MD as Assigned Musculoskeletal Provider     Parents of Christopher Gorman  [address] [place] [address] [address]  [facility] [address]

## 2025-07-24 NOTE — TELEPHONE ENCOUNTER
FUTURE VISIT INFORMATION      SURGERY INFORMATION:  Date: 8/13/25  Location: [facility]  Surgeon:  Antoni Gil MD   Anesthesia Type:  General  Procedure: Posterior instrumented spinal fusion thoracic 2 or 3 to pelvis; Smith-Malhotra osteotomies thoracic 12-lumbar 4; use of allograft.  Note: has 6 lumbar vertebrae.   Consult: 4/24/25    RECORDS REQUESTED FROM:       Primary Care Provider: Jose Finn    Pertinent Medical History:  - 7/14/20 Junior      Most recent EKG+ Tracing:  3.2.20 tracing   10.11.24 LEAD      Most recent ECHO:  10.11.24     10.11.24, 2.23.24  Most recent Sleep Study:

## 2025-07-25 ENCOUNTER — VIRTUAL VISIT (OUTPATIENT)
Dept: PEDIATRIC NEUROLOGY | Facility: CLINIC | Age: 20
End: 2025-07-25
Attending: PSYCHIATRY & NEUROLOGY
Payer: COMMERCIAL

## 2025-07-25 DIAGNOSIS — M85.80 OSTEOPENIA, UNSPECIFIED LOCATION: Primary | ICD-10-CM

## 2025-07-25 PROCEDURE — 98005 SYNCH AUDIO-VIDEO EST LOW 20: CPT | Performed by: STUDENT IN AN ORGANIZED HEALTH CARE EDUCATION/TRAINING PROGRAM

## 2025-07-25 PROCEDURE — G2211 COMPLEX E/M VISIT ADD ON: HCPCS | Mod: 95 | Performed by: STUDENT IN AN ORGANIZED HEALTH CARE EDUCATION/TRAINING PROGRAM

## 2025-07-25 NOTE — Clinical Note
7/25/2025      RE: Christopher Gorman  [address]  [facility], [facility] [address]     Dear Colleague,    Thank you for the opportunity to participate in the care of your patient, Christopher Gorman, at the [facility] at [facility]. Please see a copy of my visit note below.    Pediatric Endocrinology Follow-up Consultation    Patient: Christopher Gorman MRN# [id number]   YOB: 2005 Age: 19year 9month old   Date of Visit: Jul 25, 2025    Dear Dr. Jose Lucasuble:    I had the pleasure of seeing your patient, Christopher Gorman in the Pediatric Endocrinology Clinic, [facility], on Jul 25, 2025 for a follow-up consultation of vitamin D deficiency and osteoporosis secondary to chronic glucocortoid therapy.         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 He was previously seen by my colleague, Dr. Delatorre, and was last seen in 2018.     To review, he was diagnosed with DMD in April 2014. Steroid  treatment (prednisone) was started in 2014. Christopher was found to have a vertebral compression fracture and received zoledronate therapy in July 2019. He had severe hypocalcemia following the infusion requiring hospitalization.      Christopher was found to have a vertebral compression fracture and received zoledronate therapy in July 2019. He had severe hypocalcemia following the infusion requiring hospitalization.    INTERIM HISTORY: Since last visit on 11/17/2022, Christopher's health has been stable with no significant new concerns.     No recent falls. No recent back, muscle or joint pain.      Christopher takes 500 mg Oscal twice daily and 1000 mg Tums twice daily. He is also taking vitamin D 5000 daily.     He had his most recent zoledronate infusion on 9/9/2021.  Since the last zoledronate infusion Christopher has not had any new falls or back pain.     Christopher Gorman was diagnosed with Duchenne Muscular Dystrophy  in April 2014 at age ***.NAME@ was found to have *** mutation.      DMD treatment:  - Steroids: Steroid treatment (prednisone) was started in 2014. Christopher continues to take 5 20 mg tablets of prednisone twice weekly..   - Other tx: ***      Bone Health:  - Fracture history: There is *** no history of fractures. Big toe  - Back pain:  no pain now, sometimes hurts   - Ambulation:    - Last spine XR:   - Last DXA scan:   - Vitamin D and calcium:   - Treatments:       Growth and BMI:   Christopher Gorman's growth data was reviewed during his visit and showed that he had been tracking *** %ile for his length. Review of his weight showed that he had been tracking *** %ile until the past year where there's been an uptick in the rate of his weight gain. His BMI has been increasing and is now at the **** th%ile.     Puberty:  Christopher Gorman and his parent haven't *** noticed any signs of puberty including development of adult body odor, pubic hair. He has *** not noticed testicular or phallic enlargement.         History was obtained from Christopher  and his parents, his brother was also present.         Social History:   He graduated from high school.    Social history was reviewed and is unchanged. Refer to the initial note.         Family History:     Family History   Problem Relation Age of Onset    Thyroid Disease Mother     Diabetes Maternal Grandmother     Thyroid Disease Maternal Grandmother     Muscular Dystrophy Brother      Dad has recent kidney stones.    Family history was reviewed and is unchanged. Refer to the initial note.         Allergies:   No Known Allergies          Medications:     Current Outpatient Medications   Medication Sig Dispense Refill    acetaminophen (TYLENOL) 325 MG tablet Take 2 tablets (650 mg) by mouth every 6 hours as needed for mild pain or fever 1 Bottle 0    Ascorbic Acid (VITAMIN C PO) Take 3 tablets by mouth daily      CALCIUM ANTACID 500 MG chewable tablet 1,000 mg 2 times daily.  0    calcium carbonate 500 mg, elemental, (OSCAL) 500 MG tablet Take 2 tablets (1,000 mg) by mouth 2 times daily. 120 tablet 5    carvedilol (COREG) 25 MG tablet Take 1 tablet (25 mg) by mouth 2 times daily (with meals). 180 tablet 3    Cholecalciferol (VITAMIN D3) 50 MCG (2000 UT) CAPS Take 2 capsules by mouth daily 60 capsule 5    enalapril (VASOTEC) 10 MG tablet Take 1.5 tablets (15 mg) by mouth 2 times daily. 60 tablet 4    eplerenone (INSPRA) 25 MG tablet Take 1 tablet (25 mg) by mouth daily. 90 tablet 3    FLUoxetine (PROZAC) 10 MG capsule Take 10 mg by mouth daily      order for DME Sling for Micah Lift. 1 Units 0    predniSONE (DELTASONE) 20 MG tablet TAKE 5 TABLETS BY MOUTH TWICE WEEKLY 40 tablet 5             Review of Systems:   Gen: Negative  Eye: Negative, no vision concerns. Due for another eye appointment with good vision.   ENT: Negative, no hearing concerns. He had braces removed in July 2021. He has had issues with wisdom teeth. They are investigating options for surgery due to challenges related to mobility and  anesthesia.  Pulmonary: He uses a BIPAP machine some of the time at night (6 hours).  Cardio: Negative, no dizziness or fainting.   Gastrointestinal: Some constipation and is on Colace.  Hematologic: Negative, no bruising or bleeding concerns.  Genitourinary: Negative, no bladder concerns.  Musculoskeletal: He uses a electronic chair at all times except for when he uses a lift for the bathroom and transfers.   Psychiatric: Negative  Neurologic: Negative, no headaches. No seizures.   Skin: Dry skin due to eczema. A little acne on nose and chin.  Endocrine: see HPI. He does not shave his face yet.             Physical Exam:   There were no vitals taken for this visit.  Growth %ile SmartLinks can only be used for patients less than 20 years old.  Height: 0 cm   Facility age limit for growth %natalie is 20 years.  Weight: 89.3 kg (actual weight), Facility age limit for growth %natalie is 20 years. 165 lbs at the time of surgery for appendicitis.  BMI: There is no height or weight on file to calculate BMI. No height and weight on file for this encounter.     GENERAL:  He is alert and in no apparent distress. Mildly Cushingoid face.   HEENT:  Head is  normocephalic and atraumatic.  Pupils equal, round and reactive to light and accommodation.  Extraocular movements are intact.  Funduscopic exam shows crisp disc margins and normal venous pulsations.  Nares are clear.  Oropharynx shows normal dentition uvula and palate.  Tympanic membranes visualized and clear.   NECK:  Supple.  Thyroid was palpable, smooth with no nodules. It was not enlarged.    LUNGS:  Clear to auscultation bilaterally.   CARDIOVASCULAR:  Regular rate and rhythm without murmur, gallop or rub.   BREASTS:  Teo I.  Axillary hair, odor and sweat were absent.   ABDOMEN:  Nondistended.  Positive bowel sounds, soft and nontender.  No hepatosplenomegaly or masses palpable.   GENITOURINARY EXAM: Deferred, Exam was Teo V at previous visit.   MUSCULOSKELETAL: He is  wheelchair bound. No evidence of scoliosis. No pain to palpation and percussion of the spine lumbar spine.   NEUROLOGIC:  Cranial nerves II-XII tested and intact.   SKIN:  No evidence of acne or oiliness. No striae.  Orange colored macules on the back.         Laboratory results:     Component      Latest Ref Rng 5/23/2025  9:05 AM   25 OH Vit D2      ug/L <5    25 OH Vit D3      ug/L 37    25 OH Vit D total      20 - 75 ug/L <42    Cystatin C      0.6 - 1.0 mg/L 1.1 (H)    GFR Calculated with Cystatin C      >=60 mL/min/1.73m2 80    Estimated Average Glucose      <117 mg/dL 97    Hemoglobin A1C      <5.7 % 5.0    Calcium      8.8 - 10.4 mg/dL 9.1    Phosphorus      2.5 - 4.5 mg/dL 4.5    T4 Free      0.90 - 1.70 ng/dL 1.81 (H)    Testosterone Total      240 - 950 ng/dL 230 (L)    TSH      0.30 - 4.20 uIU/mL 1.73    Luteinizing Hormone      1.7 - 8.6 mIU/mL 6.1       Legend:  (H) High  (L) Low         Assessment and Plan:   1. Osteoporosis with vertebral compression fracture  2. Chronic steroid therapy  3. Duchenne muscular dystrophy   4. Hypocalcemia following zoledronate therapy  5. Delayed Puberty    Christopher has a history of vertebral compression fractures due to chronic steroid therapy, weakness, and decreased mobility related to Duchenne muscular dystrophy. Christopher had a hypocalcemic episode following bisphosphonate therapy requiring hospitalization. His most recent infusion occurred on 9/9/2021.  He did have some difficulty with hypocalcemia following the first infusion.  Due to his reaction, and no new fractures, we have not repeated the zoledronate infusion.  The repeat DXA scan today shows continued low bone mineral density with some improvement over time. We will plan to repeat the DXA at our next visit to help determine if any further zoledronate infusions would be warranted.     Boys with Duchenne muscular dystrophy have late pubertal development and may benefit from testosterone therapy with improvement in  bone mineral density. Christopher completed testosterone therapy May 2020.  The testosterone therapy may also improve the bone mineral density over time.  Since puberty has not been significantly progressing, I recommended a second course of testosterone therapy.  We will reassess his ability to progress through puberty by repeating testosterone levels today.    MD Instructions:  I recommend continuing the current dose of calcium. I recommend continuing the current dose of vitamin D.  Please get DXA before our next visit (the day of or the day before).     Tests to be obtained prior to next visit:  No orders of the defined types were placed in this encounter.    RTC for follow up evaluation in 9-12 months.     Thank you for allowing me to participate in the care of your patient.  Please do not hesitate to call with questions or concerns.    Sincerely,        The longitudinal plan of care for the diagnosis(es)/condition(s) as documented were addressed during this visit. Due to the added complexity in care, I will continue to support Christopher in the subsequent management and with ongoing continuity of care.   CC  Patient Care Team:  Jose Finn as PCP - General (Family Medicine)  Tray Cavazos MD as MD (Pediatric Neurology)  Selene Campos MD as MD (Pediatric Cardiology)  Jose Finn (Family Practice)  Melanie Delatorre MD as MD (INTERNAL MEDICINE - ENDOCRINOLOGY, DIABETES & METABOLISM)  Courtney Martinez MD as MD (Pediatric Pulmonology)  Tray Cavazos MD as Assigned Neuroscience Provider  Rachel Romero, RN as Registered Nurse  Courtney Martinez MD as Assigned Pediatric Specialist Provider  Antoni Gil MD as Assigned Musculoskeletal Provider     Parents of Christopher Sherif[address]  [facility] [address]                  Please do not hesitate to contact me if you have any questions/concerns.     Sincerely,       Leobardo Moe MD

## 2025-07-25 NOTE — PATIENT INSTRUCTIONS
1- Continue current vitamin D and calcium.  2- Will have kidney tests repeated when Christopher comes for surgery.  3- Will consider starting Fosamax after Christopher recovers from surgery.  4- Will recheck thyroid labs with the next blood draw.

## 2025-07-25 NOTE — NURSING NOTE
Christopher Gorman complains of    Chief Complaint   Patient presents with    RECHECK     Return Virtual appt       Patient would like the video invitation sent by: Other e-mail: my chart     Patient/Parent is located in [place]? Yes   Patient is present for visit Yes    I have reviewed and updated the patient's medication list, allergies and preferred pharmacy.      Tani Shen

## 2025-08-01 ENCOUNTER — ANCILLARY PROCEDURE (OUTPATIENT)
Dept: CT IMAGING | Facility: CLINIC | Age: 20
End: 2025-08-01
Attending: ORTHOPAEDIC SURGERY
Payer: COMMERCIAL

## 2025-08-01 DIAGNOSIS — M41.45 NEUROMUSCULAR SCOLIOSIS OF THORACOLUMBAR REGION: ICD-10-CM

## 2025-08-01 DIAGNOSIS — G71.01 DUCHENNE MUSCULAR DYSTROPHY (H): ICD-10-CM

## 2025-08-01 PROCEDURE — 72131 CT LUMBAR SPINE W/O DYE: CPT | Performed by: RADIOLOGY

## 2025-08-01 PROCEDURE — 72192 CT PELVIS W/O DYE: CPT | Performed by: RADIOLOGY

## 2025-08-07 ENCOUNTER — OFFICE VISIT (OUTPATIENT)
Dept: SURGERY | Facility: CLINIC | Age: 20
End: 2025-08-07
Payer: COMMERCIAL

## 2025-08-07 ENCOUNTER — LAB (OUTPATIENT)
Dept: LAB | Facility: CLINIC | Age: 20
End: 2025-08-07
Payer: COMMERCIAL

## 2025-08-07 ENCOUNTER — PRE VISIT (OUTPATIENT)
Dept: SURGERY | Facility: CLINIC | Age: 20
End: 2025-08-07

## 2025-08-07 VITALS
DIASTOLIC BLOOD PRESSURE: 62 MMHG | BODY MASS INDEX: 28.94 KG/M2 | WEIGHT: 196 LBS | HEART RATE: 75 BPM | OXYGEN SATURATION: 97 % | RESPIRATION RATE: 16 BRPM | TEMPERATURE: 98.1 F | SYSTOLIC BLOOD PRESSURE: 103 MMHG

## 2025-08-07 DIAGNOSIS — M41.55 OTHER SECONDARY SCOLIOSIS, THORACOLUMBAR REGION: ICD-10-CM

## 2025-08-07 DIAGNOSIS — Z01.818 PREOP EXAMINATION: ICD-10-CM

## 2025-08-07 DIAGNOSIS — Z01.818 PREOP EXAMINATION: Primary | ICD-10-CM

## 2025-08-07 DIAGNOSIS — M85.80 OSTEOPENIA, UNSPECIFIED LOCATION: ICD-10-CM

## 2025-08-07 LAB
ANION GAP SERPL CALCULATED.3IONS-SCNC: 12 MMOL/L (ref 7–15)
BUN SERPL-MCNC: 9.3 MG/DL (ref 6–20)
CALCIUM SERPL-MCNC: 9.7 MG/DL (ref 8.8–10.4)
CHLORIDE SERPL-SCNC: 98 MMOL/L (ref 98–107)
CREAT SERPL-MCNC: 0.22 MG/DL (ref 0.67–1.17)
CYSTATIN C (ROCHE): 1.2 MG/L (ref 0.6–1)
EGFRCR SERPLBLD CKD-EPI 2021: >90 ML/MIN/1.73M2
GFR/BSA.PRED SERPLBLD CYS-BASED-ARV: 72 ML/MIN/1.73M2
GLUCOSE SERPL-MCNC: 81 MG/DL (ref 70–99)
HCO3 SERPL-SCNC: 27 MMOL/L (ref 22–29)
POTASSIUM SERPL-SCNC: 4.5 MMOL/L (ref 3.4–5.3)
SODIUM SERPL-SCNC: 137 MMOL/L (ref 135–145)

## 2025-08-07 PROCEDURE — 99000 SPECIMEN HANDLING OFFICE-LAB: CPT | Performed by: PATHOLOGY

## 2025-08-07 PROCEDURE — 82610 CYSTATIN C: CPT | Performed by: STUDENT IN AN ORGANIZED HEALTH CARE EDUCATION/TRAINING PROGRAM

## 2025-08-07 ASSESSMENT — ENCOUNTER SYMPTOMS: ORTHOPNEA: 0

## 2025-08-07 ASSESSMENT — PAIN SCALES - GENERAL: PAINLEVEL_OUTOF10: NO PAIN (0)

## 2025-08-08 ENCOUNTER — TELEPHONE (OUTPATIENT)
Dept: NEUROLOGY | Facility: CLINIC | Age: 20
End: 2025-08-08
Payer: COMMERCIAL

## 2025-08-12 ASSESSMENT — ENCOUNTER SYMPTOMS: ORTHOPNEA: 0

## 2025-08-13 ENCOUNTER — HOSPITAL ENCOUNTER (INPATIENT)
Facility: CLINIC | Age: 20
End: 2025-08-13
Attending: ORTHOPAEDIC SURGERY | Admitting: ORTHOPAEDIC SURGERY
Payer: COMMERCIAL

## 2025-08-13 ENCOUNTER — APPOINTMENT (OUTPATIENT)
Dept: GENERAL RADIOLOGY | Facility: CLINIC | Age: 20
End: 2025-08-13
Attending: ORTHOPAEDIC SURGERY
Payer: COMMERCIAL

## 2025-08-13 ENCOUNTER — ANESTHESIA (OUTPATIENT)
Dept: SURGERY | Facility: CLINIC | Age: 20
End: 2025-08-13
Payer: COMMERCIAL

## 2025-08-13 DIAGNOSIS — Z98.890 S/P SPINAL SURGERY: Primary | ICD-10-CM

## 2025-08-13 DIAGNOSIS — G71.01 DUCHENNE MUSCULAR DYSTROPHY (H): Chronic | ICD-10-CM

## 2025-08-13 DIAGNOSIS — G71.01 CARDIOMYOPATHY IN DUCHENNE MUSCULAR DYSTROPHY (H): ICD-10-CM

## 2025-08-13 DIAGNOSIS — I43 CARDIOMYOPATHY IN DUCHENNE MUSCULAR DYSTROPHY (H): ICD-10-CM

## 2025-08-13 PROBLEM — M41.9 SCOLIOSIS: Status: ACTIVE | Noted: 2025-08-13

## 2025-08-13 LAB
ABO + RH BLD: NORMAL
ALBUMIN SERPL BCG-MCNC: 3.1 G/DL (ref 3.5–5.2)
ALLEN'S TEST: ABNORMAL
ALP SERPL-CCNC: 40 U/L (ref 40–150)
ALT SERPL W P-5'-P-CCNC: 25 U/L (ref 0–70)
ANION GAP SERPL CALCULATED.3IONS-SCNC: 11 MMOL/L (ref 7–15)
ANION GAP SERPL CALCULATED.3IONS-SCNC: 13 MMOL/L (ref 7–15)
APTT PPP: 27 SECONDS (ref 22–38)
APTT PPP: 27 SECONDS (ref 22–38)
AST SERPL W P-5'-P-CCNC: 44 U/L (ref 0–45)
BASE EXCESS BLDA CALC-SCNC: -0.4 MMOL/L (ref -3–3)
BASE EXCESS BLDA CALC-SCNC: -2.2 MMOL/L (ref -3–3)
BILIRUB SERPL-MCNC: 2 MG/DL
BLD GP AB SCN SERPL QL: NEGATIVE
BLD PROD TYP BPU: NORMAL
BLOOD COMPONENT TYPE: NORMAL
BUN SERPL-MCNC: 10.4 MG/DL (ref 6–20)
BUN SERPL-MCNC: 11 MG/DL (ref 6–20)
CA-I BLD-MCNC: 3.8 MG/DL (ref 4.4–5.2)
CA-I BLD-MCNC: 4.7 MG/DL (ref 4.4–5.2)
CALCIUM SERPL-MCNC: 7.8 MG/DL (ref 8.8–10.4)
CALCIUM SERPL-MCNC: 8.1 MG/DL (ref 8.8–10.4)
CHLORIDE SERPL-SCNC: 104 MMOL/L (ref 98–107)
CHLORIDE SERPL-SCNC: 107 MMOL/L (ref 98–107)
CODING SYSTEM: NORMAL
CREAT SERPL-MCNC: 0.18 MG/DL (ref 0.67–1.17)
CREAT SERPL-MCNC: 0.23 MG/DL (ref 0.67–1.17)
CROSSMATCH: NORMAL
EGFRCR SERPLBLD CKD-EPI 2021: >90 ML/MIN/1.73M2
EGFRCR SERPLBLD CKD-EPI 2021: >90 ML/MIN/1.73M2
ERYTHROCYTE [DISTWIDTH] IN BLOOD BY AUTOMATED COUNT: 13.9 % (ref 10–15)
ERYTHROCYTE [DISTWIDTH] IN BLOOD BY AUTOMATED COUNT: 14 % (ref 10–15)
ERYTHROCYTE [DISTWIDTH] IN BLOOD BY AUTOMATED COUNT: 14.1 % (ref 10–15)
FIBRINOGEN PPP-MCNC: 137 MG/DL (ref 170–510)
FIBRINOGEN PPP-MCNC: 173 MG/DL (ref 170–510)
GLUCOSE BLD-MCNC: 111 MG/DL (ref 70–99)
GLUCOSE BLDC GLUCOMTR-MCNC: 139 MG/DL (ref 70–99)
GLUCOSE BLDC GLUCOMTR-MCNC: 143 MG/DL (ref 70–99)
GLUCOSE BLDC GLUCOMTR-MCNC: 86 MG/DL (ref 70–99)
GLUCOSE SERPL-MCNC: 150 MG/DL (ref 70–99)
GLUCOSE SERPL-MCNC: 158 MG/DL (ref 70–99)
HCO3 BLD-SCNC: 25 MMOL/L (ref 21–28)
HCO3 BLDA-SCNC: 22 MMOL/L (ref 21–28)
HCO3 SERPL-SCNC: 22 MMOL/L (ref 22–29)
HCO3 SERPL-SCNC: 23 MMOL/L (ref 22–29)
HCT VFR BLD AUTO: 34.4 % (ref 40–53)
HCT VFR BLD AUTO: 36.5 % (ref 40–53)
HCT VFR BLD AUTO: 42.1 % (ref 40–53)
HGB BLD-MCNC: 11.9 G/DL (ref 13.3–17.7)
HGB BLD-MCNC: 12.7 G/DL (ref 13.3–17.7)
HGB BLD-MCNC: 13.4 G/DL (ref 13.3–17.7)
HGB BLD-MCNC: 8.4 G/DL (ref 13.3–17.7)
INR PPP: 1.25 (ref 0.85–1.15)
INR PPP: 1.26 (ref 0.85–1.15)
ISSUE DATE AND TIME: NORMAL
LACTATE BLD-SCNC: 3.7 MMOL/L (ref 0.7–2)
LACTATE SERPL-SCNC: 3.4 MMOL/L (ref 0.7–2)
MAGNESIUM SERPL-MCNC: 1.8 MG/DL (ref 1.7–2.3)
MCH RBC QN AUTO: 27.8 PG (ref 26.5–33)
MCH RBC QN AUTO: 29.2 PG (ref 26.5–33)
MCH RBC QN AUTO: 29.5 PG (ref 26.5–33)
MCHC RBC AUTO-ENTMCNC: 31.8 G/DL (ref 31.5–36.5)
MCHC RBC AUTO-ENTMCNC: 34.6 G/DL (ref 31.5–36.5)
MCHC RBC AUTO-ENTMCNC: 34.8 G/DL (ref 31.5–36.5)
MCV RBC AUTO: 84.5 FL (ref 78–100)
MCV RBC AUTO: 84.7 FL (ref 78–100)
MCV RBC AUTO: 87.3 FL (ref 78–100)
O2/TOTAL GAS SETTING VFR VENT: 100 %
O2/TOTAL GAS SETTING VFR VENT: 21 %
OXYHGB MFR BLDA: 94 % (ref 92–100)
OXYHGB MFR BLDA: 99 % (ref 92–100)
PCO2 BLD: 43 MM HG (ref 35–45)
PCO2 BLDA: 35 MM HG (ref 35–45)
PH BLD: 7.37 [PH] (ref 7.35–7.45)
PH BLDA: 7.41 [PH] (ref 7.35–7.45)
PHOSPHATE SERPL-MCNC: 3.6 MG/DL (ref 2.5–4.5)
PLATELET # BLD AUTO: 127 10E3/UL (ref 150–450)
PLATELET # BLD AUTO: 305 10E3/UL (ref 150–450)
PLATELET # BLD AUTO: 91 10E3/UL (ref 150–450)
PO2 BLD: 74 MM HG (ref 80–105)
PO2 BLDA: 536 MM HG (ref 80–105)
POTASSIUM BLD-SCNC: 4.1 MMOL/L (ref 3.4–5.3)
POTASSIUM SERPL-SCNC: 4.1 MMOL/L (ref 3.4–5.3)
POTASSIUM SERPL-SCNC: 4.2 MMOL/L (ref 3.4–5.3)
PROT SERPL-MCNC: 4.3 G/DL (ref 6.4–8.3)
PROTHROMBIN TIME: 16.2 SECONDS (ref 11.8–14.8)
PROTHROMBIN TIME: 16.2 SECONDS (ref 11.8–14.8)
RBC # BLD AUTO: 4.07 10E6/UL (ref 4.4–5.9)
RBC # BLD AUTO: 4.31 10E6/UL (ref 4.4–5.9)
RBC # BLD AUTO: 4.82 10E6/UL (ref 4.4–5.9)
SAO2 % BLDA: 100 % (ref 96–97)
SAO2 % BLDA: 95.9 % (ref 96–97)
SODIUM BLD-SCNC: 137 MMOL/L (ref 135–145)
SODIUM SERPL-SCNC: 139 MMOL/L (ref 135–145)
SODIUM SERPL-SCNC: 141 MMOL/L (ref 135–145)
SPECIMEN EXP DATE BLD: NORMAL
TROPONIN T SERPL HS-MCNC: 109 NG/L
TROPONIN T SERPL HS-MCNC: 99 NG/L
UNIT ABO/RH: NORMAL
UNIT NUMBER: NORMAL
UNIT STATUS: NORMAL
UNIT TYPE ISBT: 5100
UNIT TYPE ISBT: 6200
UNIT TYPE ISBT: 7300
WBC # BLD AUTO: 12.95 10E3/UL (ref 4–11)
WBC # BLD AUTO: 16.86 10E3/UL (ref 4–11)
WBC # BLD AUTO: 17.07 10E3/UL (ref 4–11)

## 2025-08-13 PROCEDURE — 250N000011 HC RX IP 250 OP 636: Mod: JZ | Performed by: STUDENT IN AN ORGANIZED HEALTH CARE EDUCATION/TRAINING PROGRAM

## 2025-08-13 PROCEDURE — 4A11X4G MONITORING OF PERIPHERAL NERVOUS ELECTRICAL ACTIVITY, INTRAOPERATIVE, EXTERNAL APPROACH: ICD-10-PCS | Performed by: ORTHOPAEDIC SURGERY

## 2025-08-13 PROCEDURE — 258N000003 HC RX IP 258 OP 636: Performed by: STUDENT IN AN ORGANIZED HEALTH CARE EDUCATION/TRAINING PROGRAM

## 2025-08-13 PROCEDURE — 22214 INCIS 1 VERTEBRAL SEG LUMBAR: CPT | Mod: GC | Performed by: ORTHOPAEDIC SURGERY

## 2025-08-13 PROCEDURE — 27280 ARTHR SI JT OPN B1GRF INSTRM: CPT | Mod: 50 | Performed by: ORTHOPAEDIC SURGERY

## 2025-08-13 PROCEDURE — 86923 COMPATIBILITY TEST ELECTRIC: CPT

## 2025-08-13 PROCEDURE — 82805 BLOOD GASES W/O2 SATURATION: CPT | Performed by: NURSE PRACTITIONER

## 2025-08-13 PROCEDURE — 250N000011 HC RX IP 250 OP 636: Performed by: STUDENT IN AN ORGANIZED HEALTH CARE EDUCATION/TRAINING PROGRAM

## 2025-08-13 PROCEDURE — 01N80ZZ RELEASE THORACIC NERVE, OPEN APPROACH: ICD-10-PCS | Performed by: ORTHOPAEDIC SURGERY

## 2025-08-13 PROCEDURE — 83605 ASSAY OF LACTIC ACID: CPT | Performed by: NURSE PRACTITIONER

## 2025-08-13 PROCEDURE — 85384 FIBRINOGEN ACTIVITY: CPT

## 2025-08-13 PROCEDURE — 250N000011 HC RX IP 250 OP 636: Performed by: NURSE ANESTHETIST, CERTIFIED REGISTERED

## 2025-08-13 PROCEDURE — 85027 COMPLETE CBC AUTOMATED: CPT | Performed by: NURSE PRACTITIONER

## 2025-08-13 PROCEDURE — 84295 ASSAY OF SERUM SODIUM: CPT | Performed by: ANESTHESIOLOGY

## 2025-08-13 PROCEDURE — 258N000003 HC RX IP 258 OP 636: Performed by: NURSE ANESTHETIST, CERTIFIED REGISTERED

## 2025-08-13 PROCEDURE — 0RG8071 FUSION OF 8 OR MORE THORACIC VERTEBRAL JOINTS WITH AUTOLOGOUS TISSUE SUBSTITUTE, POSTERIOR APPROACH, POSTERIOR COLUMN, OPEN APPROACH: ICD-10-PCS | Performed by: ORTHOPAEDIC SURGERY

## 2025-08-13 PROCEDURE — 0SG3071 FUSION OF LUMBOSACRAL JOINT WITH AUTOLOGOUS TISSUE SUBSTITUTE, POSTERIOR APPROACH, POSTERIOR COLUMN, OPEN APPROACH: ICD-10-PCS | Performed by: ORTHOPAEDIC SURGERY

## 2025-08-13 PROCEDURE — 85610 PROTHROMBIN TIME: CPT | Performed by: ANESTHESIOLOGY

## 2025-08-13 PROCEDURE — 272N000001 HC OR GENERAL SUPPLY STERILE: Performed by: ORTHOPAEDIC SURGERY

## 2025-08-13 PROCEDURE — 710N000011 HC RECOVERY PHASE 1, LEVEL 3, PER MIN: Performed by: ORTHOPAEDIC SURGERY

## 2025-08-13 PROCEDURE — P9016 RBC LEUKOCYTES REDUCED: HCPCS | Performed by: STUDENT IN AN ORGANIZED HEALTH CARE EDUCATION/TRAINING PROGRAM

## 2025-08-13 PROCEDURE — 250N000009 HC RX 250: Performed by: NURSE ANESTHETIST, CERTIFIED REGISTERED

## 2025-08-13 PROCEDURE — 250N000013 HC RX MED GY IP 250 OP 250 PS 637: Performed by: NURSE PRACTITIONER

## 2025-08-13 PROCEDURE — 82610 CYSTATIN C: CPT | Performed by: NURSE PRACTITIONER

## 2025-08-13 PROCEDURE — 258N000003 HC RX IP 258 OP 636: Performed by: NURSE PRACTITIONER

## 2025-08-13 PROCEDURE — 250N000009 HC RX 250: Performed by: ORTHOPAEDIC SURGERY

## 2025-08-13 PROCEDURE — 250N000011 HC RX IP 250 OP 636: Mod: JZ | Performed by: NURSE ANESTHETIST, CERTIFIED REGISTERED

## 2025-08-13 PROCEDURE — 84484 ASSAY OF TROPONIN QUANT: CPT | Performed by: NURSE PRACTITIONER

## 2025-08-13 PROCEDURE — 250N000011 HC RX IP 250 OP 636: Performed by: PHYSICIAN ASSISTANT

## 2025-08-13 PROCEDURE — A6010 COLLAGEN BASED WOUND FILLER: HCPCS | Performed by: ORTHOPAEDIC SURGERY

## 2025-08-13 PROCEDURE — 999N000182 XR SURGERY OARM

## 2025-08-13 PROCEDURE — 83735 ASSAY OF MAGNESIUM: CPT | Performed by: NURSE PRACTITIONER

## 2025-08-13 PROCEDURE — 22216 INCIS ADDL SPINE SEGMENT: CPT | Mod: GC | Performed by: ORTHOPAEDIC SURGERY

## 2025-08-13 PROCEDURE — 86901 BLOOD TYPING SEROLOGIC RH(D): CPT | Performed by: NURSE PRACTITIONER

## 2025-08-13 PROCEDURE — C1762 CONN TISS, HUMAN(INC FASCIA): HCPCS | Performed by: ORTHOPAEDIC SURGERY

## 2025-08-13 PROCEDURE — 20930 SP BONE ALGRFT MORSEL ADD-ON: CPT | Mod: GC | Performed by: ORTHOPAEDIC SURGERY

## 2025-08-13 PROCEDURE — 82330 ASSAY OF CALCIUM: CPT | Performed by: NURSE PRACTITIONER

## 2025-08-13 PROCEDURE — 8E0WXBF COMPUTER ASSISTED PROCEDURE OF TRUNK REGION, WITH FLUOROSCOPY: ICD-10-PCS | Performed by: ORTHOPAEDIC SURGERY

## 2025-08-13 PROCEDURE — P9045 ALBUMIN (HUMAN), 5%, 250 ML: HCPCS | Mod: JZ | Performed by: NURSE ANESTHETIST, CERTIFIED REGISTERED

## 2025-08-13 PROCEDURE — 22804 ARTHRD PST DFRM 13+ VRT SGM: CPT | Mod: GC | Performed by: ORTHOPAEDIC SURGERY

## 2025-08-13 PROCEDURE — P9059 PLASMA, FRZ BETWEEN 8-24HOUR: HCPCS | Performed by: ANESTHESIOLOGY

## 2025-08-13 PROCEDURE — 250N000013 HC RX MED GY IP 250 OP 250 PS 637: Performed by: PHYSICIAN ASSISTANT

## 2025-08-13 PROCEDURE — 01NR0ZZ RELEASE SACRAL NERVE, OPEN APPROACH: ICD-10-PCS | Performed by: ORTHOPAEDIC SURGERY

## 2025-08-13 PROCEDURE — 85027 COMPLETE CBC AUTOMATED: CPT | Performed by: ANESTHESIOLOGY

## 2025-08-13 PROCEDURE — 272N000064 HC CIRCUIT HUMIDITY W/CPAP BIPAP

## 2025-08-13 PROCEDURE — XRGF058 FUSION OF LEFT SACROILIAC JOINT USING INTERNAL FIXATION DEVICE WITH TULIP CONNECTOR, OPEN APPROACH, NEW TECHNOLOGY GROUP 8: ICD-10-PCS | Performed by: ORTHOPAEDIC SURGERY

## 2025-08-13 PROCEDURE — 250N000011 HC RX IP 250 OP 636: Performed by: NURSE PRACTITIONER

## 2025-08-13 PROCEDURE — 94660 CPAP INITIATION&MGMT: CPT

## 2025-08-13 PROCEDURE — 999N000141 HC STATISTIC PRE-PROCEDURE NURSING ASSESSMENT: Performed by: ORTHOPAEDIC SURGERY

## 2025-08-13 PROCEDURE — 99222 1ST HOSP IP/OBS MODERATE 55: CPT | Mod: 25 | Performed by: NURSE PRACTITIONER

## 2025-08-13 PROCEDURE — 250N000011 HC RX IP 250 OP 636: Performed by: ORTHOPAEDIC SURGERY

## 2025-08-13 PROCEDURE — 258N000003 HC RX IP 258 OP 636: Performed by: ORTHOPAEDIC SURGERY

## 2025-08-13 PROCEDURE — 250N000009 HC RX 250: Performed by: STUDENT IN AN ORGANIZED HEALTH CARE EDUCATION/TRAINING PROGRAM

## 2025-08-13 PROCEDURE — 20936 SP BONE AGRFT LOCAL ADD-ON: CPT | Mod: GC | Performed by: ORTHOPAEDIC SURGERY

## 2025-08-13 PROCEDURE — 80048 BASIC METABOLIC PNL TOTAL CA: CPT

## 2025-08-13 PROCEDURE — 999N000157 HC STATISTIC RCP TIME EA 10 MIN

## 2025-08-13 PROCEDURE — P9059 PLASMA, FRZ BETWEEN 8-24HOUR: HCPCS

## 2025-08-13 PROCEDURE — 01NB0ZZ RELEASE LUMBAR NERVE, OPEN APPROACH: ICD-10-PCS | Performed by: ORTHOPAEDIC SURGERY

## 2025-08-13 PROCEDURE — 61783 SCAN PROC SPINAL: CPT | Mod: GC | Performed by: ORTHOPAEDIC SURGERY

## 2025-08-13 PROCEDURE — 258N000003 HC RX IP 258 OP 636: Performed by: SPECIALIST/TECHNOLOGIST

## 2025-08-13 PROCEDURE — XRGE058 FUSION OF RIGHT SACROILIAC JOINT USING INTERNAL FIXATION DEVICE WITH TULIP CONNECTOR, OPEN APPROACH, NEW TECHNOLOGY GROUP 8: ICD-10-PCS | Performed by: ORTHOPAEDIC SURGERY

## 2025-08-13 PROCEDURE — 250N000009 HC RX 250: Performed by: PHYSICIAN ASSISTANT

## 2025-08-13 PROCEDURE — C1713 ANCHOR/SCREW BN/BN,TIS/BN: HCPCS | Performed by: ORTHOPAEDIC SURGERY

## 2025-08-13 PROCEDURE — 84295 ASSAY OF SERUM SODIUM: CPT | Performed by: NURSE PRACTITIONER

## 2025-08-13 PROCEDURE — 85730 THROMBOPLASTIN TIME PARTIAL: CPT

## 2025-08-13 PROCEDURE — 36415 COLL VENOUS BLD VENIPUNCTURE: CPT | Performed by: NURSE PRACTITIONER

## 2025-08-13 PROCEDURE — 85610 PROTHROMBIN TIME: CPT

## 2025-08-13 PROCEDURE — 86923 COMPATIBILITY TEST ELECTRIC: CPT | Performed by: STUDENT IN AN ORGANIZED HEALTH CARE EDUCATION/TRAINING PROGRAM

## 2025-08-13 PROCEDURE — 0PS404Z REPOSITION THORACIC VERTEBRA WITH INTERNAL FIXATION DEVICE, OPEN APPROACH: ICD-10-PCS | Performed by: ORTHOPAEDIC SURGERY

## 2025-08-13 PROCEDURE — 0RGA071 FUSION OF THORACOLUMBAR VERTEBRAL JOINT WITH AUTOLOGOUS TISSUE SUBSTITUTE, POSTERIOR APPROACH, POSTERIOR COLUMN, OPEN APPROACH: ICD-10-PCS | Performed by: ORTHOPAEDIC SURGERY

## 2025-08-13 PROCEDURE — 250N000009 HC RX 250: Performed by: SPECIALIST/TECHNOLOGIST

## 2025-08-13 PROCEDURE — 86923 COMPATIBILITY TEST ELECTRIC: CPT | Performed by: ANESTHESIOLOGY

## 2025-08-13 PROCEDURE — 22844 INSERT SPINE FIXATION DEVICE: CPT | Mod: GC | Performed by: ORTHOPAEDIC SURGERY

## 2025-08-13 PROCEDURE — 0SG1071 FUSION OF 2 OR MORE LUMBAR VERTEBRAL JOINTS WITH AUTOLOGOUS TISSUE SUBSTITUTE, POSTERIOR APPROACH, POSTERIOR COLUMN, OPEN APPROACH: ICD-10-PCS | Performed by: ORTHOPAEDIC SURGERY

## 2025-08-13 PROCEDURE — 85384 FIBRINOGEN ACTIVITY: CPT | Performed by: ANESTHESIOLOGY

## 2025-08-13 PROCEDURE — 93005 ELECTROCARDIOGRAM TRACING: CPT

## 2025-08-13 PROCEDURE — 258N000003 HC RX IP 258 OP 636: Performed by: PHYSICIAN ASSISTANT

## 2025-08-13 PROCEDURE — 86923 COMPATIBILITY TEST ELECTRIC: CPT | Performed by: NURSE PRACTITIONER

## 2025-08-13 PROCEDURE — P9016 RBC LEUKOCYTES REDUCED: HCPCS

## 2025-08-13 PROCEDURE — 85730 THROMBOPLASTIN TIME PARTIAL: CPT | Performed by: ANESTHESIOLOGY

## 2025-08-13 PROCEDURE — 84100 ASSAY OF PHOSPHORUS: CPT | Performed by: NURSE PRACTITIONER

## 2025-08-13 PROCEDURE — 370N000017 HC ANESTHESIA TECHNICAL FEE, PER MIN: Performed by: ORTHOPAEDIC SURGERY

## 2025-08-13 PROCEDURE — 200N000002 HC R&B ICU UMMC

## 2025-08-13 PROCEDURE — P9037 PLATE PHERES LEUKOREDU IRRAD: HCPCS | Performed by: NURSE PRACTITIONER

## 2025-08-13 PROCEDURE — 360N000086 HC SURGERY LEVEL 6 W/ FLUORO, PER MIN: Performed by: ORTHOPAEDIC SURGERY

## 2025-08-13 PROCEDURE — P9016 RBC LEUKOCYTES REDUCED: HCPCS | Performed by: ANESTHESIOLOGY

## 2025-08-13 DEVICE — IMP SCR MEDT 5.5/6.0MM SOLERA 5.0X35MM MA 55840005035: Type: IMPLANTABLE DEVICE | Site: SPINE THORACIC | Status: FUNCTIONAL

## 2025-08-13 DEVICE — IMP SCR MEDT 5.5/6.0MM SOLERA 5.5X45MM MA 55840005545: Type: IMPLANTABLE DEVICE | Site: SPINE THORACIC | Status: FUNCTIONAL

## 2025-08-13 DEVICE — IMPLANTABLE DEVICE: Type: IMPLANTABLE DEVICE | Site: SPINE THORACIC | Status: FUNCTIONAL

## 2025-08-13 DEVICE — IMP SCR MEDT 5.5/6.0MM SOLERA 4.5X30MM MA 55840004530: Type: IMPLANTABLE DEVICE | Site: SPINE THORACIC | Status: FUNCTIONAL

## 2025-08-13 DEVICE — IMP SCR MEDT 5.5/6.0MM SOLERA 7.5X40MM MA 55840007540: Type: IMPLANTABLE DEVICE | Site: SPINE THORACIC | Status: FUNCTIONAL

## 2025-08-13 DEVICE — IMP SCR MEDT 5.5/6.0MM SOLERA 5.5X40MM MA 55840005540: Type: IMPLANTABLE DEVICE | Site: SPINE THORACIC | Status: FUNCTIONAL

## 2025-08-13 DEVICE — IMP SCR MEDT 5.5/6.0MM SOLERA 5.0X30MM MA 55840005030: Type: IMPLANTABLE DEVICE | Site: SPINE THORACIC | Status: FUNCTIONAL

## 2025-08-13 DEVICE — IMP SCR MEDT 5.5/6.0MM SOLERA 7.5X35MM MA 55840007535: Type: IMPLANTABLE DEVICE | Site: SPINE THORACIC | Status: FUNCTIONAL

## 2025-08-13 DEVICE — IMPLANTABLE DEVICE: Type: IMPLANTABLE DEVICE | Site: PELVIS | Status: FUNCTIONAL

## 2025-08-13 DEVICE — GRAFT BN CANC 30CC CRSH 1-10MM 800104: Type: IMPLANTABLE DEVICE | Site: SPINE THORACIC | Status: FUNCTIONAL

## 2025-08-13 DEVICE — IMP SCR MEDT 5.5/6.0MM SOLERA 5.5X50MM MA 55840005550: Type: IMPLANTABLE DEVICE | Site: SPINE THORACIC | Status: FUNCTIONAL

## 2025-08-13 DEVICE — IMP SCR SET MEDT SOLERA BREAK OFF 5.5MM TI 5540030: Type: IMPLANTABLE DEVICE | Site: SPINE THORACIC | Status: FUNCTIONAL

## 2025-08-13 DEVICE — IMP SCR MEDT 5.5/6.0MM SOLERA 6.5X50MM MA 55840006550: Type: IMPLANTABLE DEVICE | Site: SPINE THORACIC | Status: FUNCTIONAL

## 2025-08-13 DEVICE — IMP SCR MEDT BALLAST 9.5X80MM 25840019580: Type: IMPLANTABLE DEVICE | Site: PELVIS | Status: FUNCTIONAL

## 2025-08-13 DEVICE — IMP SCR MEDT 5.5/6.0MM SOLERA 7.5X45MM MA 55840007545: Type: IMPLANTABLE DEVICE | Site: SPINE THORACIC | Status: FUNCTIONAL

## 2025-08-13 DEVICE — IMP SCR MEDT 5.5/6.0MM SOLERA 5.5X35MM MA 55840005535: Type: IMPLANTABLE DEVICE | Site: SPINE THORACIC | Status: FUNCTIONAL

## 2025-08-13 RX ORDER — OXYCODONE HYDROCHLORIDE 10 MG/1
10 TABLET ORAL
Status: DISCONTINUED | OUTPATIENT
Start: 2025-08-13 | End: 2025-08-13 | Stop reason: HOSPADM

## 2025-08-13 RX ORDER — ONDANSETRON 2 MG/ML
4 INJECTION INTRAMUSCULAR; INTRAVENOUS EVERY 6 HOURS PRN
Status: DISCONTINUED | OUTPATIENT
Start: 2025-08-13 | End: 2025-08-20 | Stop reason: HOSPADM

## 2025-08-13 RX ORDER — ACETAMINOPHEN 325 MG/1
975 TABLET ORAL ONCE
Status: COMPLETED | OUTPATIENT
Start: 2025-08-13 | End: 2025-08-13

## 2025-08-13 RX ORDER — NALOXONE HYDROCHLORIDE 0.4 MG/ML
0.2 INJECTION, SOLUTION INTRAMUSCULAR; INTRAVENOUS; SUBCUTANEOUS
Status: DISCONTINUED | OUTPATIENT
Start: 2025-08-13 | End: 2025-08-20 | Stop reason: HOSPADM

## 2025-08-13 RX ORDER — METOPROLOL TARTRATE 1 MG/ML
5 INJECTION, SOLUTION INTRAVENOUS EVERY 6 HOURS
Status: DISCONTINUED | OUTPATIENT
Start: 2025-08-14 | End: 2025-08-16

## 2025-08-13 RX ORDER — HYDROMORPHONE HYDROCHLORIDE 1 MG/ML
0.3 INJECTION, SOLUTION INTRAMUSCULAR; INTRAVENOUS; SUBCUTANEOUS ONCE
Status: COMPLETED | OUTPATIENT
Start: 2025-08-13 | End: 2025-08-13

## 2025-08-13 RX ORDER — HYDRALAZINE HYDROCHLORIDE 20 MG/ML
2.5-5 INJECTION INTRAMUSCULAR; INTRAVENOUS EVERY 10 MIN PRN
Status: DISCONTINUED | OUTPATIENT
Start: 2025-08-13 | End: 2025-08-13 | Stop reason: HOSPADM

## 2025-08-13 RX ORDER — CEFAZOLIN SODIUM/WATER 2 G/20 ML
2 SYRINGE (ML) INTRAVENOUS EVERY 8 HOURS
Status: DISCONTINUED | OUTPATIENT
Start: 2025-08-13 | End: 2025-08-13

## 2025-08-13 RX ORDER — ONDANSETRON 4 MG/1
4 TABLET, ORALLY DISINTEGRATING ORAL EVERY 30 MIN PRN
Status: DISCONTINUED | OUTPATIENT
Start: 2025-08-13 | End: 2025-08-13 | Stop reason: HOSPADM

## 2025-08-13 RX ORDER — SODIUM CHLORIDE, SODIUM LACTATE, POTASSIUM CHLORIDE, CALCIUM CHLORIDE 600; 310; 30; 20 MG/100ML; MG/100ML; MG/100ML; MG/100ML
INJECTION, SOLUTION INTRAVENOUS CONTINUOUS PRN
Status: DISCONTINUED | OUTPATIENT
Start: 2025-08-13 | End: 2025-08-13

## 2025-08-13 RX ORDER — ONDANSETRON 2 MG/ML
4 INJECTION INTRAMUSCULAR; INTRAVENOUS EVERY 30 MIN PRN
Status: DISCONTINUED | OUTPATIENT
Start: 2025-08-13 | End: 2025-08-13 | Stop reason: HOSPADM

## 2025-08-13 RX ORDER — EPHEDRINE SULFATE 50 MG/ML
INJECTION, SOLUTION INTRAMUSCULAR; INTRAVENOUS; SUBCUTANEOUS PRN
Status: DISCONTINUED | OUTPATIENT
Start: 2025-08-13 | End: 2025-08-13

## 2025-08-13 RX ORDER — VITAMIN B COMPLEX
100 TABLET ORAL DAILY
Status: DISCONTINUED | OUTPATIENT
Start: 2025-08-14 | End: 2025-08-13

## 2025-08-13 RX ORDER — NALOXONE HYDROCHLORIDE 0.4 MG/ML
0.1 INJECTION, SOLUTION INTRAMUSCULAR; INTRAVENOUS; SUBCUTANEOUS
Status: DISCONTINUED | OUTPATIENT
Start: 2025-08-13 | End: 2025-08-13 | Stop reason: HOSPADM

## 2025-08-13 RX ORDER — FENTANYL CITRATE 50 UG/ML
INJECTION, SOLUTION INTRAMUSCULAR; INTRAVENOUS PRN
Status: DISCONTINUED | OUTPATIENT
Start: 2025-08-13 | End: 2025-08-13

## 2025-08-13 RX ORDER — DEXAMETHASONE SODIUM PHOSPHATE 4 MG/ML
4 INJECTION, SOLUTION INTRA-ARTICULAR; INTRALESIONAL; INTRAMUSCULAR; INTRAVENOUS; SOFT TISSUE
Status: DISCONTINUED | OUTPATIENT
Start: 2025-08-13 | End: 2025-08-13 | Stop reason: HOSPADM

## 2025-08-13 RX ORDER — CEFAZOLIN SODIUM/WATER 2 G/20 ML
2 SYRINGE (ML) INTRAVENOUS
Status: COMPLETED | OUTPATIENT
Start: 2025-08-13 | End: 2025-08-13

## 2025-08-13 RX ORDER — ACETAMINOPHEN 325 MG/1
975 TABLET ORAL ONCE
Status: DISCONTINUED | OUTPATIENT
Start: 2025-08-13 | End: 2025-08-13 | Stop reason: HOSPADM

## 2025-08-13 RX ORDER — LIDOCAINE 40 MG/G
CREAM TOPICAL
Status: DISCONTINUED | OUTPATIENT
Start: 2025-08-13 | End: 2025-08-20 | Stop reason: HOSPADM

## 2025-08-13 RX ORDER — PROPOFOL 10 MG/ML
INJECTION, EMULSION INTRAVENOUS PRN
Status: DISCONTINUED | OUTPATIENT
Start: 2025-08-13 | End: 2025-08-13

## 2025-08-13 RX ORDER — CEFAZOLIN SODIUM/WATER 2 G/20 ML
2 SYRINGE (ML) INTRAVENOUS SEE ADMIN INSTRUCTIONS
Status: DISCONTINUED | OUTPATIENT
Start: 2025-08-13 | End: 2025-08-13 | Stop reason: HOSPADM

## 2025-08-13 RX ORDER — CEFAZOLIN SODIUM 2 G/50ML
2 SOLUTION INTRAVENOUS EVERY 8 HOURS
Status: COMPLETED | OUTPATIENT
Start: 2025-08-13 | End: 2025-08-15

## 2025-08-13 RX ORDER — PROPOFOL 10 MG/ML
INJECTION, EMULSION INTRAVENOUS CONTINUOUS PRN
Status: DISCONTINUED | OUTPATIENT
Start: 2025-08-13 | End: 2025-08-13

## 2025-08-13 RX ORDER — CARBOXYMETHYLCELLULOSE SODIUM 5 MG/ML
1 SOLUTION/ DROPS OPHTHALMIC
Status: DISCONTINUED | OUTPATIENT
Start: 2025-08-13 | End: 2025-08-20 | Stop reason: HOSPADM

## 2025-08-13 RX ORDER — THIAMINE HYDROCHLORIDE 100 MG/ML
100 INJECTION, SOLUTION INTRAMUSCULAR; INTRAVENOUS ONCE
Status: COMPLETED | OUTPATIENT
Start: 2025-08-13 | End: 2025-08-13

## 2025-08-13 RX ORDER — DIPHENHYDRAMINE HYDROCHLORIDE 50 MG/ML
25 INJECTION, SOLUTION INTRAMUSCULAR; INTRAVENOUS EVERY 6 HOURS PRN
Status: DISCONTINUED | OUTPATIENT
Start: 2025-08-13 | End: 2025-08-13 | Stop reason: HOSPADM

## 2025-08-13 RX ORDER — SODIUM CHLORIDE, SODIUM LACTATE, POTASSIUM CHLORIDE, CALCIUM CHLORIDE 600; 310; 30; 20 MG/100ML; MG/100ML; MG/100ML; MG/100ML
INJECTION, SOLUTION INTRAVENOUS CONTINUOUS
Status: DISCONTINUED | OUTPATIENT
Start: 2025-08-13 | End: 2025-08-13 | Stop reason: HOSPADM

## 2025-08-13 RX ORDER — GABAPENTIN 100 MG/1
300 CAPSULE ORAL
Status: COMPLETED | OUTPATIENT
Start: 2025-08-13 | End: 2025-08-13

## 2025-08-13 RX ORDER — AMOXICILLIN 250 MG
2 CAPSULE ORAL 2 TIMES DAILY
Status: DISCONTINUED | OUTPATIENT
Start: 2025-08-13 | End: 2025-08-20 | Stop reason: HOSPADM

## 2025-08-13 RX ORDER — MAGNESIUM HYDROXIDE 1200 MG/15ML
LIQUID ORAL PRN
Status: DISCONTINUED | OUTPATIENT
Start: 2025-08-13 | End: 2025-08-13 | Stop reason: HOSPADM

## 2025-08-13 RX ORDER — CALCIUM CARBONATE 500 MG/1
500 TABLET, CHEWABLE ORAL 4 TIMES DAILY PRN
Status: DISCONTINUED | OUTPATIENT
Start: 2025-08-13 | End: 2025-08-20 | Stop reason: HOSPADM

## 2025-08-13 RX ORDER — CARBOXYMETHYLCELLULOSE SODIUM 5 MG/ML
1 SOLUTION/ DROPS OPHTHALMIC
Status: CANCELLED | OUTPATIENT
Start: 2025-08-13

## 2025-08-13 RX ORDER — ONDANSETRON 4 MG/1
4 TABLET, ORALLY DISINTEGRATING ORAL EVERY 6 HOURS PRN
Status: DISCONTINUED | OUTPATIENT
Start: 2025-08-13 | End: 2025-08-20 | Stop reason: HOSPADM

## 2025-08-13 RX ORDER — HYDROMORPHONE HYDROCHLORIDE 4 MG/1
4 TABLET ORAL
Status: DISCONTINUED | OUTPATIENT
Start: 2025-08-13 | End: 2025-08-19

## 2025-08-13 RX ORDER — LIDOCAINE 40 MG/G
CREAM TOPICAL
Status: DISCONTINUED | OUTPATIENT
Start: 2025-08-13 | End: 2025-08-13 | Stop reason: HOSPADM

## 2025-08-13 RX ORDER — KETOROLAC TROMETHAMINE 30 MG/ML
15 INJECTION, SOLUTION INTRAMUSCULAR; INTRAVENOUS
Status: DISCONTINUED | OUTPATIENT
Start: 2025-08-13 | End: 2025-08-13 | Stop reason: HOSPADM

## 2025-08-13 RX ORDER — DEXAMETHASONE SODIUM PHOSPHATE 4 MG/ML
INJECTION, SOLUTION INTRA-ARTICULAR; INTRALESIONAL; INTRAMUSCULAR; INTRAVENOUS; SOFT TISSUE PRN
Status: DISCONTINUED | OUTPATIENT
Start: 2025-08-13 | End: 2025-08-13

## 2025-08-13 RX ORDER — CALCIUM CARBONATE 500(1250)
2 TABLET ORAL 2 TIMES DAILY WITH MEALS
COMMUNITY

## 2025-08-13 RX ORDER — VANCOMYCIN HYDROCHLORIDE 1 G/20ML
INJECTION, POWDER, LYOPHILIZED, FOR SOLUTION INTRAVENOUS PRN
Status: DISCONTINUED | OUTPATIENT
Start: 2025-08-13 | End: 2025-08-13 | Stop reason: HOSPADM

## 2025-08-13 RX ORDER — HYDROXYZINE HYDROCHLORIDE 25 MG/1
25 TABLET, FILM COATED ORAL EVERY 6 HOURS PRN
Status: DISCONTINUED | OUTPATIENT
Start: 2025-08-13 | End: 2025-08-20 | Stop reason: HOSPADM

## 2025-08-13 RX ORDER — NALOXONE HYDROCHLORIDE 0.4 MG/ML
0.4 INJECTION, SOLUTION INTRAMUSCULAR; INTRAVENOUS; SUBCUTANEOUS
Status: DISCONTINUED | OUTPATIENT
Start: 2025-08-13 | End: 2025-08-20 | Stop reason: HOSPADM

## 2025-08-13 RX ORDER — BISACODYL 10 MG
10 SUPPOSITORY, RECTAL RECTAL DAILY PRN
Status: DISCONTINUED | OUTPATIENT
Start: 2025-08-13 | End: 2025-08-20 | Stop reason: HOSPADM

## 2025-08-13 RX ORDER — NICOTINE POLACRILEX 4 MG
15-30 LOZENGE BUCCAL
Status: CANCELLED | OUTPATIENT
Start: 2025-08-13

## 2025-08-13 RX ORDER — SODIUM CHLORIDE, SODIUM LACTATE, POTASSIUM CHLORIDE, CALCIUM CHLORIDE 600; 310; 30; 20 MG/100ML; MG/100ML; MG/100ML; MG/100ML
INJECTION, SOLUTION INTRAVENOUS CONTINUOUS
Status: DISCONTINUED | OUTPATIENT
Start: 2025-08-13 | End: 2025-08-15

## 2025-08-13 RX ORDER — POLYETHYLENE GLYCOL 3350 17 G/17G
17 POWDER, FOR SOLUTION ORAL 2 TIMES DAILY
Status: DISCONTINUED | OUTPATIENT
Start: 2025-08-13 | End: 2025-08-20 | Stop reason: HOSPADM

## 2025-08-13 RX ORDER — TOBRAMYCIN 1.2 G/30ML
INJECTION, POWDER, LYOPHILIZED, FOR SOLUTION INTRAVENOUS PRN
Status: DISCONTINUED | OUTPATIENT
Start: 2025-08-13 | End: 2025-08-13 | Stop reason: HOSPADM

## 2025-08-13 RX ORDER — METHOCARBAMOL 750 MG/1
750 TABLET, FILM COATED ORAL EVERY 6 HOURS PRN
Status: DISCONTINUED | OUTPATIENT
Start: 2025-08-13 | End: 2025-08-20 | Stop reason: HOSPADM

## 2025-08-13 RX ORDER — FENTANYL CITRATE 50 UG/ML
25 INJECTION, SOLUTION INTRAMUSCULAR; INTRAVENOUS EVERY 5 MIN PRN
Status: DISCONTINUED | OUTPATIENT
Start: 2025-08-13 | End: 2025-08-13 | Stop reason: HOSPADM

## 2025-08-13 RX ORDER — HYDROMORPHONE HYDROCHLORIDE 2 MG/1
2 TABLET ORAL
Status: DISCONTINUED | OUTPATIENT
Start: 2025-08-13 | End: 2025-08-19

## 2025-08-13 RX ORDER — INDOMETHACIN 25 MG/1
CAPSULE ORAL PRN
Status: DISCONTINUED | OUTPATIENT
Start: 2025-08-13 | End: 2025-08-13

## 2025-08-13 RX ORDER — ALBUMIN HUMAN 5 %
INTRAVENOUS SOLUTION INTRAVENOUS PRN
Status: DISCONTINUED | OUTPATIENT
Start: 2025-08-13 | End: 2025-08-13

## 2025-08-13 RX ORDER — PHENYLEPHRINE HCL IN 0.9% NACL 50MG/250ML
.5-2 PLASTIC BAG, INJECTION (ML) INTRAVENOUS CONTINUOUS
Status: DISCONTINUED | OUTPATIENT
Start: 2025-08-13 | End: 2025-08-13 | Stop reason: HOSPADM

## 2025-08-13 RX ORDER — CALCIUM CHLORIDE 100 MG/ML
INJECTION INTRAVENOUS; INTRAVENTRICULAR PRN
Status: DISCONTINUED | OUTPATIENT
Start: 2025-08-13 | End: 2025-08-13

## 2025-08-13 RX ORDER — HYDROMORPHONE HYDROCHLORIDE 1 MG/ML
0.4 INJECTION, SOLUTION INTRAMUSCULAR; INTRAVENOUS; SUBCUTANEOUS EVERY 5 MIN PRN
Status: DISCONTINUED | OUTPATIENT
Start: 2025-08-13 | End: 2025-08-13 | Stop reason: HOSPADM

## 2025-08-13 RX ORDER — MAGNESIUM OXIDE 400 MG/1
400 TABLET ORAL EVERY 4 HOURS
Status: COMPLETED | OUTPATIENT
Start: 2025-08-13 | End: 2025-08-14

## 2025-08-13 RX ORDER — FENTANYL CITRATE 50 UG/ML
50 INJECTION, SOLUTION INTRAMUSCULAR; INTRAVENOUS EVERY 5 MIN PRN
Status: DISCONTINUED | OUTPATIENT
Start: 2025-08-13 | End: 2025-08-13 | Stop reason: HOSPADM

## 2025-08-13 RX ORDER — PROPOFOL 10 MG/ML
5-75 INJECTION, EMULSION INTRAVENOUS CONTINUOUS
Status: DISCONTINUED | OUTPATIENT
Start: 2025-08-13 | End: 2025-08-13 | Stop reason: HOSPADM

## 2025-08-13 RX ORDER — LIDOCAINE HYDROCHLORIDE ANHYDROUS AND DEXTROSE MONOHYDRATE .8; 5 G/100ML; G/100ML
0.5 INJECTION, SOLUTION INTRAVENOUS CONTINUOUS
Status: DISCONTINUED | OUTPATIENT
Start: 2025-08-13 | End: 2025-08-13 | Stop reason: HOSPADM

## 2025-08-13 RX ORDER — CALCIUM CARBONATE 500(1250)
2 TABLET ORAL 2 TIMES DAILY
Status: DISCONTINUED | OUTPATIENT
Start: 2025-08-14 | End: 2025-08-20 | Stop reason: HOSPADM

## 2025-08-13 RX ORDER — ONDANSETRON 2 MG/ML
INJECTION INTRAMUSCULAR; INTRAVENOUS PRN
Status: DISCONTINUED | OUTPATIENT
Start: 2025-08-13 | End: 2025-08-13

## 2025-08-13 RX ORDER — ACETAMINOPHEN 325 MG/1
975 TABLET ORAL EVERY 8 HOURS
Status: DISCONTINUED | OUTPATIENT
Start: 2025-08-13 | End: 2025-08-20 | Stop reason: HOSPADM

## 2025-08-13 RX ORDER — DEXTROSE MONOHYDRATE 25 G/50ML
25-50 INJECTION, SOLUTION INTRAVENOUS
Status: CANCELLED | OUTPATIENT
Start: 2025-08-13

## 2025-08-13 RX ORDER — MEPERIDINE HYDROCHLORIDE 25 MG/ML
12.5 INJECTION INTRAMUSCULAR; INTRAVENOUS; SUBCUTANEOUS EVERY 5 MIN PRN
Status: DISCONTINUED | OUTPATIENT
Start: 2025-08-13 | End: 2025-08-13 | Stop reason: HOSPADM

## 2025-08-13 RX ORDER — ASCORBIC ACID 250 MG
500 TABLET,CHEWABLE ORAL DAILY
Status: DISCONTINUED | OUTPATIENT
Start: 2025-08-14 | End: 2025-08-20 | Stop reason: HOSPADM

## 2025-08-13 RX ORDER — NICOTINE POLACRILEX 4 MG
15-30 LOZENGE BUCCAL
Status: DISCONTINUED | OUTPATIENT
Start: 2025-08-13 | End: 2025-08-20 | Stop reason: HOSPADM

## 2025-08-13 RX ORDER — LABETALOL HYDROCHLORIDE 5 MG/ML
10 INJECTION, SOLUTION INTRAVENOUS
Status: DISCONTINUED | OUTPATIENT
Start: 2025-08-13 | End: 2025-08-13 | Stop reason: HOSPADM

## 2025-08-13 RX ORDER — LEVALBUTEROL INHALATION SOLUTION 1.25 MG/3ML
1.25 SOLUTION RESPIRATORY (INHALATION) EVERY 6 HOURS PRN
Status: DISCONTINUED | OUTPATIENT
Start: 2025-08-13 | End: 2025-08-20 | Stop reason: HOSPADM

## 2025-08-13 RX ORDER — OXYCODONE HYDROCHLORIDE 5 MG/1
5 TABLET ORAL
Status: DISCONTINUED | OUTPATIENT
Start: 2025-08-13 | End: 2025-08-13 | Stop reason: HOSPADM

## 2025-08-13 RX ORDER — DEXTROSE MONOHYDRATE 25 G/50ML
25-50 INJECTION, SOLUTION INTRAVENOUS
Status: DISCONTINUED | OUTPATIENT
Start: 2025-08-13 | End: 2025-08-20 | Stop reason: HOSPADM

## 2025-08-13 RX ORDER — HYDROMORPHONE HYDROCHLORIDE 1 MG/ML
0.2 INJECTION, SOLUTION INTRAMUSCULAR; INTRAVENOUS; SUBCUTANEOUS EVERY 5 MIN PRN
Status: DISCONTINUED | OUTPATIENT
Start: 2025-08-13 | End: 2025-08-13 | Stop reason: HOSPADM

## 2025-08-13 RX ORDER — ALBUTEROL SULFATE 0.83 MG/ML
2.5 SOLUTION RESPIRATORY (INHALATION) EVERY 4 HOURS PRN
Status: DISCONTINUED | OUTPATIENT
Start: 2025-08-13 | End: 2025-08-13 | Stop reason: HOSPADM

## 2025-08-13 RX ORDER — LIDOCAINE HYDROCHLORIDE 20 MG/ML
INJECTION, SOLUTION INFILTRATION; PERINEURAL PRN
Status: DISCONTINUED | OUTPATIENT
Start: 2025-08-13 | End: 2025-08-13

## 2025-08-13 RX ORDER — DIPHENHYDRAMINE HCL 25 MG
25 CAPSULE ORAL EVERY 6 HOURS PRN
Status: DISCONTINUED | OUTPATIENT
Start: 2025-08-13 | End: 2025-08-13 | Stop reason: HOSPADM

## 2025-08-13 RX ORDER — LIDOCAINE HYDROCHLORIDE ANHYDROUS AND DEXTROSE MONOHYDRATE .8; 5 G/100ML; G/100ML
0.5 INJECTION, SOLUTION INTRAVENOUS CONTINUOUS
Status: DISCONTINUED | OUTPATIENT
Start: 2025-08-13 | End: 2025-08-15

## 2025-08-13 RX ORDER — FAMOTIDINE 20 MG/1
20 TABLET, FILM COATED ORAL 2 TIMES DAILY
Status: DISCONTINUED | OUTPATIENT
Start: 2025-08-13 | End: 2025-08-20 | Stop reason: HOSPADM

## 2025-08-13 RX ORDER — POLYETHYLENE GLYCOL 3350 17 G/17G
17 POWDER, FOR SOLUTION ORAL DAILY
Status: DISCONTINUED | OUTPATIENT
Start: 2025-08-13 | End: 2025-08-13

## 2025-08-13 RX ORDER — CEFAZOLIN SODIUM 2 G/50ML
2 SOLUTION INTRAVENOUS EVERY 8 HOURS
Status: DISCONTINUED | OUTPATIENT
Start: 2025-08-13 | End: 2025-08-13

## 2025-08-13 RX ORDER — SODIUM CHLORIDE, SODIUM GLUCONATE, SODIUM ACETATE, POTASSIUM CHLORIDE AND MAGNESIUM CHLORIDE 526; 502; 368; 37; 30 MG/100ML; MG/100ML; MG/100ML; MG/100ML; MG/100ML
INJECTION, SOLUTION INTRAVENOUS CONTINUOUS PRN
Status: DISCONTINUED | OUTPATIENT
Start: 2025-08-13 | End: 2025-08-13

## 2025-08-13 RX ORDER — PROCHLORPERAZINE MALEATE 10 MG
10 TABLET ORAL EVERY 6 HOURS PRN
Status: DISCONTINUED | OUTPATIENT
Start: 2025-08-13 | End: 2025-08-20 | Stop reason: HOSPADM

## 2025-08-13 RX ORDER — FENTANYL CITRATE 50 UG/ML
25 INJECTION, SOLUTION INTRAMUSCULAR; INTRAVENOUS
Status: DISCONTINUED | OUTPATIENT
Start: 2025-08-13 | End: 2025-08-13 | Stop reason: HOSPADM

## 2025-08-13 RX ADMIN — REMIFENTANIL HYDROCHLORIDE 0.1 MCG/KG/MIN: 1 INJECTION, POWDER, LYOPHILIZED, FOR SOLUTION INTRAVENOUS at 10:17

## 2025-08-13 RX ADMIN — MIDAZOLAM 2 MG: 1 INJECTION INTRAMUSCULAR; INTRAVENOUS at 07:39

## 2025-08-13 RX ADMIN — Medication 0.5 MCG/KG/MIN: at 08:02

## 2025-08-13 RX ADMIN — PHENYLEPHRINE HYDROCHLORIDE 100 MCG: 10 INJECTION INTRAVENOUS at 15:18

## 2025-08-13 RX ADMIN — HYDROMORPHONE HYDROCHLORIDE 0.4 MG: 1 INJECTION, SOLUTION INTRAMUSCULAR; INTRAVENOUS; SUBCUTANEOUS at 19:00

## 2025-08-13 RX ADMIN — CALCIUM CHLORIDE INJECTION 200 MG: 100 INJECTION, SOLUTION INTRAVENOUS at 14:56

## 2025-08-13 RX ADMIN — PHENYLEPHRINE HYDROCHLORIDE 200 MCG: 10 INJECTION INTRAVENOUS at 15:07

## 2025-08-13 RX ADMIN — PHENYLEPHRINE HYDROCHLORIDE 200 MCG: 10 INJECTION INTRAVENOUS at 13:54

## 2025-08-13 RX ADMIN — HYDROMORPHONE HYDROCHLORIDE 0.5 MG: 1 INJECTION, SOLUTION INTRAMUSCULAR; INTRAVENOUS; SUBCUTANEOUS at 16:55

## 2025-08-13 RX ADMIN — PHENYLEPHRINE HYDROCHLORIDE 100 MCG: 10 INJECTION INTRAVENOUS at 08:06

## 2025-08-13 RX ADMIN — SODIUM CHLORIDE, SODIUM LACTATE, POTASSIUM CHLORIDE, AND CALCIUM CHLORIDE 500 ML: .6; .31; .03; .02 INJECTION, SOLUTION INTRAVENOUS at 23:22

## 2025-08-13 RX ADMIN — SODIUM CHLORIDE, SODIUM GLUCONATE, SODIUM ACETATE, POTASSIUM CHLORIDE AND MAGNESIUM CHLORIDE: 526; 502; 368; 37; 30 INJECTION, SOLUTION INTRAVENOUS at 12:56

## 2025-08-13 RX ADMIN — LIDOCAINE HYDROCHLORIDE 80 MG: 20 INJECTION, SOLUTION INFILTRATION; PERINEURAL at 07:50

## 2025-08-13 RX ADMIN — PROPOFOL 20 MG: 10 INJECTION, EMULSION INTRAVENOUS at 08:31

## 2025-08-13 RX ADMIN — CALCIUM CHLORIDE INJECTION 100 MG: 100 INJECTION, SOLUTION INTRAVENOUS at 14:39

## 2025-08-13 RX ADMIN — EPHEDRINE SULFATE 5 MG: 5 INJECTION INTRAVENOUS at 12:24

## 2025-08-13 RX ADMIN — CALCIUM CHLORIDE INJECTION 500 MG: 100 INJECTION, SOLUTION INTRAVENOUS at 15:44

## 2025-08-13 RX ADMIN — Medication 50 MG: at 11:50

## 2025-08-13 RX ADMIN — CALCIUM CHLORIDE INJECTION 200 MG: 100 INJECTION, SOLUTION INTRAVENOUS at 14:49

## 2025-08-13 RX ADMIN — PHENYLEPHRINE HYDROCHLORIDE 200 MCG: 10 INJECTION INTRAVENOUS at 12:44

## 2025-08-13 RX ADMIN — HYDROMORPHONE HYDROCHLORIDE 0.5 MG: 1 INJECTION, SOLUTION INTRAMUSCULAR; INTRAVENOUS; SUBCUTANEOUS at 17:04

## 2025-08-13 RX ADMIN — ONDANSETRON 4 MG: 2 INJECTION INTRAMUSCULAR; INTRAVENOUS at 17:50

## 2025-08-13 RX ADMIN — PHENYLEPHRINE HYDROCHLORIDE 200 MCG: 10 INJECTION INTRAVENOUS at 10:25

## 2025-08-13 RX ADMIN — SODIUM CHLORIDE, SODIUM GLUCONATE, SODIUM ACETATE, POTASSIUM CHLORIDE AND MAGNESIUM CHLORIDE: 526; 502; 368; 37; 30 INJECTION, SOLUTION INTRAVENOUS at 13:25

## 2025-08-13 RX ADMIN — PHENYLEPHRINE HYDROCHLORIDE 100 MCG: 10 INJECTION INTRAVENOUS at 15:59

## 2025-08-13 RX ADMIN — PHENYLEPHRINE HYDROCHLORIDE 100 MCG: 10 INJECTION INTRAVENOUS at 14:02

## 2025-08-13 RX ADMIN — PHENYLEPHRINE HYDROCHLORIDE 200 MCG: 10 INJECTION INTRAVENOUS at 14:23

## 2025-08-13 RX ADMIN — PHENYLEPHRINE HYDROCHLORIDE 100 MCG: 10 INJECTION INTRAVENOUS at 12:56

## 2025-08-13 RX ADMIN — FLUOXETINE HYDROCHLORIDE 20 MG: 20 CAPSULE ORAL at 20:50

## 2025-08-13 RX ADMIN — CALCIUM CHLORIDE INJECTION 20 MG: 100 INJECTION, SOLUTION INTRAVENOUS at 14:59

## 2025-08-13 RX ADMIN — SODIUM CHLORIDE, SODIUM LACTATE, POTASSIUM CHLORIDE, AND CALCIUM CHLORIDE: .6; .31; .03; .02 INJECTION, SOLUTION INTRAVENOUS at 07:15

## 2025-08-13 RX ADMIN — CALCIUM CHLORIDE INJECTION 500 MG: 100 INJECTION, SOLUTION INTRAVENOUS at 15:29

## 2025-08-13 RX ADMIN — SODIUM CHLORIDE, SODIUM LACTATE, POTASSIUM CHLORIDE, AND CALCIUM CHLORIDE: .6; .31; .03; .02 INJECTION, SOLUTION INTRAVENOUS at 07:39

## 2025-08-13 RX ADMIN — FAMOTIDINE 20 MG: 20 INJECTION, SOLUTION INTRAVENOUS at 20:35

## 2025-08-13 RX ADMIN — GABAPENTIN 300 MG: 300 CAPSULE ORAL at 06:27

## 2025-08-13 RX ADMIN — REMIFENTANIL HYDROCHLORIDE 0.1 MCG/KG/MIN: 1 INJECTION, POWDER, LYOPHILIZED, FOR SOLUTION INTRAVENOUS at 14:01

## 2025-08-13 RX ADMIN — ALBUMIN (HUMAN) 250 ML: 12.5 SOLUTION INTRAVENOUS at 14:37

## 2025-08-13 RX ADMIN — Medication 2 G: at 16:40

## 2025-08-13 RX ADMIN — SODIUM CHLORIDE, SODIUM GLUCONATE, SODIUM ACETATE, POTASSIUM CHLORIDE AND MAGNESIUM CHLORIDE: 526; 502; 368; 37; 30 INJECTION, SOLUTION INTRAVENOUS at 14:34

## 2025-08-13 RX ADMIN — PHENYLEPHRINE HYDROCHLORIDE 200 MCG: 10 INJECTION INTRAVENOUS at 10:51

## 2025-08-13 RX ADMIN — PROPOFOL 20 MG: 10 INJECTION, EMULSION INTRAVENOUS at 12:49

## 2025-08-13 RX ADMIN — Medication 2 G: at 08:40

## 2025-08-13 RX ADMIN — CALCIUM CHLORIDE INJECTION 200 MG: 100 INJECTION, SOLUTION INTRAVENOUS at 14:40

## 2025-08-13 RX ADMIN — TRANEXAMIC ACID 10 MG/KG/HR: 100 INJECTION INTRAVENOUS at 13:57

## 2025-08-13 RX ADMIN — Medication 2 G: at 12:40

## 2025-08-13 RX ADMIN — SODIUM CHLORIDE 10 MG/HR: 9 INJECTION, SOLUTION INTRAVENOUS at 08:43

## 2025-08-13 RX ADMIN — POLYETHYLENE GLYCOL 3350 17 G: 17 POWDER, FOR SOLUTION ORAL at 21:13

## 2025-08-13 RX ADMIN — CEFAZOLIN SODIUM 2 G: 2 SOLUTION INTRAVENOUS at 22:38

## 2025-08-13 RX ADMIN — Medication 100 MG: at 18:00

## 2025-08-13 RX ADMIN — PHENYLEPHRINE HYDROCHLORIDE 100 MCG: 10 INJECTION INTRAVENOUS at 08:02

## 2025-08-13 RX ADMIN — PHENYLEPHRINE HYDROCHLORIDE 100 MCG: 10 INJECTION INTRAVENOUS at 08:05

## 2025-08-13 RX ADMIN — EPHEDRINE SULFATE 5 MG: 5 INJECTION INTRAVENOUS at 14:40

## 2025-08-13 RX ADMIN — EPHEDRINE SULFATE 5 MG: 5 INJECTION INTRAVENOUS at 13:44

## 2025-08-13 RX ADMIN — DEXMEDETOMIDINE HYDROCHLORIDE 10 MCG: 100 INJECTION, SOLUTION INTRAVENOUS at 10:14

## 2025-08-13 RX ADMIN — ALBUMIN (HUMAN) 250 ML: 12.5 SOLUTION INTRAVENOUS at 13:00

## 2025-08-13 RX ADMIN — PHENYLEPHRINE HYDROCHLORIDE 100 MCG: 10 INJECTION INTRAVENOUS at 14:00

## 2025-08-13 RX ADMIN — EPHEDRINE SULFATE 5 MG: 5 INJECTION INTRAVENOUS at 13:47

## 2025-08-13 RX ADMIN — PHENYLEPHRINE HYDROCHLORIDE 100 MCG: 10 INJECTION INTRAVENOUS at 12:02

## 2025-08-13 RX ADMIN — FENTANYL CITRATE 100 MCG: 50 INJECTION INTRAMUSCULAR; INTRAVENOUS at 07:50

## 2025-08-13 RX ADMIN — PHENYLEPHRINE HYDROCHLORIDE 100 MCG: 10 INJECTION INTRAVENOUS at 13:05

## 2025-08-13 RX ADMIN — SODIUM CHLORIDE, SODIUM LACTATE, POTASSIUM CHLORIDE, AND CALCIUM CHLORIDE: .6; .31; .03; .02 INJECTION, SOLUTION INTRAVENOUS at 12:56

## 2025-08-13 RX ADMIN — PHENYLEPHRINE HYDROCHLORIDE 100 MCG: 10 INJECTION INTRAVENOUS at 13:40

## 2025-08-13 RX ADMIN — PHENYLEPHRINE HYDROCHLORIDE 100 MCG: 10 INJECTION INTRAVENOUS at 08:08

## 2025-08-13 RX ADMIN — THIAMINE HYDROCHLORIDE 100 MG: 100 INJECTION, SOLUTION INTRAMUSCULAR; INTRAVENOUS at 23:05

## 2025-08-13 RX ADMIN — MAGNESIUM OXIDE TAB 400 MG (241.3 MG ELEMENTAL MG) 400 MG: 400 (241.3 MG) TAB at 23:04

## 2025-08-13 RX ADMIN — PHENYLEPHRINE HYDROCHLORIDE 100 MCG: 10 INJECTION INTRAVENOUS at 11:55

## 2025-08-13 RX ADMIN — TRANEXAMIC ACID 2667 MG: 100 INJECTION INTRAVENOUS at 08:10

## 2025-08-13 RX ADMIN — FENTANYL CITRATE 50 MCG: 50 INJECTION, SOLUTION INTRAMUSCULAR; INTRAVENOUS at 18:42

## 2025-08-13 RX ADMIN — PHENYLEPHRINE HYDROCHLORIDE 100 MCG: 10 INJECTION INTRAVENOUS at 12:48

## 2025-08-13 RX ADMIN — SODIUM CHLORIDE, SODIUM LACTATE, POTASSIUM CHLORIDE, AND CALCIUM CHLORIDE: .6; .31; .03; .02 INJECTION, SOLUTION INTRAVENOUS at 11:29

## 2025-08-13 RX ADMIN — PHENYLEPHRINE HYDROCHLORIDE 200 MCG: 10 INJECTION INTRAVENOUS at 13:09

## 2025-08-13 RX ADMIN — PROPOFOL 150 MCG/KG/MIN: 10 INJECTION, EMULSION INTRAVENOUS at 07:54

## 2025-08-13 RX ADMIN — DEXMEDETOMIDINE HYDROCHLORIDE 4 MCG: 100 INJECTION, SOLUTION INTRAVENOUS at 12:44

## 2025-08-13 RX ADMIN — EPHEDRINE SULFATE 5 MG: 5 INJECTION INTRAVENOUS at 12:29

## 2025-08-13 RX ADMIN — PHENYLEPHRINE HYDROCHLORIDE 100 MCG: 10 INJECTION INTRAVENOUS at 08:04

## 2025-08-13 RX ADMIN — PHENYLEPHRINE HYDROCHLORIDE 100 MCG: 10 INJECTION INTRAVENOUS at 11:59

## 2025-08-13 RX ADMIN — ACETAMINOPHEN 975 MG: 325 TABLET ORAL at 06:28

## 2025-08-13 RX ADMIN — PHENYLEPHRINE HYDROCHLORIDE 200 MCG: 10 INJECTION INTRAVENOUS at 11:25

## 2025-08-13 RX ADMIN — Medication 15 MG: at 07:50

## 2025-08-13 RX ADMIN — PHENYLEPHRINE HYDROCHLORIDE 100 MCG: 10 INJECTION INTRAVENOUS at 15:27

## 2025-08-13 RX ADMIN — ALBUMIN (HUMAN) 250 ML: 12.5 SOLUTION INTRAVENOUS at 10:51

## 2025-08-13 RX ADMIN — LIDOCAINE HYDROCHLORIDE 0.5 MG/KG/HR: 8 INJECTION, SOLUTION INTRAVENOUS at 08:10

## 2025-08-13 RX ADMIN — SODIUM CHLORIDE, SODIUM GLUCONATE, SODIUM ACETATE, POTASSIUM CHLORIDE AND MAGNESIUM CHLORIDE: 526; 502; 368; 37; 30 INJECTION, SOLUTION INTRAVENOUS at 13:47

## 2025-08-13 RX ADMIN — PHENYLEPHRINE HYDROCHLORIDE 100 MCG: 10 INJECTION INTRAVENOUS at 13:10

## 2025-08-13 RX ADMIN — HYDROMORPHONE HYDROCHLORIDE 0.3 MG: 1 INJECTION, SOLUTION INTRAMUSCULAR; INTRAVENOUS; SUBCUTANEOUS at 20:29

## 2025-08-13 RX ADMIN — PHENYLEPHRINE HYDROCHLORIDE 100 MCG: 10 INJECTION INTRAVENOUS at 13:07

## 2025-08-13 RX ADMIN — EPHEDRINE SULFATE 5 MG: 5 INJECTION INTRAVENOUS at 12:36

## 2025-08-13 RX ADMIN — PROPOFOL 200 MG: 10 INJECTION, EMULSION INTRAVENOUS at 07:50

## 2025-08-13 RX ADMIN — PHENYLEPHRINE HYDROCHLORIDE 100 MCG: 10 INJECTION INTRAVENOUS at 15:01

## 2025-08-13 RX ADMIN — PHENYLEPHRINE HYDROCHLORIDE 200 MCG: 10 INJECTION INTRAVENOUS at 14:42

## 2025-08-13 RX ADMIN — KETAMINE HYDROCHLORIDE 5 MG/HR: 10 INJECTION INTRAMUSCULAR; INTRAVENOUS at 21:26

## 2025-08-13 RX ADMIN — EPHEDRINE SULFATE 10 MG: 5 INJECTION INTRAVENOUS at 12:16

## 2025-08-13 RX ADMIN — PHENYLEPHRINE HYDROCHLORIDE 200 MCG: 10 INJECTION INTRAVENOUS at 14:43

## 2025-08-13 RX ADMIN — DEXMEDETOMIDINE HYDROCHLORIDE 6 MCG: 100 INJECTION, SOLUTION INTRAVENOUS at 10:54

## 2025-08-13 RX ADMIN — DEXAMETHASONE SODIUM PHOSPHATE 8 MG: 4 INJECTION, SOLUTION INTRAMUSCULAR; INTRAVENOUS at 07:50

## 2025-08-13 RX ADMIN — EPHEDRINE SULFATE 5 MG: 5 INJECTION INTRAVENOUS at 14:41

## 2025-08-13 RX ADMIN — EPHEDRINE SULFATE 5 MG: 5 INJECTION INTRAVENOUS at 14:42

## 2025-08-13 RX ADMIN — SODIUM CHLORIDE, SODIUM GLUCONATE, SODIUM ACETATE, POTASSIUM CHLORIDE AND MAGNESIUM CHLORIDE: 526; 502; 368; 37; 30 INJECTION, SOLUTION INTRAVENOUS at 15:01

## 2025-08-13 RX ADMIN — CALCIUM CHLORIDE INJECTION 100 MG: 100 INJECTION, SOLUTION INTRAVENOUS at 14:38

## 2025-08-13 RX ADMIN — PHENYLEPHRINE HYDROCHLORIDE 100 MCG: 10 INJECTION INTRAVENOUS at 14:03

## 2025-08-13 RX ADMIN — FENTANYL CITRATE 50 MCG: 50 INJECTION, SOLUTION INTRAMUSCULAR; INTRAVENOUS at 18:31

## 2025-08-13 RX ADMIN — PHENYLEPHRINE HYDROCHLORIDE 200 MCG: 10 INJECTION INTRAVENOUS at 14:41

## 2025-08-13 RX ADMIN — Medication: at 21:18

## 2025-08-13 RX ADMIN — SENNOSIDES AND DOCUSATE SODIUM 2 TABLET: 50; 8.6 TABLET ORAL at 20:50

## 2025-08-13 RX ADMIN — TRANEXAMIC ACID 10 MG/KG/HR: 100 INJECTION INTRAVENOUS at 08:35

## 2025-08-13 RX ADMIN — PHENYLEPHRINE HYDROCHLORIDE 200 MCG: 10 INJECTION INTRAVENOUS at 13:02

## 2025-08-13 RX ADMIN — REMIFENTANIL HYDROCHLORIDE 0.1 MCG/KG/MIN: 1 INJECTION, POWDER, LYOPHILIZED, FOR SOLUTION INTRAVENOUS at 07:54

## 2025-08-13 RX ADMIN — Medication 20 MG: at 10:16

## 2025-08-13 RX ADMIN — ACETAMINOPHEN 975 MG: 325 TABLET ORAL at 20:50

## 2025-08-13 RX ADMIN — SODIUM BICARBONATE 50 MEQ: 84 INJECTION, SOLUTION INTRAVENOUS at 16:23

## 2025-08-13 RX ADMIN — Medication 35 MG: at 09:10

## 2025-08-13 ASSESSMENT — ACTIVITIES OF DAILY LIVING (ADL)
ADLS_ACUITY_SCORE: 32
ADLS_ACUITY_SCORE: 32
ADLS_ACUITY_SCORE: 27
ADLS_ACUITY_SCORE: 32
ADLS_ACUITY_SCORE: 34
ADLS_ACUITY_SCORE: 34

## 2025-08-14 ENCOUNTER — APPOINTMENT (OUTPATIENT)
Dept: CARDIOLOGY | Facility: CLINIC | Age: 20
End: 2025-08-14
Payer: COMMERCIAL

## 2025-08-14 ENCOUNTER — APPOINTMENT (OUTPATIENT)
Dept: GENERAL RADIOLOGY | Facility: CLINIC | Age: 20
End: 2025-08-14
Payer: COMMERCIAL

## 2025-08-14 VITALS
HEIGHT: 69 IN | OXYGEN SATURATION: 99 % | HEART RATE: 96 BPM | WEIGHT: 202 LBS | SYSTOLIC BLOOD PRESSURE: 98 MMHG | DIASTOLIC BLOOD PRESSURE: 46 MMHG | BODY MASS INDEX: 29.92 KG/M2 | TEMPERATURE: 98 F | RESPIRATION RATE: 10 BRPM

## 2025-08-14 LAB
ALBUMIN SERPL BCG-MCNC: 2.6 G/DL (ref 3.5–5.2)
ALLEN'S TEST: ABNORMAL
ALP SERPL-CCNC: 39 U/L (ref 40–150)
ALT SERPL W P-5'-P-CCNC: 21 U/L (ref 0–70)
ANION GAP SERPL CALCULATED.3IONS-SCNC: 6 MMOL/L (ref 7–15)
APTT PPP: 30 SECONDS (ref 22–38)
AST SERPL W P-5'-P-CCNC: 49 U/L (ref 0–45)
ATRIAL RATE - MUSE: 94 BPM
ATRIAL RATE - MUSE: 99 BPM
BASE EXCESS BLDA CALC-SCNC: 2 MMOL/L (ref -3–3)
BILIRUB DIRECT SERPL-MCNC: 0.46 MG/DL (ref 0–0.3)
BILIRUB SERPL-MCNC: 1.4 MG/DL
BUN SERPL-MCNC: 10.8 MG/DL (ref 6–20)
CALCIUM SERPL-MCNC: 7.5 MG/DL (ref 8.8–10.4)
CHLORIDE SERPL-SCNC: 106 MMOL/L (ref 98–107)
CREAT SERPL-MCNC: 0.24 MG/DL (ref 0.67–1.17)
CYSTATIN C (ROCHE): 0.8 MG/L (ref 0.6–1)
DIASTOLIC BLOOD PRESSURE - MUSE: NORMAL MMHG
DIASTOLIC BLOOD PRESSURE - MUSE: NORMAL MMHG
EGFRCR SERPLBLD CKD-EPI 2021: >90 ML/MIN/1.73M2
ERYTHROCYTE [DISTWIDTH] IN BLOOD BY AUTOMATED COUNT: 14.3 % (ref 10–15)
FIBRINOGEN PPP-MCNC: 247 MG/DL (ref 170–510)
GFR/BSA.PRED SERPLBLD CYS-BASED-ARV: >90 ML/MIN/1.73M2
GLUCOSE BLDC GLUCOMTR-MCNC: 120 MG/DL (ref 70–99)
GLUCOSE BLDC GLUCOMTR-MCNC: 131 MG/DL (ref 70–99)
GLUCOSE BLDC GLUCOMTR-MCNC: 131 MG/DL (ref 70–99)
GLUCOSE BLDC GLUCOMTR-MCNC: 144 MG/DL (ref 70–99)
GLUCOSE SERPL-MCNC: 127 MG/DL (ref 70–99)
HCO3 BLD-SCNC: 28 MMOL/L (ref 21–28)
HCO3 SERPL-SCNC: 25 MMOL/L (ref 22–29)
HCT VFR BLD AUTO: 28.3 % (ref 40–53)
HGB BLD-MCNC: 10 G/DL (ref 13.3–17.7)
INR PPP: 1.25 (ref 0.85–1.15)
INTERPRETATION ECG - MUSE: NORMAL
INTERPRETATION ECG - MUSE: NORMAL
LACTATE SERPL-SCNC: 1.3 MMOL/L (ref 0.7–2)
LACTATE SERPL-SCNC: 3.2 MMOL/L (ref 0.7–2)
LVEF ECHO: NORMAL
MAGNESIUM SERPL-MCNC: 1.7 MG/DL (ref 1.7–2.3)
MAGNESIUM SERPL-MCNC: 1.8 MG/DL (ref 1.7–2.3)
MCH RBC QN AUTO: 29.8 PG (ref 26.5–33)
MCHC RBC AUTO-ENTMCNC: 35.3 G/DL (ref 31.5–36.5)
MCV RBC AUTO: 84.2 FL (ref 78–100)
O2/TOTAL GAS SETTING VFR VENT: 30 %
OXYHGB MFR BLDA: 97 % (ref 92–100)
P AXIS - MUSE: 28 DEGREES
P AXIS - MUSE: 50 DEGREES
PCO2 BLD: 48 MM HG (ref 35–45)
PH BLD: 7.37 [PH] (ref 7.35–7.45)
PHOSPHATE SERPL-MCNC: 3.6 MG/DL (ref 2.5–4.5)
PLATELET # BLD AUTO: 125 10E3/UL (ref 150–450)
PO2 BLD: 142 MM HG (ref 80–105)
POTASSIUM SERPL-SCNC: 4.2 MMOL/L (ref 3.4–5.3)
PR INTERVAL - MUSE: 126 MS
PR INTERVAL - MUSE: 138 MS
PROT SERPL-MCNC: 4 G/DL (ref 6.4–8.3)
PROTHROMBIN TIME: 16.1 SECONDS (ref 11.8–14.8)
QRS DURATION - MUSE: 76 MS
QRS DURATION - MUSE: 90 MS
QT - MUSE: 338 MS
QT - MUSE: 362 MS
QTC - MUSE: 422 MS
QTC - MUSE: 464 MS
R AXIS - MUSE: -3 DEGREES
R AXIS - MUSE: 3 DEGREES
RBC # BLD AUTO: 3.36 10E6/UL (ref 4.4–5.9)
SAO2 % BLDA: 99.7 % (ref 96–97)
SODIUM SERPL-SCNC: 137 MMOL/L (ref 135–145)
SYSTOLIC BLOOD PRESSURE - MUSE: NORMAL MMHG
SYSTOLIC BLOOD PRESSURE - MUSE: NORMAL MMHG
T AXIS - MUSE: 30 DEGREES
T AXIS - MUSE: 7 DEGREES
VENTRICULAR RATE- MUSE: 94 BPM
VENTRICULAR RATE- MUSE: 99 BPM
WBC # BLD AUTO: 14.03 10E3/UL (ref 4–11)

## 2025-08-14 PROCEDURE — 82374 ASSAY BLOOD CARBON DIOXIDE: CPT | Performed by: NURSE PRACTITIONER

## 2025-08-14 PROCEDURE — 250N000009 HC RX 250: Performed by: NURSE PRACTITIONER

## 2025-08-14 PROCEDURE — 84155 ASSAY OF PROTEIN SERUM: CPT | Performed by: NURSE PRACTITIONER

## 2025-08-14 PROCEDURE — 999N000157 HC STATISTIC RCP TIME EA 10 MIN

## 2025-08-14 PROCEDURE — 85610 PROTHROMBIN TIME: CPT | Performed by: NURSE PRACTITIONER

## 2025-08-14 PROCEDURE — 99222 1ST HOSP IP/OBS MODERATE 55: CPT | Performed by: PHYSICIAN ASSISTANT

## 2025-08-14 PROCEDURE — 258N000003 HC RX IP 258 OP 636: Performed by: NURSE PRACTITIONER

## 2025-08-14 PROCEDURE — 250N000011 HC RX IP 250 OP 636: Performed by: ORTHOPAEDIC SURGERY

## 2025-08-14 PROCEDURE — 85384 FIBRINOGEN ACTIVITY: CPT | Performed by: NURSE PRACTITIONER

## 2025-08-14 PROCEDURE — 258N000003 HC RX IP 258 OP 636: Performed by: SPECIALIST/TECHNOLOGIST

## 2025-08-14 PROCEDURE — 85730 THROMBOPLASTIN TIME PARTIAL: CPT | Performed by: NURSE PRACTITIONER

## 2025-08-14 PROCEDURE — 200N000002 HC R&B ICU UMMC

## 2025-08-14 PROCEDURE — 93321 DOPPLER ECHO F-UP/LMTD STD: CPT | Mod: 26 | Performed by: INTERNAL MEDICINE

## 2025-08-14 PROCEDURE — 84100 ASSAY OF PHOSPHORUS: CPT | Performed by: NURSE PRACTITIONER

## 2025-08-14 PROCEDURE — P9045 ALBUMIN (HUMAN), 5%, 250 ML: HCPCS | Mod: JZ | Performed by: NURSE PRACTITIONER

## 2025-08-14 PROCEDURE — 250N000013 HC RX MED GY IP 250 OP 250 PS 637: Performed by: NURSE PRACTITIONER

## 2025-08-14 PROCEDURE — 250N000013 HC RX MED GY IP 250 OP 250 PS 637: Performed by: ORTHOPAEDIC SURGERY

## 2025-08-14 PROCEDURE — 250N000009 HC RX 250: Performed by: PHYSICIAN ASSISTANT

## 2025-08-14 PROCEDURE — 250N000009 HC RX 250: Performed by: SPECIALIST/TECHNOLOGIST

## 2025-08-14 PROCEDURE — 250N000011 HC RX IP 250 OP 636: Mod: JZ | Performed by: PHYSICIAN ASSISTANT

## 2025-08-14 PROCEDURE — 83735 ASSAY OF MAGNESIUM: CPT | Performed by: NURSE PRACTITIONER

## 2025-08-14 PROCEDURE — 94640 AIRWAY INHALATION TREATMENT: CPT

## 2025-08-14 PROCEDURE — 258N000003 HC RX IP 258 OP 636: Performed by: PHYSICIAN ASSISTANT

## 2025-08-14 PROCEDURE — 85027 COMPLETE CBC AUTOMATED: CPT | Performed by: NURSE PRACTITIONER

## 2025-08-14 PROCEDURE — 83605 ASSAY OF LACTIC ACID: CPT | Performed by: NURSE PRACTITIONER

## 2025-08-14 PROCEDURE — 93308 TTE F-UP OR LMTD: CPT | Mod: 26 | Performed by: INTERNAL MEDICINE

## 2025-08-14 PROCEDURE — 94660 CPAP INITIATION&MGMT: CPT

## 2025-08-14 PROCEDURE — 93325 DOPPLER ECHO COLOR FLOW MAPG: CPT | Mod: 26 | Performed by: INTERNAL MEDICINE

## 2025-08-14 PROCEDURE — 93005 ELECTROCARDIOGRAM TRACING: CPT

## 2025-08-14 PROCEDURE — 999N000111 HC STATISTIC OT IP EVAL DEFER

## 2025-08-14 PROCEDURE — 250N000013 HC RX MED GY IP 250 OP 250 PS 637: Performed by: PHYSICIAN ASSISTANT

## 2025-08-14 PROCEDURE — 999N000147 HC STATISTIC PT IP EVAL DEFER: Performed by: PHYSICAL THERAPIST

## 2025-08-14 PROCEDURE — 71045 X-RAY EXAM CHEST 1 VIEW: CPT | Mod: 26 | Performed by: RADIOLOGY

## 2025-08-14 PROCEDURE — 93325 DOPPLER ECHO COLOR FLOW MAPG: CPT

## 2025-08-14 PROCEDURE — 82805 BLOOD GASES W/O2 SATURATION: CPT | Performed by: NURSE PRACTITIONER

## 2025-08-14 PROCEDURE — 250N000011 HC RX IP 250 OP 636: Mod: JZ | Performed by: NURSE PRACTITIONER

## 2025-08-14 PROCEDURE — 99232 SBSQ HOSP IP/OBS MODERATE 35: CPT | Mod: 24 | Performed by: PHYSICIAN ASSISTANT

## 2025-08-14 PROCEDURE — 71045 X-RAY EXAM CHEST 1 VIEW: CPT

## 2025-08-14 RX ORDER — MAGNESIUM OXIDE 400 MG/1
400 TABLET ORAL EVERY 4 HOURS
Status: COMPLETED | OUTPATIENT
Start: 2025-08-14 | End: 2025-08-14

## 2025-08-14 RX ORDER — HYDROCORTISONE SODIUM SUCCINATE 100 MG/2ML
50 INJECTION INTRAMUSCULAR; INTRAVENOUS EVERY 6 HOURS
Status: COMPLETED | OUTPATIENT
Start: 2025-08-14 | End: 2025-08-15

## 2025-08-14 RX ORDER — PHENYLEPHRINE HCL IN 0.9% NACL 50MG/250ML
.5-2 PLASTIC BAG, INJECTION (ML) INTRAVENOUS CONTINUOUS
Status: DISCONTINUED | OUTPATIENT
Start: 2025-08-14 | End: 2025-08-15

## 2025-08-14 RX ORDER — PREDNISONE 50 MG/1
100 TABLET ORAL
Status: DISCONTINUED | OUTPATIENT
Start: 2025-08-16 | End: 2025-08-20 | Stop reason: HOSPADM

## 2025-08-14 RX ADMIN — SODIUM CHLORIDE, SODIUM LACTATE, POTASSIUM CHLORIDE, AND CALCIUM CHLORIDE 500 ML: .6; .31; .03; .02 INJECTION, SOLUTION INTRAVENOUS at 23:14

## 2025-08-14 RX ADMIN — METOPROLOL TARTRATE 5 MG: 5 INJECTION, SOLUTION INTRAVENOUS at 07:53

## 2025-08-14 RX ADMIN — FAMOTIDINE 20 MG: 20 TABLET, FILM COATED ORAL at 20:07

## 2025-08-14 RX ADMIN — SODIUM CHLORIDE, SODIUM LACTATE, POTASSIUM CHLORIDE, AND CALCIUM CHLORIDE 500 ML: .6; .31; .03; .02 INJECTION, SOLUTION INTRAVENOUS at 03:41

## 2025-08-14 RX ADMIN — FAMOTIDINE 20 MG: 20 TABLET, FILM COATED ORAL at 07:53

## 2025-08-14 RX ADMIN — FLUOXETINE HYDROCHLORIDE 20 MG: 20 CAPSULE ORAL at 07:52

## 2025-08-14 RX ADMIN — MAGNESIUM OXIDE TAB 400 MG (241.3 MG ELEMENTAL MG) 400 MG: 400 (241.3 MG) TAB at 10:06

## 2025-08-14 RX ADMIN — ACETAMINOPHEN 975 MG: 325 TABLET ORAL at 12:16

## 2025-08-14 RX ADMIN — SENNOSIDES AND DOCUSATE SODIUM 2 TABLET: 50; 8.6 TABLET ORAL at 20:07

## 2025-08-14 RX ADMIN — LIDOCAINE HYDROCHLORIDE 0.5 MG/KG/HR: 8 INJECTION, SOLUTION INTRAVENOUS at 22:38

## 2025-08-14 RX ADMIN — Medication 500 MG: at 07:52

## 2025-08-14 RX ADMIN — SENNOSIDES AND DOCUSATE SODIUM 2 TABLET: 50; 8.6 TABLET ORAL at 07:53

## 2025-08-14 RX ADMIN — Medication 1000 MG: at 07:53

## 2025-08-14 RX ADMIN — ALBUMIN HUMAN 12.5 G: 0.05 INJECTION, SOLUTION INTRAVENOUS at 04:59

## 2025-08-14 RX ADMIN — ALBUMIN HUMAN 12.5 G: 0.05 INJECTION, SOLUTION INTRAVENOUS at 22:13

## 2025-08-14 RX ADMIN — POLYETHYLENE GLYCOL 3350 17 G: 17 POWDER, FOR SOLUTION ORAL at 20:07

## 2025-08-14 RX ADMIN — SODIUM CHLORIDE, SODIUM LACTATE, POTASSIUM CHLORIDE, AND CALCIUM CHLORIDE: .6; .31; .03; .02 INJECTION, SOLUTION INTRAVENOUS at 00:21

## 2025-08-14 RX ADMIN — SODIUM CHLORIDE, SODIUM LACTATE, POTASSIUM CHLORIDE, AND CALCIUM CHLORIDE: .6; .31; .03; .02 INJECTION, SOLUTION INTRAVENOUS at 06:13

## 2025-08-14 RX ADMIN — Medication 0.5 MCG/KG/MIN: at 23:55

## 2025-08-14 RX ADMIN — METOPROLOL TARTRATE 5 MG: 5 INJECTION, SOLUTION INTRAVENOUS at 14:58

## 2025-08-14 RX ADMIN — KETAMINE HYDROCHLORIDE 5 MG/HR: 10 INJECTION INTRAMUSCULAR; INTRAVENOUS at 17:07

## 2025-08-14 RX ADMIN — Medication 125 MCG: at 07:53

## 2025-08-14 RX ADMIN — CEFAZOLIN SODIUM 2 G: 2 SOLUTION INTRAVENOUS at 08:42

## 2025-08-14 RX ADMIN — HYDROCORTISONE SODIUM SUCCINATE 50 MG: 100 INJECTION, POWDER, FOR SOLUTION INTRAMUSCULAR; INTRAVENOUS at 23:12

## 2025-08-14 RX ADMIN — MAGNESIUM OXIDE TAB 400 MG (241.3 MG ELEMENTAL MG) 400 MG: 400 (241.3 MG) TAB at 06:06

## 2025-08-14 RX ADMIN — Medication 1000 MG: at 20:06

## 2025-08-14 RX ADMIN — MAGNESIUM OXIDE TAB 400 MG (241.3 MG ELEMENTAL MG) 400 MG: 400 (241.3 MG) TAB at 03:06

## 2025-08-14 RX ADMIN — POLYETHYLENE GLYCOL 3350 17 G: 17 POWDER, FOR SOLUTION ORAL at 07:53

## 2025-08-14 RX ADMIN — ACETAMINOPHEN 975 MG: 325 TABLET ORAL at 20:07

## 2025-08-14 RX ADMIN — CEFAZOLIN SODIUM 2 G: 2 SOLUTION INTRAVENOUS at 17:19

## 2025-08-14 RX ADMIN — ACETAMINOPHEN 975 MG: 325 TABLET ORAL at 04:41

## 2025-08-14 ASSESSMENT — ACTIVITIES OF DAILY LIVING (ADL)
ADLS_ACUITY_SCORE: 79
ADLS_ACUITY_SCORE: 75
ADLS_ACUITY_SCORE: 79
ADLS_ACUITY_SCORE: 32
ADLS_ACUITY_SCORE: 58
ADLS_ACUITY_SCORE: 79
ADLS_ACUITY_SCORE: 32
ADLS_ACUITY_SCORE: 79
ADLS_ACUITY_SCORE: 75
ADLS_ACUITY_SCORE: 32
ADLS_ACUITY_SCORE: 58
ADLS_ACUITY_SCORE: 81
ADLS_ACUITY_SCORE: 81
ADLS_ACUITY_SCORE: 58
ADLS_ACUITY_SCORE: 58
ADLS_ACUITY_SCORE: 79
ADLS_ACUITY_SCORE: 79
ADLS_ACUITY_SCORE: 81
ADLS_ACUITY_SCORE: 79
ADLS_ACUITY_SCORE: 81
ADLS_ACUITY_SCORE: 81

## 2025-08-15 LAB
ANION GAP SERPL CALCULATED.3IONS-SCNC: 7 MMOL/L (ref 7–15)
ATRIAL RATE - MUSE: 99 BPM
BLD PROD TYP BPU: NORMAL
BLOOD COMPONENT TYPE: NORMAL
BUN SERPL-MCNC: 14.2 MG/DL (ref 6–20)
CA-I BLD-MCNC: 4.4 MG/DL (ref 4.4–5.2)
CA-I BLD-MCNC: 4.6 MG/DL (ref 4.4–5.2)
CALCIUM SERPL-MCNC: 7.5 MG/DL (ref 8.8–10.4)
CHLORIDE SERPL-SCNC: 102 MMOL/L (ref 98–107)
CODING SYSTEM: NORMAL
CREAT SERPL-MCNC: 0.2 MG/DL (ref 0.67–1.17)
CROSSMATCH: NORMAL
DIASTOLIC BLOOD PRESSURE - MUSE: NORMAL MMHG
EGFRCR SERPLBLD CKD-EPI 2021: >90 ML/MIN/1.73M2
ERYTHROCYTE [DISTWIDTH] IN BLOOD BY AUTOMATED COUNT: 14.5 % (ref 10–15)
ERYTHROCYTE [DISTWIDTH] IN BLOOD BY AUTOMATED COUNT: 14.6 % (ref 10–15)
GLUCOSE BLDC GLUCOMTR-MCNC: 105 MG/DL (ref 70–99)
GLUCOSE BLDC GLUCOMTR-MCNC: 106 MG/DL (ref 70–99)
GLUCOSE BLDC GLUCOMTR-MCNC: 109 MG/DL (ref 70–99)
GLUCOSE BLDC GLUCOMTR-MCNC: 110 MG/DL (ref 70–99)
GLUCOSE BLDC GLUCOMTR-MCNC: 121 MG/DL (ref 70–99)
GLUCOSE BLDC GLUCOMTR-MCNC: 139 MG/DL (ref 70–99)
GLUCOSE BLDC GLUCOMTR-MCNC: 97 MG/DL (ref 70–99)
GLUCOSE SERPL-MCNC: 123 MG/DL (ref 70–99)
HCO3 SERPL-SCNC: 26 MMOL/L (ref 22–29)
HCT VFR BLD AUTO: 22.7 % (ref 40–53)
HCT VFR BLD AUTO: 28.3 % (ref 40–53)
HGB BLD-MCNC: 7.8 G/DL (ref 13.3–17.7)
HGB BLD-MCNC: 9.7 G/DL (ref 13.3–17.7)
INTERPRETATION ECG - MUSE: NORMAL
ISSUE DATE AND TIME: NORMAL
MAGNESIUM SERPL-MCNC: 1.8 MG/DL (ref 1.7–2.3)
MCH RBC QN AUTO: 29.2 PG (ref 26.5–33)
MCH RBC QN AUTO: 29.5 PG (ref 26.5–33)
MCHC RBC AUTO-ENTMCNC: 34.3 G/DL (ref 31.5–36.5)
MCHC RBC AUTO-ENTMCNC: 34.4 G/DL (ref 31.5–36.5)
MCV RBC AUTO: 85.2 FL (ref 78–100)
MCV RBC AUTO: 86 FL (ref 78–100)
P AXIS - MUSE: 50 DEGREES
PHOSPHATE SERPL-MCNC: 2.2 MG/DL (ref 2.5–4.5)
PLATELET # BLD AUTO: 110 10E3/UL (ref 150–450)
PLATELET # BLD AUTO: 99 10E3/UL (ref 150–450)
POTASSIUM SERPL-SCNC: 3.9 MMOL/L (ref 3.4–5.3)
PR INTERVAL - MUSE: 138 MS
QRS DURATION - MUSE: 90 MS
QT - MUSE: 362 MS
QTC - MUSE: 464 MS
R AXIS - MUSE: -3 DEGREES
RBC # BLD AUTO: 2.64 10E6/UL (ref 4.4–5.9)
RBC # BLD AUTO: 3.32 10E6/UL (ref 4.4–5.9)
SODIUM SERPL-SCNC: 135 MMOL/L (ref 135–145)
SYSTOLIC BLOOD PRESSURE - MUSE: NORMAL MMHG
T AXIS - MUSE: 30 DEGREES
UNIT ABO/RH: NORMAL
UNIT NUMBER: NORMAL
UNIT STATUS: NORMAL
UNIT TYPE ISBT: 5100
VENTRICULAR RATE- MUSE: 99 BPM
WBC # BLD AUTO: 12.69 10E3/UL (ref 4–11)
WBC # BLD AUTO: 13.34 10E3/UL (ref 4–11)

## 2025-08-15 PROCEDURE — P9016 RBC LEUKOCYTES REDUCED: HCPCS | Performed by: NURSE PRACTITIONER

## 2025-08-15 PROCEDURE — 999N000157 HC STATISTIC RCP TIME EA 10 MIN

## 2025-08-15 PROCEDURE — 250N000009 HC RX 250: Performed by: NURSE PRACTITIONER

## 2025-08-15 PROCEDURE — 85027 COMPLETE CBC AUTOMATED: CPT | Performed by: NURSE PRACTITIONER

## 2025-08-15 PROCEDURE — 250N000013 HC RX MED GY IP 250 OP 250 PS 637: Performed by: PHYSICIAN ASSISTANT

## 2025-08-15 PROCEDURE — 250N000009 HC RX 250: Performed by: SPECIALIST/TECHNOLOGIST

## 2025-08-15 PROCEDURE — 99232 SBSQ HOSP IP/OBS MODERATE 35: CPT | Mod: 24 | Performed by: PHYSICIAN ASSISTANT

## 2025-08-15 PROCEDURE — 250N000013 HC RX MED GY IP 250 OP 250 PS 637: Performed by: NURSE PRACTITIONER

## 2025-08-15 PROCEDURE — 250N000011 HC RX IP 250 OP 636: Performed by: NURSE PRACTITIONER

## 2025-08-15 PROCEDURE — 99233 SBSQ HOSP IP/OBS HIGH 50: CPT | Performed by: PHYSICIAN ASSISTANT

## 2025-08-15 PROCEDURE — 250N000011 HC RX IP 250 OP 636: Performed by: ORTHOPAEDIC SURGERY

## 2025-08-15 PROCEDURE — 83735 ASSAY OF MAGNESIUM: CPT | Performed by: NURSE PRACTITIONER

## 2025-08-15 PROCEDURE — 80048 BASIC METABOLIC PNL TOTAL CA: CPT | Performed by: PHYSICIAN ASSISTANT

## 2025-08-15 PROCEDURE — 36416 COLLJ CAPILLARY BLOOD SPEC: CPT | Performed by: NURSE PRACTITIONER

## 2025-08-15 PROCEDURE — 258N000003 HC RX IP 258 OP 636: Performed by: SPECIALIST/TECHNOLOGIST

## 2025-08-15 PROCEDURE — 94660 CPAP INITIATION&MGMT: CPT

## 2025-08-15 PROCEDURE — 250N000013 HC RX MED GY IP 250 OP 250 PS 637: Performed by: ORTHOPAEDIC SURGERY

## 2025-08-15 PROCEDURE — 84100 ASSAY OF PHOSPHORUS: CPT | Performed by: NURSE PRACTITIONER

## 2025-08-15 PROCEDURE — 250N000011 HC RX IP 250 OP 636: Performed by: PHYSICIAN ASSISTANT

## 2025-08-15 PROCEDURE — 82330 ASSAY OF CALCIUM: CPT | Performed by: NURSE PRACTITIONER

## 2025-08-15 PROCEDURE — 85018 HEMOGLOBIN: CPT | Performed by: PHYSICIAN ASSISTANT

## 2025-08-15 PROCEDURE — 200N000002 HC R&B ICU UMMC

## 2025-08-15 RX ORDER — METHYLPREDNISOLONE SODIUM SUCCINATE 125 MG/2ML
INJECTION INTRAMUSCULAR; INTRAVENOUS
Status: DISCONTINUED
Start: 2025-08-15 | End: 2025-08-15

## 2025-08-15 RX ORDER — BISACODYL 10 MG
10 SUPPOSITORY, RECTAL RECTAL ONCE
Status: COMPLETED | OUTPATIENT
Start: 2025-08-16 | End: 2025-08-16

## 2025-08-15 RX ORDER — MAGNESIUM OXIDE 400 MG/1
400 TABLET ORAL EVERY 4 HOURS
Status: COMPLETED | OUTPATIENT
Start: 2025-08-15 | End: 2025-08-15

## 2025-08-15 RX ORDER — GABAPENTIN 300 MG/1
300 CAPSULE ORAL 3 TIMES DAILY
Status: DISCONTINUED | OUTPATIENT
Start: 2025-08-15 | End: 2025-08-20 | Stop reason: HOSPADM

## 2025-08-15 RX ORDER — HYDROMORPHONE HCL IN WATER/PF 6 MG/30 ML
.2-.3 PATIENT CONTROLLED ANALGESIA SYRINGE INTRAVENOUS
Status: DISCONTINUED | OUTPATIENT
Start: 2025-08-15 | End: 2025-08-20 | Stop reason: HOSPADM

## 2025-08-15 RX ADMIN — ACETAMINOPHEN 975 MG: 325 TABLET ORAL at 11:22

## 2025-08-15 RX ADMIN — Medication 1000 MG: at 20:15

## 2025-08-15 RX ADMIN — CARBOXYMETHYLCELLULOSE SODIUM 1 DROP: 5 SOLUTION/ DROPS OPHTHALMIC at 08:57

## 2025-08-15 RX ADMIN — SENNOSIDES AND DOCUSATE SODIUM 2 TABLET: 50; 8.6 TABLET ORAL at 08:38

## 2025-08-15 RX ADMIN — SENNOSIDES AND DOCUSATE SODIUM 2 TABLET: 50; 8.6 TABLET ORAL at 20:15

## 2025-08-15 RX ADMIN — Medication 500 MG: at 08:38

## 2025-08-15 RX ADMIN — FAMOTIDINE 20 MG: 20 TABLET, FILM COATED ORAL at 08:38

## 2025-08-15 RX ADMIN — FLUOXETINE HYDROCHLORIDE 20 MG: 20 CAPSULE ORAL at 08:38

## 2025-08-15 RX ADMIN — FAMOTIDINE 20 MG: 20 TABLET, FILM COATED ORAL at 20:15

## 2025-08-15 RX ADMIN — MAGNESIUM OXIDE TAB 400 MG (241.3 MG ELEMENTAL MG) 400 MG: 400 (241.3 MG) TAB at 11:22

## 2025-08-15 RX ADMIN — KETAMINE HYDROCHLORIDE 5 MG/HR: 10 INJECTION INTRAMUSCULAR; INTRAVENOUS at 13:31

## 2025-08-15 RX ADMIN — GABAPENTIN 300 MG: 300 CAPSULE ORAL at 08:38

## 2025-08-15 RX ADMIN — CEFAZOLIN SODIUM 2 G: 2 SOLUTION INTRAVENOUS at 01:05

## 2025-08-15 RX ADMIN — HYDROCORTISONE SODIUM SUCCINATE 50 MG: 100 INJECTION, POWDER, FOR SOLUTION INTRAMUSCULAR; INTRAVENOUS at 06:03

## 2025-08-15 RX ADMIN — POTASSIUM & SODIUM PHOSPHATES POWDER PACK 280-160-250 MG 1 PACKET: 280-160-250 PACK at 14:35

## 2025-08-15 RX ADMIN — CEFAZOLIN SODIUM 2 G: 2 SOLUTION INTRAVENOUS at 09:05

## 2025-08-15 RX ADMIN — POLYETHYLENE GLYCOL 3350 17 G: 17 POWDER, FOR SOLUTION ORAL at 08:38

## 2025-08-15 RX ADMIN — HYDROCORTISONE SODIUM SUCCINATE 50 MG: 100 INJECTION, POWDER, FOR SOLUTION INTRAMUSCULAR; INTRAVENOUS at 11:22

## 2025-08-15 RX ADMIN — METOPROLOL TARTRATE 5 MG: 5 INJECTION, SOLUTION INTRAVENOUS at 08:38

## 2025-08-15 RX ADMIN — ACETAMINOPHEN 975 MG: 325 TABLET ORAL at 20:15

## 2025-08-15 RX ADMIN — POTASSIUM & SODIUM PHOSPHATES POWDER PACK 280-160-250 MG 1 PACKET: 280-160-250 PACK at 11:22

## 2025-08-15 RX ADMIN — HYDROCORTISONE SODIUM SUCCINATE 50 MG: 100 INJECTION, POWDER, FOR SOLUTION INTRAMUSCULAR; INTRAVENOUS at 23:48

## 2025-08-15 RX ADMIN — ACETAMINOPHEN 975 MG: 325 TABLET ORAL at 03:52

## 2025-08-15 RX ADMIN — CEFAZOLIN SODIUM 2 G: 2 SOLUTION INTRAVENOUS at 18:03

## 2025-08-15 RX ADMIN — POLYETHYLENE GLYCOL 3350 17 G: 17 POWDER, FOR SOLUTION ORAL at 20:16

## 2025-08-15 RX ADMIN — MAGNESIUM OXIDE TAB 400 MG (241.3 MG ELEMENTAL MG) 400 MG: 400 (241.3 MG) TAB at 08:38

## 2025-08-15 RX ADMIN — Medication 1000 MG: at 08:38

## 2025-08-15 RX ADMIN — HYDROCORTISONE SODIUM SUCCINATE 50 MG: 100 INJECTION, POWDER, FOR SOLUTION INTRAMUSCULAR; INTRAVENOUS at 18:03

## 2025-08-15 RX ADMIN — POTASSIUM & SODIUM PHOSPHATES POWDER PACK 280-160-250 MG 1 PACKET: 280-160-250 PACK at 08:38

## 2025-08-15 RX ADMIN — Medication 125 MCG: at 08:57

## 2025-08-15 RX ADMIN — METOPROLOL TARTRATE 5 MG: 5 INJECTION, SOLUTION INTRAVENOUS at 20:15

## 2025-08-15 RX ADMIN — METOPROLOL TARTRATE 5 MG: 5 INJECTION, SOLUTION INTRAVENOUS at 14:36

## 2025-08-15 ASSESSMENT — ACTIVITIES OF DAILY LIVING (ADL)
ADLS_ACUITY_SCORE: 81

## 2025-08-16 LAB
ANION GAP SERPL CALCULATED.3IONS-SCNC: 6 MMOL/L (ref 7–15)
BUN SERPL-MCNC: 14.8 MG/DL (ref 6–20)
CALCIUM SERPL-MCNC: 7.8 MG/DL (ref 8.8–10.4)
CHLORIDE SERPL-SCNC: 104 MMOL/L (ref 98–107)
CREAT SERPL-MCNC: 0.17 MG/DL (ref 0.67–1.17)
EGFRCR SERPLBLD CKD-EPI 2021: >90 ML/MIN/1.73M2
ERYTHROCYTE [DISTWIDTH] IN BLOOD BY AUTOMATED COUNT: 14.8 % (ref 10–15)
GLUCOSE BLDC GLUCOMTR-MCNC: 84 MG/DL (ref 70–99)
GLUCOSE SERPL-MCNC: 86 MG/DL (ref 70–99)
HCO3 SERPL-SCNC: 28 MMOL/L (ref 22–29)
HCT VFR BLD AUTO: 26.6 % (ref 40–53)
HGB BLD-MCNC: 8.9 G/DL (ref 13.3–17.7)
MAGNESIUM SERPL-MCNC: 2 MG/DL (ref 1.7–2.3)
MCH RBC QN AUTO: 29.7 PG (ref 26.5–33)
MCHC RBC AUTO-ENTMCNC: 33.5 G/DL (ref 31.5–36.5)
MCV RBC AUTO: 88.7 FL (ref 78–100)
PHOSPHATE SERPL-MCNC: 2.8 MG/DL (ref 2.5–4.5)
PLATELET # BLD AUTO: 121 10E3/UL (ref 150–450)
POTASSIUM SERPL-SCNC: 4 MMOL/L (ref 3.4–5.3)
RBC # BLD AUTO: 3 10E6/UL (ref 4.4–5.9)
SODIUM SERPL-SCNC: 138 MMOL/L (ref 135–145)
WBC # BLD AUTO: 12.6 10E3/UL (ref 4–11)

## 2025-08-16 PROCEDURE — 250N000013 HC RX MED GY IP 250 OP 250 PS 637: Performed by: ORTHOPAEDIC SURGERY

## 2025-08-16 PROCEDURE — 94640 AIRWAY INHALATION TREATMENT: CPT | Mod: 76

## 2025-08-16 PROCEDURE — 999N000157 HC STATISTIC RCP TIME EA 10 MIN

## 2025-08-16 PROCEDURE — 250N000009 HC RX 250: Performed by: PHYSICIAN ASSISTANT

## 2025-08-16 PROCEDURE — 99233 SBSQ HOSP IP/OBS HIGH 50: CPT | Performed by: INTERNAL MEDICINE

## 2025-08-16 PROCEDURE — 250N000009 HC RX 250: Performed by: NURSE PRACTITIONER

## 2025-08-16 PROCEDURE — 80048 BASIC METABOLIC PNL TOTAL CA: CPT | Performed by: PHYSICIAN ASSISTANT

## 2025-08-16 PROCEDURE — 83735 ASSAY OF MAGNESIUM: CPT | Performed by: NURSE PRACTITIONER

## 2025-08-16 PROCEDURE — 250N000012 HC RX MED GY IP 250 OP 636 PS 637

## 2025-08-16 PROCEDURE — 258N000003 HC RX IP 258 OP 636: Performed by: PHYSICIAN ASSISTANT

## 2025-08-16 PROCEDURE — 36415 COLL VENOUS BLD VENIPUNCTURE: CPT | Performed by: PHYSICIAN ASSISTANT

## 2025-08-16 PROCEDURE — 99232 SBSQ HOSP IP/OBS MODERATE 35: CPT | Mod: 24

## 2025-08-16 PROCEDURE — 250N000013 HC RX MED GY IP 250 OP 250 PS 637: Performed by: PHYSICIAN ASSISTANT

## 2025-08-16 PROCEDURE — 94660 CPAP INITIATION&MGMT: CPT

## 2025-08-16 PROCEDURE — 84100 ASSAY OF PHOSPHORUS: CPT | Performed by: NURSE PRACTITIONER

## 2025-08-16 PROCEDURE — 250N000013 HC RX MED GY IP 250 OP 250 PS 637

## 2025-08-16 PROCEDURE — 120N000002 HC R&B MED SURG/OB UMMC

## 2025-08-16 PROCEDURE — 85014 HEMATOCRIT: CPT | Performed by: PHYSICIAN ASSISTANT

## 2025-08-16 RX ORDER — CARVEDILOL 3.12 MG/1
3.12 TABLET ORAL 2 TIMES DAILY WITH MEALS
Status: DISCONTINUED | OUTPATIENT
Start: 2025-08-16 | End: 2025-08-19

## 2025-08-16 RX ORDER — MAGNESIUM OXIDE 400 MG/1
400 TABLET ORAL EVERY 4 HOURS
Status: COMPLETED | OUTPATIENT
Start: 2025-08-16 | End: 2025-08-16

## 2025-08-16 RX ADMIN — KETAMINE HYDROCHLORIDE 2.5 MG/HR: 10 INJECTION INTRAMUSCULAR; INTRAVENOUS at 17:58

## 2025-08-16 RX ADMIN — HYDROMORPHONE HYDROCHLORIDE 2 MG: 2 TABLET ORAL at 14:19

## 2025-08-16 RX ADMIN — MAGNESIUM OXIDE TAB 400 MG (241.3 MG ELEMENTAL MG) 400 MG: 400 (241.3 MG) TAB at 12:22

## 2025-08-16 RX ADMIN — SENNOSIDES AND DOCUSATE SODIUM 2 TABLET: 50; 8.6 TABLET ORAL at 07:41

## 2025-08-16 RX ADMIN — ACETAMINOPHEN 975 MG: 325 TABLET ORAL at 04:28

## 2025-08-16 RX ADMIN — Medication 125 MCG: at 07:42

## 2025-08-16 RX ADMIN — MAGNESIUM OXIDE TAB 400 MG (241.3 MG ELEMENTAL MG) 400 MG: 400 (241.3 MG) TAB at 07:42

## 2025-08-16 RX ADMIN — METHOCARBAMOL 750 MG: 750 TABLET ORAL at 07:42

## 2025-08-16 RX ADMIN — ACETAMINOPHEN 975 MG: 325 TABLET ORAL at 20:37

## 2025-08-16 RX ADMIN — Medication 1000 MG: at 07:42

## 2025-08-16 RX ADMIN — HYDROMORPHONE HYDROCHLORIDE 4 MG: 2 TABLET ORAL at 07:42

## 2025-08-16 RX ADMIN — ACETAMINOPHEN 975 MG: 325 TABLET ORAL at 12:22

## 2025-08-16 RX ADMIN — POLYETHYLENE GLYCOL 3350 17 G: 17 POWDER, FOR SOLUTION ORAL at 07:41

## 2025-08-16 RX ADMIN — METOPROLOL TARTRATE 5 MG: 5 INJECTION, SOLUTION INTRAVENOUS at 02:09

## 2025-08-16 RX ADMIN — Medication 1000 MG: at 20:37

## 2025-08-16 RX ADMIN — FAMOTIDINE 20 MG: 20 TABLET, FILM COATED ORAL at 07:42

## 2025-08-16 RX ADMIN — FLUOXETINE HYDROCHLORIDE 20 MG: 20 CAPSULE ORAL at 07:42

## 2025-08-16 RX ADMIN — METOPROLOL TARTRATE 5 MG: 5 INJECTION, SOLUTION INTRAVENOUS at 07:45

## 2025-08-16 RX ADMIN — PREDNISONE 100 MG: 50 TABLET ORAL at 10:28

## 2025-08-16 RX ADMIN — CARVEDILOL 3.12 MG: 3.12 TABLET, FILM COATED ORAL at 18:10

## 2025-08-16 RX ADMIN — Medication 500 MG: at 07:42

## 2025-08-16 RX ADMIN — BISACODYL 10 MG: 10 SUPPOSITORY RECTAL at 11:08

## 2025-08-16 RX ADMIN — SENNOSIDES AND DOCUSATE SODIUM 2 TABLET: 50; 8.6 TABLET ORAL at 20:37

## 2025-08-16 RX ADMIN — FAMOTIDINE 20 MG: 20 TABLET, FILM COATED ORAL at 20:37

## 2025-08-16 RX ADMIN — METHOCARBAMOL 750 MG: 750 TABLET ORAL at 14:19

## 2025-08-16 ASSESSMENT — ACTIVITIES OF DAILY LIVING (ADL)
ADLS_ACUITY_SCORE: 81
ADLS_ACUITY_SCORE: 77
ADLS_ACUITY_SCORE: 81
ADLS_ACUITY_SCORE: 77
ADLS_ACUITY_SCORE: 81
ADLS_ACUITY_SCORE: 77
ADLS_ACUITY_SCORE: 81
ADLS_ACUITY_SCORE: 81
ADLS_ACUITY_SCORE: 77
ADLS_ACUITY_SCORE: 81
ADLS_ACUITY_SCORE: 77
ADLS_ACUITY_SCORE: 81
ADLS_ACUITY_SCORE: 77
ADLS_ACUITY_SCORE: 81

## 2025-08-17 LAB
ANION GAP SERPL CALCULATED.3IONS-SCNC: 8 MMOL/L (ref 7–15)
BUN SERPL-MCNC: 12.6 MG/DL (ref 6–20)
CALCIUM SERPL-MCNC: 7.6 MG/DL (ref 8.8–10.4)
CHLORIDE SERPL-SCNC: 103 MMOL/L (ref 98–107)
CREAT SERPL-MCNC: 0.15 MG/DL (ref 0.67–1.17)
EGFRCR SERPLBLD CKD-EPI 2021: >90 ML/MIN/1.73M2
ERYTHROCYTE [DISTWIDTH] IN BLOOD BY AUTOMATED COUNT: 14.7 % (ref 10–15)
GLUCOSE SERPL-MCNC: 130 MG/DL (ref 70–99)
HCO3 SERPL-SCNC: 26 MMOL/L (ref 22–29)
HCT VFR BLD AUTO: 28.8 % (ref 40–53)
HGB BLD-MCNC: 9.5 G/DL (ref 13.3–17.7)
MAGNESIUM SERPL-MCNC: 2.1 MG/DL (ref 1.7–2.3)
MCH RBC QN AUTO: 29 PG (ref 26.5–33)
MCHC RBC AUTO-ENTMCNC: 33 G/DL (ref 31.5–36.5)
MCV RBC AUTO: 87.8 FL (ref 78–100)
PHOSPHATE SERPL-MCNC: 3.7 MG/DL (ref 2.5–4.5)
PLATELET # BLD AUTO: 158 10E3/UL (ref 150–450)
POTASSIUM SERPL-SCNC: 4.1 MMOL/L (ref 3.4–5.3)
RBC # BLD AUTO: 3.28 10E6/UL (ref 4.4–5.9)
SODIUM SERPL-SCNC: 137 MMOL/L (ref 135–145)
WBC # BLD AUTO: 12.38 10E3/UL (ref 4–11)

## 2025-08-17 PROCEDURE — 250N000013 HC RX MED GY IP 250 OP 250 PS 637

## 2025-08-17 PROCEDURE — 80048 BASIC METABOLIC PNL TOTAL CA: CPT | Performed by: PHYSICIAN ASSISTANT

## 2025-08-17 PROCEDURE — 250N000012 HC RX MED GY IP 250 OP 636 PS 637

## 2025-08-17 PROCEDURE — 84100 ASSAY OF PHOSPHORUS: CPT | Performed by: PHYSICIAN ASSISTANT

## 2025-08-17 PROCEDURE — 94660 CPAP INITIATION&MGMT: CPT

## 2025-08-17 PROCEDURE — 85027 COMPLETE CBC AUTOMATED: CPT | Performed by: PHYSICIAN ASSISTANT

## 2025-08-17 PROCEDURE — 99232 SBSQ HOSP IP/OBS MODERATE 35: CPT | Performed by: INTERNAL MEDICINE

## 2025-08-17 PROCEDURE — 83735 ASSAY OF MAGNESIUM: CPT | Performed by: PHYSICIAN ASSISTANT

## 2025-08-17 PROCEDURE — 250N000013 HC RX MED GY IP 250 OP 250 PS 637: Performed by: PHYSICIAN ASSISTANT

## 2025-08-17 PROCEDURE — 999N000157 HC STATISTIC RCP TIME EA 10 MIN

## 2025-08-17 PROCEDURE — 250N000013 HC RX MED GY IP 250 OP 250 PS 637: Performed by: ORTHOPAEDIC SURGERY

## 2025-08-17 PROCEDURE — 36415 COLL VENOUS BLD VENIPUNCTURE: CPT | Performed by: PHYSICIAN ASSISTANT

## 2025-08-17 PROCEDURE — 120N000002 HC R&B MED SURG/OB UMMC

## 2025-08-17 RX ADMIN — Medication 1000 MG: at 08:56

## 2025-08-17 RX ADMIN — PREDNISONE 100 MG: 50 TABLET ORAL at 11:48

## 2025-08-17 RX ADMIN — METHOCARBAMOL 750 MG: 750 TABLET ORAL at 08:57

## 2025-08-17 RX ADMIN — CARVEDILOL 3.12 MG: 3.12 TABLET, FILM COATED ORAL at 18:31

## 2025-08-17 RX ADMIN — FAMOTIDINE 20 MG: 20 TABLET, FILM COATED ORAL at 08:57

## 2025-08-17 RX ADMIN — Medication 500 MG: at 08:57

## 2025-08-17 RX ADMIN — Medication 1000 MG: at 19:32

## 2025-08-17 RX ADMIN — METHOCARBAMOL 750 MG: 750 TABLET ORAL at 15:55

## 2025-08-17 RX ADMIN — ACETAMINOPHEN 975 MG: 325 TABLET ORAL at 04:04

## 2025-08-17 RX ADMIN — FAMOTIDINE 20 MG: 20 TABLET, FILM COATED ORAL at 19:32

## 2025-08-17 RX ADMIN — ACETAMINOPHEN 975 MG: 325 TABLET ORAL at 19:32

## 2025-08-17 RX ADMIN — Medication 125 MCG: at 08:56

## 2025-08-17 RX ADMIN — SENNOSIDES AND DOCUSATE SODIUM 2 TABLET: 50; 8.6 TABLET ORAL at 08:57

## 2025-08-17 RX ADMIN — SENNOSIDES AND DOCUSATE SODIUM 2 TABLET: 50; 8.6 TABLET ORAL at 19:32

## 2025-08-17 RX ADMIN — FLUOXETINE HYDROCHLORIDE 20 MG: 20 CAPSULE ORAL at 08:56

## 2025-08-17 RX ADMIN — CARVEDILOL 3.12 MG: 3.12 TABLET, FILM COATED ORAL at 08:56

## 2025-08-17 RX ADMIN — HYDROMORPHONE HYDROCHLORIDE 2 MG: 2 TABLET ORAL at 08:56

## 2025-08-17 RX ADMIN — HYDROMORPHONE HYDROCHLORIDE 2 MG: 2 TABLET ORAL at 15:54

## 2025-08-17 RX ADMIN — ACETAMINOPHEN 975 MG: 325 TABLET ORAL at 11:48

## 2025-08-17 ASSESSMENT — ACTIVITIES OF DAILY LIVING (ADL)
ADLS_ACUITY_SCORE: 81

## 2025-08-18 LAB
MAGNESIUM SERPL-MCNC: 2 MG/DL (ref 1.7–2.3)
PHOSPHATE SERPL-MCNC: 3 MG/DL (ref 2.5–4.5)
POTASSIUM SERPL-SCNC: 4.2 MMOL/L (ref 3.4–5.3)

## 2025-08-18 PROCEDURE — 84100 ASSAY OF PHOSPHORUS: CPT | Performed by: INTERNAL MEDICINE

## 2025-08-18 PROCEDURE — 36415 COLL VENOUS BLD VENIPUNCTURE: CPT | Performed by: INTERNAL MEDICINE

## 2025-08-18 PROCEDURE — 250N000013 HC RX MED GY IP 250 OP 250 PS 637

## 2025-08-18 PROCEDURE — 99232 SBSQ HOSP IP/OBS MODERATE 35: CPT | Performed by: INTERNAL MEDICINE

## 2025-08-18 PROCEDURE — 120N000002 HC R&B MED SURG/OB UMMC

## 2025-08-18 PROCEDURE — 999N000157 HC STATISTIC RCP TIME EA 10 MIN

## 2025-08-18 PROCEDURE — 94660 CPAP INITIATION&MGMT: CPT

## 2025-08-18 PROCEDURE — 83735 ASSAY OF MAGNESIUM: CPT | Performed by: INTERNAL MEDICINE

## 2025-08-18 PROCEDURE — 250N000013 HC RX MED GY IP 250 OP 250 PS 637: Performed by: ORTHOPAEDIC SURGERY

## 2025-08-18 PROCEDURE — 250N000013 HC RX MED GY IP 250 OP 250 PS 637: Performed by: PHYSICIAN ASSISTANT

## 2025-08-18 PROCEDURE — 84132 ASSAY OF SERUM POTASSIUM: CPT | Performed by: INTERNAL MEDICINE

## 2025-08-18 RX ORDER — HYDROXYZINE HYDROCHLORIDE 25 MG/1
25 TABLET, FILM COATED ORAL EVERY 6 HOURS PRN
Qty: 24 TABLET | Refills: 0 | Status: SHIPPED | OUTPATIENT
Start: 2025-08-18

## 2025-08-18 RX ORDER — POLYETHYLENE GLYCOL 3350 17 G/17G
17 POWDER, FOR SOLUTION ORAL DAILY
Qty: 510 G | Refills: 0 | Status: SHIPPED | OUTPATIENT
Start: 2025-08-18

## 2025-08-18 RX ORDER — HYDROMORPHONE HYDROCHLORIDE 2 MG/1
2-4 TABLET ORAL EVERY 4 HOURS PRN
Qty: 28 TABLET | Refills: 0 | Status: SHIPPED | OUTPATIENT
Start: 2025-08-18 | End: 2025-08-20

## 2025-08-18 RX ORDER — MAGNESIUM OXIDE 400 MG/1
400 TABLET ORAL EVERY 4 HOURS
Status: COMPLETED | OUTPATIENT
Start: 2025-08-18 | End: 2025-08-18

## 2025-08-18 RX ORDER — AMOXICILLIN 250 MG
2 CAPSULE ORAL 2 TIMES DAILY
Qty: 30 TABLET | Refills: 0 | Status: SHIPPED | OUTPATIENT
Start: 2025-08-18

## 2025-08-18 RX ORDER — METHOCARBAMOL 750 MG/1
750 TABLET, FILM COATED ORAL EVERY 6 HOURS PRN
Qty: 35 TABLET | Refills: 0 | Status: SHIPPED | OUTPATIENT
Start: 2025-08-18

## 2025-08-18 RX ADMIN — ACETAMINOPHEN 975 MG: 325 TABLET ORAL at 12:04

## 2025-08-18 RX ADMIN — POLYETHYLENE GLYCOL 3350 17 G: 17 POWDER, FOR SOLUTION ORAL at 20:41

## 2025-08-18 RX ADMIN — FLUOXETINE HYDROCHLORIDE 20 MG: 20 CAPSULE ORAL at 08:19

## 2025-08-18 RX ADMIN — FAMOTIDINE 20 MG: 20 TABLET, FILM COATED ORAL at 20:40

## 2025-08-18 RX ADMIN — SENNOSIDES AND DOCUSATE SODIUM 2 TABLET: 50; 8.6 TABLET ORAL at 20:40

## 2025-08-18 RX ADMIN — HYDROMORPHONE HYDROCHLORIDE 2 MG: 2 TABLET ORAL at 10:42

## 2025-08-18 RX ADMIN — ACETAMINOPHEN 975 MG: 325 TABLET ORAL at 04:09

## 2025-08-18 RX ADMIN — MAGNESIUM OXIDE TAB 400 MG (241.3 MG ELEMENTAL MG) 400 MG: 400 (241.3 MG) TAB at 17:37

## 2025-08-18 RX ADMIN — HYDROMORPHONE HYDROCHLORIDE 2 MG: 2 TABLET ORAL at 19:09

## 2025-08-18 RX ADMIN — Medication 1000 MG: at 20:40

## 2025-08-18 RX ADMIN — CARVEDILOL 3.12 MG: 3.12 TABLET, FILM COATED ORAL at 17:07

## 2025-08-18 RX ADMIN — FAMOTIDINE 20 MG: 20 TABLET, FILM COATED ORAL at 08:20

## 2025-08-18 RX ADMIN — Medication 1000 MG: at 08:20

## 2025-08-18 RX ADMIN — Medication 125 MCG: at 08:20

## 2025-08-18 RX ADMIN — POLYETHYLENE GLYCOL 3350 17 G: 17 POWDER, FOR SOLUTION ORAL at 08:19

## 2025-08-18 RX ADMIN — MAGNESIUM OXIDE TAB 400 MG (241.3 MG ELEMENTAL MG) 400 MG: 400 (241.3 MG) TAB at 20:40

## 2025-08-18 RX ADMIN — CARVEDILOL 3.12 MG: 3.12 TABLET, FILM COATED ORAL at 08:19

## 2025-08-18 RX ADMIN — ACETAMINOPHEN 975 MG: 325 TABLET ORAL at 19:09

## 2025-08-18 RX ADMIN — Medication 500 MG: at 08:20

## 2025-08-18 RX ADMIN — SENNOSIDES AND DOCUSATE SODIUM 2 TABLET: 50; 8.6 TABLET ORAL at 08:20

## 2025-08-18 ASSESSMENT — ACTIVITIES OF DAILY LIVING (ADL)
ADLS_ACUITY_SCORE: 81

## 2025-08-19 ENCOUNTER — APPOINTMENT (OUTPATIENT)
Dept: ULTRASOUND IMAGING | Facility: CLINIC | Age: 20
End: 2025-08-19
Attending: STUDENT IN AN ORGANIZED HEALTH CARE EDUCATION/TRAINING PROGRAM
Payer: COMMERCIAL

## 2025-08-19 ENCOUNTER — APPOINTMENT (OUTPATIENT)
Dept: GENERAL RADIOLOGY | Facility: CLINIC | Age: 20
End: 2025-08-19
Attending: NURSE PRACTITIONER
Payer: COMMERCIAL

## 2025-08-19 LAB
ALBUMIN SERPL BCG-MCNC: 2.8 G/DL (ref 3.5–5.2)
ALP SERPL-CCNC: 118 U/L (ref 40–150)
ALT SERPL W P-5'-P-CCNC: 36 U/L (ref 0–70)
ANION GAP SERPL CALCULATED.3IONS-SCNC: 12 MMOL/L (ref 7–15)
AST SERPL W P-5'-P-CCNC: 40 U/L (ref 0–45)
BASE EXCESS BLDV CALC-SCNC: 6.5 MMOL/L (ref -3–3)
BASOPHILS # BLD MANUAL: 0 10E3/UL (ref 0–0.2)
BASOPHILS NFR BLD MANUAL: 0 %
BILIRUB SERPL-MCNC: 0.8 MG/DL
BUN SERPL-MCNC: 8.4 MG/DL (ref 6–20)
CALCIUM SERPL-MCNC: 8.1 MG/DL (ref 8.8–10.4)
CHLORIDE SERPL-SCNC: 102 MMOL/L (ref 98–107)
CREAT SERPL-MCNC: 0.21 MG/DL (ref 0.67–1.17)
EGFRCR SERPLBLD CKD-EPI 2021: >90 ML/MIN/1.73M2
EOSINOPHIL # BLD MANUAL: 0 10E3/UL (ref 0–0.7)
EOSINOPHIL NFR BLD MANUAL: 0 %
ERYTHROCYTE [DISTWIDTH] IN BLOOD BY AUTOMATED COUNT: 15 % (ref 10–15)
GLUCOSE SERPL-MCNC: 95 MG/DL (ref 70–99)
HCO3 BLDV-SCNC: 32 MMOL/L (ref 21–28)
HCO3 SERPL-SCNC: 25 MMOL/L (ref 22–29)
HCT VFR BLD AUTO: 30.7 % (ref 40–53)
HGB BLD-MCNC: 9.9 G/DL (ref 13.3–17.7)
HOLD SPECIMEN: NORMAL
HOLD SPECIMEN: NORMAL
LACTATE SERPL-SCNC: 2 MMOL/L (ref 0.7–2)
LACTATE SERPL-SCNC: 2.8 MMOL/L (ref 0.7–2)
LYMPHOCYTES # BLD MANUAL: 2.47 10E3/UL (ref 0.8–5.3)
LYMPHOCYTES NFR BLD MANUAL: 19.6 %
MAGNESIUM SERPL-MCNC: 2 MG/DL (ref 1.7–2.3)
MCH RBC QN AUTO: 29.1 PG (ref 26.5–33)
MCHC RBC AUTO-ENTMCNC: 32.2 G/DL (ref 31.5–36.5)
MCV RBC AUTO: 90.3 FL (ref 78–100)
MONOCYTES # BLD MANUAL: 0.32 10E3/UL (ref 0–1.3)
MONOCYTES NFR BLD MANUAL: 2.6 %
MRSA DNA SPEC QL NAA+PROBE: NEGATIVE
NEUTROPHILS # BLD MANUAL: 9.77 10E3/UL (ref 1.6–8.3)
NEUTROPHILS NFR BLD MANUAL: 77.8 %
O2/TOTAL GAS SETTING VFR VENT: 30 %
OXYHGB MFR BLDV: 59 % (ref 70–75)
PCO2 BLDV: 48 MM HG (ref 40–50)
PH BLDV: 7.43 [PH] (ref 7.32–7.43)
PHOSPHATE SERPL-MCNC: 3.6 MG/DL (ref 2.5–4.5)
PLAT MORPH BLD: NORMAL
PLATELET # BLD AUTO: 202 10E3/UL (ref 150–450)
PO2 BLDV: 31 MM HG (ref 25–47)
POTASSIUM SERPL-SCNC: 4.1 MMOL/L (ref 3.4–5.3)
POTASSIUM SERPL-SCNC: 4.2 MMOL/L (ref 3.4–5.3)
PROCALCITONIN SERPL IA-MCNC: 0.07 NG/ML
PROT SERPL-MCNC: 4.9 G/DL (ref 6.4–8.3)
RBC # BLD AUTO: 3.4 10E6/UL (ref 4.4–5.9)
RBC MORPH BLD: NORMAL
SA TARGET DNA: NEGATIVE
SAO2 % BLDV: 60 % (ref 70–75)
SODIUM SERPL-SCNC: 139 MMOL/L (ref 135–145)
WBC # BLD AUTO: 12.56 10E3/UL (ref 4–11)

## 2025-08-19 PROCEDURE — 82805 BLOOD GASES W/O2 SATURATION: CPT

## 2025-08-19 PROCEDURE — 82310 ASSAY OF CALCIUM: CPT

## 2025-08-19 PROCEDURE — 999N000127 HC STATISTIC PERIPHERAL IV START W US GUIDANCE

## 2025-08-19 PROCEDURE — 250N000013 HC RX MED GY IP 250 OP 250 PS 637: Performed by: STUDENT IN AN ORGANIZED HEALTH CARE EDUCATION/TRAINING PROGRAM

## 2025-08-19 PROCEDURE — 999N000065 XR THORACIC LUMBAR STANDING 2 VIEWS

## 2025-08-19 PROCEDURE — 94660 CPAP INITIATION&MGMT: CPT

## 2025-08-19 PROCEDURE — 999N000157 HC STATISTIC RCP TIME EA 10 MIN

## 2025-08-19 PROCEDURE — 93971 EXTREMITY STUDY: CPT | Mod: 26 | Performed by: RADIOLOGY

## 2025-08-19 PROCEDURE — 999N000065 XR PELVIS 1/2 VIEWS

## 2025-08-19 PROCEDURE — 84100 ASSAY OF PHOSPHORUS: CPT | Performed by: ORTHOPAEDIC SURGERY

## 2025-08-19 PROCEDURE — 85027 COMPLETE CBC AUTOMATED: CPT

## 2025-08-19 PROCEDURE — 120N000002 HC R&B MED SURG/OB UMMC

## 2025-08-19 PROCEDURE — 999N000007 HC SITE CHECK

## 2025-08-19 PROCEDURE — 83605 ASSAY OF LACTIC ACID: CPT | Performed by: STUDENT IN AN ORGANIZED HEALTH CARE EDUCATION/TRAINING PROGRAM

## 2025-08-19 PROCEDURE — 258N000003 HC RX IP 258 OP 636

## 2025-08-19 PROCEDURE — 999N000040 HC STATISTIC CONSULT NO CHARGE VASC ACCESS

## 2025-08-19 PROCEDURE — 250N000013 HC RX MED GY IP 250 OP 250 PS 637: Performed by: NURSE PRACTITIONER

## 2025-08-19 PROCEDURE — 36415 COLL VENOUS BLD VENIPUNCTURE: CPT | Performed by: STUDENT IN AN ORGANIZED HEALTH CARE EDUCATION/TRAINING PROGRAM

## 2025-08-19 PROCEDURE — 87641 MR-STAPH DNA AMP PROBE: CPT

## 2025-08-19 PROCEDURE — 250N000013 HC RX MED GY IP 250 OP 250 PS 637

## 2025-08-19 PROCEDURE — 85007 BL SMEAR W/DIFF WBC COUNT: CPT

## 2025-08-19 PROCEDURE — 83735 ASSAY OF MAGNESIUM: CPT | Performed by: ORTHOPAEDIC SURGERY

## 2025-08-19 PROCEDURE — 84132 ASSAY OF SERUM POTASSIUM: CPT | Performed by: ORTHOPAEDIC SURGERY

## 2025-08-19 PROCEDURE — 93971 EXTREMITY STUDY: CPT | Mod: LT

## 2025-08-19 PROCEDURE — 99233 SBSQ HOSP IP/OBS HIGH 50: CPT | Performed by: STUDENT IN AN ORGANIZED HEALTH CARE EDUCATION/TRAINING PROGRAM

## 2025-08-19 PROCEDURE — 999N000285 HC STATISTIC VASC ACCESS LAB DRAW WITH PIV START

## 2025-08-19 PROCEDURE — 250N000013 HC RX MED GY IP 250 OP 250 PS 637: Performed by: ORTHOPAEDIC SURGERY

## 2025-08-19 PROCEDURE — 84145 PROCALCITONIN (PCT): CPT

## 2025-08-19 PROCEDURE — 36415 COLL VENOUS BLD VENIPUNCTURE: CPT

## 2025-08-19 PROCEDURE — 87040 BLOOD CULTURE FOR BACTERIA: CPT

## 2025-08-19 PROCEDURE — 250N000013 HC RX MED GY IP 250 OP 250 PS 637: Performed by: PHYSICIAN ASSISTANT

## 2025-08-19 RX ORDER — LIDOCAINE 40 MG/G
CREAM TOPICAL
Status: DISCONTINUED | OUTPATIENT
Start: 2025-08-19 | End: 2025-08-20 | Stop reason: HOSPADM

## 2025-08-19 RX ORDER — HYDROMORPHONE HYDROCHLORIDE 2 MG/1
2 TABLET ORAL ONCE
Refills: 0 | Status: COMPLETED | OUTPATIENT
Start: 2025-08-19 | End: 2025-08-19

## 2025-08-19 RX ORDER — CARVEDILOL 6.25 MG/1
6.25 TABLET ORAL 2 TIMES DAILY WITH MEALS
Status: DISCONTINUED | OUTPATIENT
Start: 2025-08-19 | End: 2025-08-20 | Stop reason: HOSPADM

## 2025-08-19 RX ORDER — HYDROMORPHONE HYDROCHLORIDE 2 MG/1
2 TABLET ORAL EVERY 8 HOURS
Refills: 0 | Status: DISCONTINUED | OUTPATIENT
Start: 2025-08-19 | End: 2025-08-20 | Stop reason: HOSPADM

## 2025-08-19 RX ORDER — MAGNESIUM OXIDE 400 MG/1
400 TABLET ORAL EVERY 4 HOURS
Status: COMPLETED | OUTPATIENT
Start: 2025-08-19 | End: 2025-08-19

## 2025-08-19 RX ADMIN — Medication 1000 MG: at 07:55

## 2025-08-19 RX ADMIN — FAMOTIDINE 20 MG: 20 TABLET, FILM COATED ORAL at 07:55

## 2025-08-19 RX ADMIN — Medication 500 MG: at 07:54

## 2025-08-19 RX ADMIN — FLUOXETINE HYDROCHLORIDE 20 MG: 20 CAPSULE ORAL at 07:55

## 2025-08-19 RX ADMIN — FAMOTIDINE 20 MG: 20 TABLET, FILM COATED ORAL at 19:53

## 2025-08-19 RX ADMIN — HYDROMORPHONE HYDROCHLORIDE 2 MG: 2 TABLET ORAL at 00:54

## 2025-08-19 RX ADMIN — Medication 1000 MG: at 19:53

## 2025-08-19 RX ADMIN — SENNOSIDES AND DOCUSATE SODIUM 2 TABLET: 50; 8.6 TABLET ORAL at 07:55

## 2025-08-19 RX ADMIN — CARVEDILOL 6.25 MG: 6.25 TABLET, FILM COATED ORAL at 17:44

## 2025-08-19 RX ADMIN — MAGNESIUM OXIDE TAB 400 MG (241.3 MG ELEMENTAL MG) 400 MG: 400 (241.3 MG) TAB at 11:58

## 2025-08-19 RX ADMIN — MAGNESIUM OXIDE TAB 400 MG (241.3 MG ELEMENTAL MG) 400 MG: 400 (241.3 MG) TAB at 15:56

## 2025-08-19 RX ADMIN — CARVEDILOL 3.12 MG: 3.12 TABLET, FILM COATED ORAL at 08:09

## 2025-08-19 RX ADMIN — POLYETHYLENE GLYCOL 3350 17 G: 17 POWDER, FOR SOLUTION ORAL at 19:53

## 2025-08-19 RX ADMIN — HYDROMORPHONE HYDROCHLORIDE 2 MG: 2 TABLET ORAL at 11:04

## 2025-08-19 RX ADMIN — SENNOSIDES AND DOCUSATE SODIUM 2 TABLET: 50; 8.6 TABLET ORAL at 19:53

## 2025-08-19 RX ADMIN — SODIUM CHLORIDE, SODIUM LACTATE, POTASSIUM CHLORIDE, AND CALCIUM CHLORIDE 2121 ML: .6; .31; .03; .02 INJECTION, SOLUTION INTRAVENOUS at 17:00

## 2025-08-19 RX ADMIN — ACETAMINOPHEN 975 MG: 325 TABLET ORAL at 11:04

## 2025-08-19 RX ADMIN — ACETAMINOPHEN 975 MG: 325 TABLET ORAL at 04:16

## 2025-08-19 RX ADMIN — Medication 125 MCG: at 07:55

## 2025-08-19 RX ADMIN — HYDROXYZINE HYDROCHLORIDE 25 MG: 25 TABLET ORAL at 00:54

## 2025-08-19 RX ADMIN — ACETAMINOPHEN 975 MG: 325 TABLET ORAL at 19:53

## 2025-08-19 RX ADMIN — HYDROMORPHONE HYDROCHLORIDE 2 MG: 2 TABLET ORAL at 15:56

## 2025-08-19 RX ADMIN — Medication 1 MG: at 00:54

## 2025-08-19 ASSESSMENT — ACTIVITIES OF DAILY LIVING (ADL)
ADLS_ACUITY_SCORE: 81

## 2025-08-20 VITALS
SYSTOLIC BLOOD PRESSURE: 105 MMHG | HEART RATE: 94 BPM | BODY MASS INDEX: 29.39 KG/M2 | DIASTOLIC BLOOD PRESSURE: 92 MMHG | HEIGHT: 69 IN | TEMPERATURE: 98.3 F | RESPIRATION RATE: 16 BRPM | OXYGEN SATURATION: 97 % | WEIGHT: 198.41 LBS

## 2025-08-20 LAB
HOLD SPECIMEN: NORMAL
MAGNESIUM SERPL-MCNC: 2.2 MG/DL (ref 1.7–2.3)
PHOSPHATE SERPL-MCNC: 5.1 MG/DL (ref 2.5–4.5)
POTASSIUM SERPL-SCNC: 4.2 MMOL/L (ref 3.4–5.3)

## 2025-08-20 PROCEDURE — 999N000157 HC STATISTIC RCP TIME EA 10 MIN

## 2025-08-20 PROCEDURE — 84132 ASSAY OF SERUM POTASSIUM: CPT | Performed by: ORTHOPAEDIC SURGERY

## 2025-08-20 PROCEDURE — 36415 COLL VENOUS BLD VENIPUNCTURE: CPT | Performed by: ORTHOPAEDIC SURGERY

## 2025-08-20 PROCEDURE — 94660 CPAP INITIATION&MGMT: CPT

## 2025-08-20 PROCEDURE — 83735 ASSAY OF MAGNESIUM: CPT | Performed by: ORTHOPAEDIC SURGERY

## 2025-08-20 PROCEDURE — 250N000013 HC RX MED GY IP 250 OP 250 PS 637: Performed by: PHYSICIAN ASSISTANT

## 2025-08-20 PROCEDURE — 250N000013 HC RX MED GY IP 250 OP 250 PS 637: Performed by: STUDENT IN AN ORGANIZED HEALTH CARE EDUCATION/TRAINING PROGRAM

## 2025-08-20 PROCEDURE — 84100 ASSAY OF PHOSPHORUS: CPT | Performed by: ORTHOPAEDIC SURGERY

## 2025-08-20 PROCEDURE — 99233 SBSQ HOSP IP/OBS HIGH 50: CPT | Performed by: STUDENT IN AN ORGANIZED HEALTH CARE EDUCATION/TRAINING PROGRAM

## 2025-08-20 PROCEDURE — 250N000013 HC RX MED GY IP 250 OP 250 PS 637: Performed by: ORTHOPAEDIC SURGERY

## 2025-08-20 RX ORDER — HYDROMORPHONE HYDROCHLORIDE 2 MG/1
2 TABLET ORAL EVERY 8 HOURS
Qty: 16 TABLET | Refills: 0 | Status: SHIPPED | OUTPATIENT
Start: 2025-08-20

## 2025-08-20 RX ORDER — CARVEDILOL 6.25 MG/1
6.25 TABLET ORAL 2 TIMES DAILY WITH MEALS
Qty: 60 TABLET | Refills: 0 | Status: SHIPPED | OUTPATIENT
Start: 2025-08-20

## 2025-08-20 RX ORDER — HYDROMORPHONE HYDROCHLORIDE 2 MG/1
1 TABLET ORAL EVERY 4 HOURS PRN
Qty: 15 TABLET | Refills: 0 | Status: SHIPPED | OUTPATIENT
Start: 2025-08-20

## 2025-08-20 RX ADMIN — CARVEDILOL 6.25 MG: 6.25 TABLET, FILM COATED ORAL at 08:59

## 2025-08-20 RX ADMIN — HYDROMORPHONE HYDROCHLORIDE 2 MG: 2 TABLET ORAL at 14:49

## 2025-08-20 RX ADMIN — Medication 1000 MG: at 08:55

## 2025-08-20 RX ADMIN — HYDROMORPHONE HYDROCHLORIDE 2 MG: 2 TABLET ORAL at 00:11

## 2025-08-20 RX ADMIN — FLUOXETINE HYDROCHLORIDE 20 MG: 20 CAPSULE ORAL at 08:55

## 2025-08-20 RX ADMIN — ACETAMINOPHEN 975 MG: 325 TABLET ORAL at 04:32

## 2025-08-20 RX ADMIN — POLYETHYLENE GLYCOL 3350 17 G: 17 POWDER, FOR SOLUTION ORAL at 08:55

## 2025-08-20 RX ADMIN — HYDROMORPHONE HYDROCHLORIDE 2 MG: 2 TABLET ORAL at 08:55

## 2025-08-20 RX ADMIN — FAMOTIDINE 20 MG: 20 TABLET, FILM COATED ORAL at 08:55

## 2025-08-20 RX ADMIN — Medication 125 MCG: at 08:56

## 2025-08-20 RX ADMIN — Medication 500 MG: at 08:59

## 2025-08-20 RX ADMIN — SENNOSIDES AND DOCUSATE SODIUM 2 TABLET: 50; 8.6 TABLET ORAL at 08:55

## 2025-08-20 RX ADMIN — METHOCARBAMOL 750 MG: 750 TABLET ORAL at 08:55

## 2025-08-20 ASSESSMENT — ACTIVITIES OF DAILY LIVING (ADL)
ADLS_ACUITY_SCORE: 81

## 2025-08-21 DIAGNOSIS — I43 CARDIOMYOPATHY IN DUCHENNE MUSCULAR DYSTROPHY (H): ICD-10-CM

## 2025-08-21 DIAGNOSIS — G71.01 CARDIOMYOPATHY IN DUCHENNE MUSCULAR DYSTROPHY (H): ICD-10-CM

## 2025-08-21 LAB
BACTERIA SPEC CULT: NORMAL
BACTERIA SPEC CULT: NORMAL

## 2025-08-21 RX ORDER — ENALAPRIL MALEATE 10 MG/1
15 TABLET ORAL 2 TIMES DAILY
Qty: 90 TABLET | Refills: 5 | Status: CANCELLED | OUTPATIENT
Start: 2025-08-21

## 2025-08-24 LAB
BACTERIA SPEC CULT: NO GROWTH
BACTERIA SPEC CULT: NO GROWTH

## (undated) DEVICE — ESU BIPOLAR SEALER AQUAMANTYS 6MM 23-112-1

## (undated) DEVICE — Device

## (undated) DEVICE — TUBING SUCTION MEDI-VAC SOFT 3/16"X20' N520A

## (undated) DEVICE — SUCTION MANIFOLD NEPTUNE 2 SYS 4 PORT 0702-020-000

## (undated) DEVICE — LINEN TOWEL PACK X30 5481

## (undated) DEVICE — NDL COUNTER 40CT  31142311

## (undated) DEVICE — TOOL DISSECT MIDAS MR8 14CM MATCH HEAD 3MM MR8-14MH30

## (undated) DEVICE — RX SURGIFLO HEMOSTATIC MATRIX W/THROMBIN 8ML 2994

## (undated) DEVICE — SU VICRYL 2-0 CT-2 CR 8X18" J726D

## (undated) DEVICE — CELL SAVER

## (undated) DEVICE — DRSG GAUZE 2X2" 8042

## (undated) DEVICE — SU VICRYL 2-0 CT-1 27" UND J259H

## (undated) DEVICE — ESU GROUND PAD ADULT W/CORD E7507

## (undated) DEVICE — DRSG TEGADERM 4X4 3/4" 1626W

## (undated) DEVICE — DRAPE O ARM TUBE 9732722

## (undated) DEVICE — DRAPE MAYO STAND 23X54 8337

## (undated) DEVICE — SUCTION MINISQUAIR SMOKE EVAC CAPTURE DEVICE SQ20012-01

## (undated) DEVICE — SPONGE LAP 18X18" X8435

## (undated) DEVICE — CLEANSER JET LAVAGE IRR 0.05% CHG ISEPT-450-USA

## (undated) DEVICE — GLOVE PROTEXIS BLUE W/NEU-THERA 8.5  2D73EB85

## (undated) DEVICE — SUTURE MONOCRYL 3-0 18 PS2 UND MCP497G

## (undated) DEVICE — BLADE BONE MILL STRK MED 5420-MED-000

## (undated) DEVICE — SU VICRYL 1 CT-1 36" UND J947H

## (undated) DEVICE — SU DERMABOND PRINEO 42CM CLR422US

## (undated) DEVICE — SU VICRYL 0 CT-1 27" J340H

## (undated) DEVICE — GLOVE PROTEXIS MICRO 8.0 LT BLUE 2D73PM80

## (undated) DEVICE — KIT BONE MILL PREP & CARTRIDGE 5420-PRP-000

## (undated) DEVICE — SOLUTION IRRIGATION 0.9% NACL 1000ML BOTTLE R5200-01

## (undated) DEVICE — PACK SPINE INSTRUMENT DRAPE 76091-ACM7820

## (undated) DEVICE — SPECIMEN CONTAINER 5OZ STERILE 2600SA

## (undated) DEVICE — PREP CHLORAPREP 26ML TINTED HI-LITE ORANGE 930815

## (undated) DEVICE — STPL SKIN 35W ROTATING HEAD PRW35

## (undated) DEVICE — SUCTION TIP YANKAUER STR K87

## (undated) DEVICE — SPONGE COTTONOID 1X3" 80-1408

## (undated) DEVICE — SUTURE VICRYL+ 1 CT-1 CR 8X18" VIO VCP741D

## (undated) DEVICE — SOLUTION WATER 1000ML BOTTLE R5000-01

## (undated) DEVICE — SYR 03ML LL W/O NDL 309657

## (undated) DEVICE — IOM SUPPLIES/CASE FEE

## (undated) DEVICE — DRSG AQUACEL AG 3.5X14"  422607

## (undated) DEVICE — LINEN BACK PACK 5440

## (undated) DEVICE — IMP SCR MEDT 5.5/6.0MM SOLERA 5.0X40MM MA 55840005040: Type: IMPLANTABLE DEVICE | Site: SPINE THORACIC | Status: NON-FUNCTIONAL

## (undated) DEVICE — SOL ISOPROPYL RUBBING ALCOHOL USP 70% 4OZ HDX-20 I0020

## (undated) DEVICE — POSITIONER ARMBOARD FOAM CONVOLUTE CP-501

## (undated) DEVICE — MARKER SPHERES PASSIVE MEDT PACK 5 8801075

## (undated) RX ORDER — FENTANYL CITRATE-0.9 % NACL/PF 10 MCG/ML
PLASTIC BAG, INJECTION (ML) INTRAVENOUS
Status: DISPENSED
Start: 2025-08-13

## (undated) RX ORDER — VANCOMYCIN HYDROCHLORIDE 1 G/20ML
INJECTION, POWDER, LYOPHILIZED, FOR SOLUTION INTRAVENOUS
Status: DISPENSED
Start: 2025-08-13

## (undated) RX ORDER — SODIUM CHLORIDE 9 MG/ML
INJECTION, SOLUTION INTRAVENOUS
Status: DISPENSED
Start: 2025-08-13

## (undated) RX ORDER — VANCOMYCIN HYDROCHLORIDE 500 MG/10ML
INJECTION, POWDER, LYOPHILIZED, FOR SOLUTION INTRAVENOUS
Status: DISPENSED
Start: 2025-08-13

## (undated) RX ORDER — HYDROMORPHONE HYDROCHLORIDE 1 MG/ML
INJECTION, SOLUTION INTRAMUSCULAR; INTRAVENOUS; SUBCUTANEOUS
Status: DISPENSED
Start: 2025-08-13

## (undated) RX ORDER — FENTANYL CITRATE 50 UG/ML
INJECTION, SOLUTION INTRAMUSCULAR; INTRAVENOUS
Status: DISPENSED
Start: 2025-08-13

## (undated) RX ORDER — PROPOFOL 10 MG/ML
INJECTION, EMULSION INTRAVENOUS
Status: DISPENSED
Start: 2025-08-13

## (undated) RX ORDER — EPHEDRINE SULFATE 50 MG/ML
INJECTION, SOLUTION INTRAMUSCULAR; INTRAVENOUS; SUBCUTANEOUS
Status: DISPENSED
Start: 2025-08-13

## (undated) RX ORDER — ONDANSETRON 2 MG/ML
INJECTION INTRAMUSCULAR; INTRAVENOUS
Status: DISPENSED
Start: 2025-08-13

## (undated) RX ORDER — TOBRAMYCIN 1.2 G/30ML
INJECTION, POWDER, LYOPHILIZED, FOR SOLUTION INTRAVENOUS
Status: DISPENSED
Start: 2025-08-13

## (undated) RX ORDER — CEFAZOLIN SODIUM/WATER 2 G/20 ML
SYRINGE (ML) INTRAVENOUS
Status: DISPENSED
Start: 2025-08-13

## (undated) RX ORDER — CEFAZOLIN SODIUM 1 G/3ML
INJECTION, POWDER, FOR SOLUTION INTRAMUSCULAR; INTRAVENOUS
Status: DISPENSED
Start: 2025-08-13

## (undated) RX ORDER — GABAPENTIN 300 MG/1
CAPSULE ORAL
Status: DISPENSED
Start: 2025-08-13

## (undated) RX ORDER — ACETAMINOPHEN 325 MG/1
TABLET ORAL
Status: DISPENSED
Start: 2025-08-13